# Patient Record
Sex: FEMALE | Race: WHITE | Employment: UNEMPLOYED | URBAN - NONMETROPOLITAN AREA
[De-identification: names, ages, dates, MRNs, and addresses within clinical notes are randomized per-mention and may not be internally consistent; named-entity substitution may affect disease eponyms.]

---

## 2019-10-30 ENCOUNTER — OFFICE VISIT (OUTPATIENT)
Dept: PRIMARY CARE CLINIC | Age: 64
End: 2019-10-30
Payer: COMMERCIAL

## 2019-10-30 VITALS
HEART RATE: 101 BPM | OXYGEN SATURATION: 97 % | HEIGHT: 64 IN | RESPIRATION RATE: 16 BRPM | DIASTOLIC BLOOD PRESSURE: 70 MMHG | BODY MASS INDEX: 28.34 KG/M2 | WEIGHT: 166 LBS | SYSTOLIC BLOOD PRESSURE: 128 MMHG

## 2019-10-30 DIAGNOSIS — Z12.39 ENCOUNTER FOR SCREENING FOR MALIGNANT NEOPLASM OF BREAST: ICD-10-CM

## 2019-10-30 DIAGNOSIS — R21 RASH: Primary | ICD-10-CM

## 2019-10-30 DIAGNOSIS — I10 ESSENTIAL HYPERTENSION: ICD-10-CM

## 2019-10-30 DIAGNOSIS — Z12.12 ENCOUNTER FOR SCREENING FOR MALIGNANT NEOPLASM OF RECTUM: ICD-10-CM

## 2019-10-30 PROBLEM — F32.A DEPRESSION: Status: ACTIVE | Noted: 2019-10-30

## 2019-10-30 PROBLEM — M79.7 FIBROMYALGIA: Status: ACTIVE | Noted: 2019-10-30

## 2019-10-30 PROCEDURE — 99214 OFFICE O/P EST MOD 30 MIN: CPT | Performed by: FAMILY MEDICINE

## 2019-10-30 PROCEDURE — G8419 CALC BMI OUT NRM PARAM NOF/U: HCPCS | Performed by: FAMILY MEDICINE

## 2019-10-30 PROCEDURE — G8427 DOCREV CUR MEDS BY ELIG CLIN: HCPCS | Performed by: FAMILY MEDICINE

## 2019-10-30 PROCEDURE — 3017F COLORECTAL CA SCREEN DOC REV: CPT | Performed by: FAMILY MEDICINE

## 2019-10-30 PROCEDURE — 96372 THER/PROPH/DIAG INJ SC/IM: CPT | Performed by: FAMILY MEDICINE

## 2019-10-30 PROCEDURE — 1036F TOBACCO NON-USER: CPT | Performed by: FAMILY MEDICINE

## 2019-10-30 PROCEDURE — G8484 FLU IMMUNIZE NO ADMIN: HCPCS | Performed by: FAMILY MEDICINE

## 2019-10-30 RX ORDER — TRIAMCINOLONE ACETONIDE 40 MG/ML
40 INJECTION, SUSPENSION INTRA-ARTICULAR; INTRAMUSCULAR ONCE
Status: COMPLETED | OUTPATIENT
Start: 2019-10-30 | End: 2019-10-30

## 2019-10-30 RX ORDER — OXCARBAZEPINE 600 MG/1
TABLET, FILM COATED ORAL
Refills: 1 | COMMUNITY
Start: 2019-08-15 | End: 2022-01-14 | Stop reason: SDUPTHER

## 2019-10-30 RX ORDER — AMITRIPTYLINE HYDROCHLORIDE 100 MG/1
TABLET, FILM COATED ORAL
Refills: 1 | COMMUNITY
Start: 2019-10-12 | End: 2022-01-14 | Stop reason: CLARIF

## 2019-10-30 RX ADMIN — TRIAMCINOLONE ACETONIDE 40 MG: 40 INJECTION, SUSPENSION INTRA-ARTICULAR; INTRAMUSCULAR at 13:33

## 2019-10-31 ENCOUNTER — HOSPITAL ENCOUNTER (OUTPATIENT)
Age: 64
Discharge: HOME | End: 2019-11-02
Payer: COMMERCIAL

## 2019-10-31 ENCOUNTER — TELEPHONE (OUTPATIENT)
Dept: PRIMARY CARE CLINIC | Age: 64
End: 2019-10-31

## 2019-10-31 DIAGNOSIS — I10 ESSENTIAL HYPERTENSION: ICD-10-CM

## 2019-10-31 LAB
ALBUMIN SERPL-MCNC: 4.5 G/DL (ref 3.5–5.2)
ALP BLD-CCNC: 121 U/L (ref 35–104)
ALT SERPL-CCNC: 12 U/L (ref 0–32)
ANION GAP SERPL CALCULATED.3IONS-SCNC: 12 MMOL/L (ref 7–16)
AST SERPL-CCNC: 16 U/L (ref 0–31)
BILIRUB SERPL-MCNC: 0.2 MG/DL (ref 0–1.2)
BUN BLDV-MCNC: 9 MG/DL (ref 8–23)
CALCIUM SERPL-MCNC: 9.4 MG/DL (ref 8.6–10.2)
CHLORIDE BLD-SCNC: 93 MMOL/L (ref 98–107)
CHOLESTEROL, TOTAL: 207 MG/DL (ref 0–199)
CO2: 24 MMOL/L (ref 22–29)
CREAT SERPL-MCNC: 0.6 MG/DL (ref 0.5–1)
GFR AFRICAN AMERICAN: >60
GFR NON-AFRICAN AMERICAN: >60 ML/MIN/1.73
GLUCOSE BLD-MCNC: 88 MG/DL (ref 74–99)
HDLC SERPL-MCNC: 60 MG/DL
LDL CHOLESTEROL CALCULATED: 132 MG/DL (ref 0–99)
POTASSIUM SERPL-SCNC: 4.4 MMOL/L (ref 3.5–5)
SODIUM BLD-SCNC: 129 MMOL/L (ref 132–146)
TOTAL PROTEIN: 7.8 G/DL (ref 6.4–8.3)
TRIGL SERPL-MCNC: 74 MG/DL (ref 0–149)
VLDLC SERPL CALC-MCNC: 15 MG/DL

## 2019-10-31 PROCEDURE — 80053 COMPREHEN METABOLIC PANEL: CPT

## 2019-10-31 PROCEDURE — 80061 LIPID PANEL: CPT

## 2019-10-31 PROCEDURE — 36415 COLL VENOUS BLD VENIPUNCTURE: CPT

## 2019-10-31 RX ORDER — TRIAMCINOLONE ACETONIDE 1 MG/G
CREAM TOPICAL
Qty: 1 TUBE | Refills: 2 | Status: SHIPPED
Start: 2019-10-31 | End: 2020-05-29

## 2019-11-05 DIAGNOSIS — Z12.12 ENCOUNTER FOR SCREENING FOR MALIGNANT NEOPLASM OF RECTUM: ICD-10-CM

## 2019-11-05 LAB
CONTROL: NORMAL
HEMOCCULT STL QL: NORMAL

## 2019-11-05 PROCEDURE — 82274 ASSAY TEST FOR BLOOD FECAL: CPT | Performed by: FAMILY MEDICINE

## 2020-02-19 ENCOUNTER — OFFICE VISIT (OUTPATIENT)
Dept: PRIMARY CARE CLINIC | Age: 65
End: 2020-02-19
Payer: COMMERCIAL

## 2020-02-19 VITALS
TEMPERATURE: 97.1 F | HEART RATE: 150 BPM | HEIGHT: 63 IN | RESPIRATION RATE: 18 BRPM | DIASTOLIC BLOOD PRESSURE: 82 MMHG | WEIGHT: 162 LBS | BODY MASS INDEX: 28.7 KG/M2 | SYSTOLIC BLOOD PRESSURE: 150 MMHG | OXYGEN SATURATION: 95 %

## 2020-02-19 PROBLEM — I10 HYPERTENSIVE DISORDER: Status: ACTIVE | Noted: 2020-02-19

## 2020-02-19 PROCEDURE — G8484 FLU IMMUNIZE NO ADMIN: HCPCS | Performed by: FAMILY MEDICINE

## 2020-02-19 PROCEDURE — G8427 DOCREV CUR MEDS BY ELIG CLIN: HCPCS | Performed by: FAMILY MEDICINE

## 2020-02-19 PROCEDURE — 3017F COLORECTAL CA SCREEN DOC REV: CPT | Performed by: FAMILY MEDICINE

## 2020-02-19 PROCEDURE — G8419 CALC BMI OUT NRM PARAM NOF/U: HCPCS | Performed by: FAMILY MEDICINE

## 2020-02-19 PROCEDURE — 1036F TOBACCO NON-USER: CPT | Performed by: FAMILY MEDICINE

## 2020-02-19 PROCEDURE — 99214 OFFICE O/P EST MOD 30 MIN: CPT | Performed by: FAMILY MEDICINE

## 2020-02-19 RX ORDER — KETOCONAZOLE 20 MG/G
CREAM TOPICAL
Qty: 30 G | Refills: 1 | Status: SHIPPED
Start: 2020-02-19 | End: 2021-01-14 | Stop reason: ALTCHOICE

## 2020-02-19 RX ORDER — BACLOFEN 10 MG/1
10 TABLET ORAL EVERY EVENING
COMMUNITY
Start: 2020-02-02 | End: 2022-06-24 | Stop reason: SDUPTHER
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
## 2020-04-14 ENCOUNTER — TELEPHONE (OUTPATIENT)
Dept: PRIMARY CARE CLINIC | Age: 65
End: 2020-04-14

## 2020-04-21 ENCOUNTER — TELEPHONE (OUTPATIENT)
Dept: PRIMARY CARE CLINIC | Age: 65
End: 2020-04-21

## 2020-05-29 ENCOUNTER — OFFICE VISIT (OUTPATIENT)
Dept: PRIMARY CARE CLINIC | Age: 65
End: 2020-05-29
Payer: COMMERCIAL

## 2020-05-29 VITALS
TEMPERATURE: 98 F | SYSTOLIC BLOOD PRESSURE: 136 MMHG | HEART RATE: 88 BPM | WEIGHT: 162 LBS | OXYGEN SATURATION: 97 % | RESPIRATION RATE: 16 BRPM | HEIGHT: 63 IN | BODY MASS INDEX: 28.7 KG/M2 | DIASTOLIC BLOOD PRESSURE: 80 MMHG

## 2020-05-29 PROCEDURE — 99214 OFFICE O/P EST MOD 30 MIN: CPT | Performed by: FAMILY MEDICINE

## 2020-05-29 PROCEDURE — G8419 CALC BMI OUT NRM PARAM NOF/U: HCPCS | Performed by: FAMILY MEDICINE

## 2020-05-29 PROCEDURE — 3017F COLORECTAL CA SCREEN DOC REV: CPT | Performed by: FAMILY MEDICINE

## 2020-05-29 PROCEDURE — 1036F TOBACCO NON-USER: CPT | Performed by: FAMILY MEDICINE

## 2020-05-29 PROCEDURE — G8427 DOCREV CUR MEDS BY ELIG CLIN: HCPCS | Performed by: FAMILY MEDICINE

## 2020-05-29 RX ORDER — TRIAMCINOLONE ACETONIDE 5 MG/G
CREAM TOPICAL
Qty: 15 G | Refills: 1 | Status: SHIPPED | OUTPATIENT
Start: 2020-05-29 | End: 2020-06-05

## 2020-05-29 RX ORDER — MECLIZINE HYDROCHLORIDE 25 MG/1
25 TABLET ORAL 3 TIMES DAILY PRN
Qty: 15 TABLET | Refills: 0 | Status: SHIPPED | OUTPATIENT
Start: 2020-05-29 | End: 2020-06-08

## 2020-05-29 NOTE — PROGRESS NOTES
2020     [facility]    : 1955 Sex: female   Age: 60 y.o. Chief Complaint   Patient presents with    Hypertension    Tinea       HPI: This 60y.o. -year-old female  presents today with a chief complaint of a rash. Current medication list reviewed. The patient is tolerating all medications well without adverse events or known side effects. The patient does understand the risk and benefits of the prescribed medications. The patient is not up-to-date on all age-appropriate wellness issues. Patient presents today with a chronic intermittent pruritic rash. The patient has an active lesion near the right wrist.  The patient also presents today with a complaint of vertigo. ROS:   Const: Denies changes in appetite, chills, fever, night sweats and weight loss. Eyes:  Denies discharge, a recent change in visual acuity, blurred vision and double vision. ENMT: Denies discharge of the ears, hearing loss, pain of the ears. Denies nasal or sinus symptoms other than stated above. Denies mouth or throat symptoms. CV:  Denies chest pain, dyspnea on exertion, orthopnea, palpitations and PND  Resp: Denies chest pain, cough, SOB and wheezing. GI: Denies abdominal pain, constipation, diarrhea, heartburn, indigestion, nausea and vomiting. : Denies dysuria, frequency, hematuria, nocturia and urgency. Musculo: Denies arthralgias and myalgia  Skin:  Denies lesions, pruritus and rash. Neuro: Denies dizziness, lightheadedness, numbness, tingling and weakness. Psych:  Denies anxiety and depression  Endocrine: Denies anxiety and depression. Hema/Lymph: Denies hematologic symptoms  Allergy/Immuno:  Denies allergic/immunologic symptoms. Pertinent positives reviewed and noted      Current Outpatient Medications:     meclizine (ANTIVERT) 25 MG tablet, Take 1 tablet by mouth 3 times daily as needed for Dizziness, Disp: 15 tablet, Rfl: 0    triamcinolone (ARISTOCORT) 0.5 % cream, Apply twice daily. , Disp:

## 2020-06-26 RX ORDER — MECLIZINE HYDROCHLORIDE 25 MG/1
25 TABLET ORAL 3 TIMES DAILY PRN
Qty: 90 TABLET | Refills: 1 | Status: SHIPPED
Start: 2020-06-26 | End: 2021-04-29 | Stop reason: SDUPTHER

## 2020-06-26 RX ORDER — MECLIZINE HYDROCHLORIDE 25 MG/1
25 TABLET ORAL 3 TIMES DAILY PRN
COMMUNITY
End: 2020-06-26 | Stop reason: SDUPTHER

## 2020-06-26 NOTE — TELEPHONE ENCOUNTER
Last Appointment:  5/29/2020  Future Appointments   Date Time Provider Layton King   9/3/2020 11:30 AM Pavan Burkett  [facility]      Patient needs pended med refilled.     Electronically signed by Radha Vargas LPN on 8/11/4305 at 7:68 AM

## 2020-11-03 PROBLEM — F32.A DEPRESSION: Status: RESOLVED | Noted: 2019-10-30 | Resolved: 2020-11-03

## 2021-01-14 ENCOUNTER — OFFICE VISIT (OUTPATIENT)
Dept: FAMILY MEDICINE CLINIC | Age: 66
End: 2021-01-14
Payer: COMMERCIAL

## 2021-01-14 VITALS
WEIGHT: 159 LBS | OXYGEN SATURATION: 97 % | SYSTOLIC BLOOD PRESSURE: 215 MMHG | BODY MASS INDEX: 28.17 KG/M2 | DIASTOLIC BLOOD PRESSURE: 110 MMHG | HEART RATE: 103 BPM | TEMPERATURE: 97.9 F

## 2021-01-14 DIAGNOSIS — L30.9 DERMATITIS: ICD-10-CM

## 2021-01-14 DIAGNOSIS — I16.0 HYPERTENSIVE URGENCY: Primary | ICD-10-CM

## 2021-01-14 PROCEDURE — G8484 FLU IMMUNIZE NO ADMIN: HCPCS | Performed by: PHYSICIAN ASSISTANT

## 2021-01-14 PROCEDURE — 99214 OFFICE O/P EST MOD 30 MIN: CPT | Performed by: PHYSICIAN ASSISTANT

## 2021-01-14 PROCEDURE — G8427 DOCREV CUR MEDS BY ELIG CLIN: HCPCS | Performed by: PHYSICIAN ASSISTANT

## 2021-01-14 PROCEDURE — 3017F COLORECTAL CA SCREEN DOC REV: CPT | Performed by: PHYSICIAN ASSISTANT

## 2021-01-14 PROCEDURE — 1036F TOBACCO NON-USER: CPT | Performed by: PHYSICIAN ASSISTANT

## 2021-01-14 PROCEDURE — G8419 CALC BMI OUT NRM PARAM NOF/U: HCPCS | Performed by: PHYSICIAN ASSISTANT

## 2021-01-14 PROCEDURE — 93000 ELECTROCARDIOGRAM COMPLETE: CPT | Performed by: PHYSICIAN ASSISTANT

## 2021-01-14 RX ORDER — TRIAMCINOLONE ACETONIDE 5 MG/G
CREAM TOPICAL 3 TIMES DAILY
COMMUNITY
End: 2021-04-29 | Stop reason: SDUPTHER

## 2021-01-14 NOTE — PROGRESS NOTES
present. Vitals reviewed per triage. Head/Face: Normocephalic, atraumatic. Facies is symmetric. Eyes: Pupils are equal, round, and reactive to light. No gross abnormalities of fundoscopic exam.  ENMT: Tympanic membranes are pearly gray with good light reflex bilaterally. Nares are patent. Buccal mucosa was moist.   Posterior pharynx shows no exudate, irritation or redness. Neck: Supple and symmetric. Palpation reveals no adenopathy. Trachea midline. Resp: Lungs are clear bilaterally. CV: Rhythm is regular. S1 is normal. S2 is normal. No murmurs rubs or clicks. Musculo: Patient moves extremities without pain or limitation. Muscle strength is strong and equal bilaterally. Pulses are equal bilaterally. No edema. Skin: Skin is warm and dry. Patient does have a blotchy erythematous macular rash noted to her anterior chest and neck area. No vesicles pustules or signs and symptoms of secondary infection  Neuro: Alert and oriented x3. Speech is articulate and fluent. No focal neurologic deficits. Psych: Patient's mood and affect are appropriate. I did repatient repeat the patient's blood pressure was 210/106. EKG shows a sinus tach of 109    no acute ischemia is noted. I do feel based on the patient's history and physical exam further evaluation is warranted of the patient's blood pressure. Therefore patient was sent to [facility] emergency department for further evaluation and treatment. Nakul Griffiths was seen today for rash. Diagnoses and all orders for this visit:    Hypertensive urgency  -     EKG 12 Lead    Dermatitis        Return for To ER for evaluation and management.     Seen By:    LORENZO Kan

## 2021-01-26 ENCOUNTER — OFFICE VISIT (OUTPATIENT)
Dept: PRIMARY CARE CLINIC | Age: 66
End: 2021-01-26
Payer: COMMERCIAL

## 2021-01-26 VITALS
RESPIRATION RATE: 16 BRPM | HEART RATE: 93 BPM | HEIGHT: 63 IN | WEIGHT: 160 LBS | SYSTOLIC BLOOD PRESSURE: 138 MMHG | BODY MASS INDEX: 28.35 KG/M2 | OXYGEN SATURATION: 97 % | DIASTOLIC BLOOD PRESSURE: 84 MMHG

## 2021-01-26 DIAGNOSIS — K21.9 GASTROESOPHAGEAL REFLUX DISEASE, UNSPECIFIED WHETHER ESOPHAGITIS PRESENT: ICD-10-CM

## 2021-01-26 DIAGNOSIS — I10 ESSENTIAL HYPERTENSION: Primary | ICD-10-CM

## 2021-01-26 DIAGNOSIS — R21 RASH: ICD-10-CM

## 2021-01-26 PROCEDURE — 3017F COLORECTAL CA SCREEN DOC REV: CPT | Performed by: FAMILY MEDICINE

## 2021-01-26 PROCEDURE — 99214 OFFICE O/P EST MOD 30 MIN: CPT | Performed by: FAMILY MEDICINE

## 2021-01-26 PROCEDURE — 1036F TOBACCO NON-USER: CPT | Performed by: FAMILY MEDICINE

## 2021-01-26 PROCEDURE — G8419 CALC BMI OUT NRM PARAM NOF/U: HCPCS | Performed by: FAMILY MEDICINE

## 2021-01-26 PROCEDURE — G8427 DOCREV CUR MEDS BY ELIG CLIN: HCPCS | Performed by: FAMILY MEDICINE

## 2021-01-26 PROCEDURE — G8484 FLU IMMUNIZE NO ADMIN: HCPCS | Performed by: FAMILY MEDICINE

## 2021-01-26 RX ORDER — OMEPRAZOLE 20 MG/1
20 CAPSULE, DELAYED RELEASE ORAL
Qty: 30 CAPSULE | Refills: 5 | Status: SHIPPED
Start: 2021-01-26 | End: 2021-04-29 | Stop reason: SDUPTHER

## 2021-01-26 RX ORDER — AMLODIPINE BESYLATE 5 MG/1
TABLET ORAL
COMMUNITY
Start: 2021-01-15 | End: 2021-02-18 | Stop reason: SDUPTHER

## 2021-01-26 SDOH — ECONOMIC STABILITY: FOOD INSECURITY: WITHIN THE PAST 12 MONTHS, THE FOOD YOU BOUGHT JUST DIDN'T LAST AND YOU DIDN'T HAVE MONEY TO GET MORE.: NEVER TRUE

## 2021-01-26 SDOH — ECONOMIC STABILITY: TRANSPORTATION INSECURITY
IN THE PAST 12 MONTHS, HAS THE LACK OF TRANSPORTATION KEPT YOU FROM MEDICAL APPOINTMENTS OR FROM GETTING MEDICATIONS?: YES

## 2021-01-26 SDOH — ECONOMIC STABILITY: TRANSPORTATION INSECURITY
IN THE PAST 12 MONTHS, HAS LACK OF TRANSPORTATION KEPT YOU FROM MEETINGS, WORK, OR FROM GETTING THINGS NEEDED FOR DAILY LIVING?: YES

## 2021-01-26 SDOH — ECONOMIC STABILITY: FOOD INSECURITY: WITHIN THE PAST 12 MONTHS, YOU WORRIED THAT YOUR FOOD WOULD RUN OUT BEFORE YOU GOT MONEY TO BUY MORE.: NEVER TRUE
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
## 2021-02-18 RX ORDER — AMLODIPINE BESYLATE 5 MG/1
TABLET ORAL
Qty: 30 TABLET | Refills: 3 | Status: SHIPPED
Start: 2021-02-18 | End: 2021-04-29 | Stop reason: SDUPTHER

## 2021-02-18 NOTE — TELEPHONE ENCOUNTER
Pt called stated she needed a refill on Norvasc. Stated that it was prescribed by Emergency room but seems to be working.

## 2021-04-29 ENCOUNTER — OFFICE VISIT (OUTPATIENT)
Dept: PRIMARY CARE CLINIC | Age: 66
End: 2021-04-29
Payer: COMMERCIAL

## 2021-04-29 VITALS
SYSTOLIC BLOOD PRESSURE: 132 MMHG | DIASTOLIC BLOOD PRESSURE: 80 MMHG | WEIGHT: 169 LBS | BODY MASS INDEX: 28.85 KG/M2 | HEART RATE: 85 BPM | OXYGEN SATURATION: 95 % | HEIGHT: 64 IN | RESPIRATION RATE: 16 BRPM

## 2021-04-29 DIAGNOSIS — R42 VERTIGO: ICD-10-CM

## 2021-04-29 DIAGNOSIS — R21 RASH: ICD-10-CM

## 2021-04-29 DIAGNOSIS — I10 ESSENTIAL HYPERTENSION: Primary | ICD-10-CM

## 2021-04-29 DIAGNOSIS — Z12.12 ENCOUNTER FOR SCREENING FOR MALIGNANT NEOPLASM OF RECTUM: ICD-10-CM

## 2021-04-29 DIAGNOSIS — K21.9 GASTROESOPHAGEAL REFLUX DISEASE, UNSPECIFIED WHETHER ESOPHAGITIS PRESENT: ICD-10-CM

## 2021-04-29 PROCEDURE — G8419 CALC BMI OUT NRM PARAM NOF/U: HCPCS | Performed by: FAMILY MEDICINE

## 2021-04-29 PROCEDURE — 1036F TOBACCO NON-USER: CPT | Performed by: FAMILY MEDICINE

## 2021-04-29 PROCEDURE — 3017F COLORECTAL CA SCREEN DOC REV: CPT | Performed by: FAMILY MEDICINE

## 2021-04-29 PROCEDURE — 99214 OFFICE O/P EST MOD 30 MIN: CPT | Performed by: FAMILY MEDICINE

## 2021-04-29 PROCEDURE — G8427 DOCREV CUR MEDS BY ELIG CLIN: HCPCS | Performed by: FAMILY MEDICINE

## 2021-04-29 RX ORDER — MECLIZINE HYDROCHLORIDE 25 MG/1
25 TABLET ORAL 3 TIMES DAILY PRN
Qty: 90 TABLET | Refills: 1 | Status: SHIPPED | OUTPATIENT
Start: 2021-04-29 | End: 2022-01-14 | Stop reason: ALTCHOICE

## 2021-04-29 RX ORDER — TRIAMCINOLONE ACETONIDE 5 MG/G
CREAM TOPICAL 3 TIMES DAILY
Qty: 1 TUBE | Refills: 3 | Status: SHIPPED | OUTPATIENT
Start: 2021-04-29 | End: 2022-01-14 | Stop reason: ALTCHOICE

## 2021-04-29 RX ORDER — OMEPRAZOLE 20 MG/1
20 CAPSULE, DELAYED RELEASE ORAL
Qty: 30 CAPSULE | Refills: 5 | Status: SHIPPED | OUTPATIENT
Start: 2021-04-29 | End: 2021-07-30 | Stop reason: SDUPTHER

## 2021-04-29 RX ORDER — AMLODIPINE BESYLATE 5 MG/1
TABLET ORAL
Qty: 30 TABLET | Refills: 3 | Status: SHIPPED | OUTPATIENT
Start: 2021-04-29 | End: 2022-01-14 | Stop reason: ALTCHOICE

## 2021-04-29 NOTE — PROGRESS NOTES
2021     Nakia Vargas    : 1955 Sex: female   Age: 63 y.o. Chief Complaint   Patient presents with    Hypertension    Other       HPI: This 63y.o. -year-old female  presents today for evaluation and management of her  chronic medical problems. Current medication list reviewed. The patient is tolerating all medications well without adverse events or known side effects. The patient does understand the risk and benefits of the prescribed medications. The patient is not up-to-date on all age-appropriate wellness issues. The patient presents today with an annular rough appearing rash of the thigh region. The patient has been using Kenalog 0.5% cream without resolution. ROS:   Const: Denies changes in appetite, chills, fever, night sweats and weight loss. Eyes:  Denies discharge, a recent change in visual acuity, blurred vision and double vision. ENMT: Denies discharge of the ears, hearing loss, pain of the ears. Denies nasal or sinus symptoms other than stated above. Denies mouth or throat symptoms. CV:  Denies chest pain, dyspnea on exertion, orthopnea, palpitations and PND  Resp: Denies chest pain, cough, SOB and wheezing. GI: Denies abdominal pain, constipation, diarrhea, heartburn, indigestion, nausea and vomiting. : Denies dysuria, frequency, hematuria, nocturia and urgency. Musculo: Positive for rash. Skin:  Denies lesions, pruritus and rash. Neuro: Denies dizziness, lightheadedness, numbness, tingling and weakness. Psych:  Denies anxiety and depression  Endocrine: Denies polyuria, polydipsia, polyphagia, weight gain, dry skin, constipation, fatigue, cold intolerance, heat intolerance or tremors. Hema/Lymph: Denies hematologic symptoms  Allergy/Immuno:  Denies allergic/immunologic symptoms.   Pertinent positives reviewed and noted      Current Outpatient Medications:     meclizine (ANTIVERT) 25 MG tablet, Take 1 tablet by mouth 3 times daily as needed for Dizziness, Disp: Intimate partner violence     Fear of current or ex partner: Not on file     Emotionally abused: Not on file     Physically abused: Not on file     Forced sexual activity: Not on file   Other Topics Concern    Not on file   Social History Narrative    Not on file     Past Surgical History:   Procedure Laterality Date    BRAIN SURGERY      decompression trigeminal nerve    CARPAL [facility]        History reviewed. No pertinent family history. Vitals:    04/29/21 1255   BP: 132/80   Pulse: 85   Resp: 16   SpO2: 95%   Weight: 169 lb (76.7 kg)   Height: 5' 4\" (1.626 m)        Exam: Const: Appears healthy and well developed. No signs of acute distress present. Eyes: PERRL  ENMT: Tympanic membranes are intact. Nasal mucosa intact without noted erythema Septum is in the midline. Posterior pharynx shows no exudate, irritation or redness. Neck:  Supple without adenopathy. Adequate range of motion   Resp: No rales, rhonchi, wheezes appreciated over the lungs bilaterally. CV: S1, S2 within normal limits. Regular rate and rhythm noted. Without murmur, gallop or rub. Extremities:  Pulses intact. Without noted edema. Abdomen: Positive bowel sounds. Palpation reveals softness, with no distension, organomegaly or tenderness. No abdominal masses palpable. Skin: Skin is warm and dry. An erythematous annular rash is noted on bilateral thighs. Musculo: Unchanged upon examination. Neuro: Alert and oriented X3. Cranial nerves grossly intact. Psych: Mood is normal.  Affect is normal.   Vital signs reviewed. Controlled Substances Monitoring:     No flowsheet data found. Plan Per Assessment:  Lamberto Paris was seen today for hypertension and other. Diagnoses and all orders for this visit:    Essential hypertension    Vertigo  -     meclizine (ANTIVERT) 25 MG tablet;  Take 1 tablet by mouth 3 times daily as needed for Dizziness    Gastroesophageal reflux disease, unspecified whether esophagitis present    Encounter for screening for malignant neoplasm of rectum  -     POCT Fecal Immunochemical Test (FIT); Future    Rash    Other orders  -     amLODIPine (NORVASC) 5 MG tablet; TAKE 1 TABLET BY MOUTH EVERY DAY  -     omeprazole (PRILOSEC) 20 MG delayed release capsule; Take 1 capsule by mouth every morning (before breakfast)  -     triamcinolone (ARISTOCORT) 0.5 % cream; Apply topically 3 times daily Apply topically 3 times daily. Return in about 2 weeks (around 5/13/2021) for Biopsy. Annie Mcarthur MD    Note was generated with the assistance of voice recognition software. Document was reviewed however may contain grammatical errors.

## 2021-05-13 ENCOUNTER — OFFICE VISIT (OUTPATIENT)
Dept: PRIMARY CARE CLINIC | Age: 66
End: 2021-05-13
Payer: COMMERCIAL

## 2021-05-13 VITALS
RESPIRATION RATE: 16 BRPM | WEIGHT: 169 LBS | SYSTOLIC BLOOD PRESSURE: 126 MMHG | DIASTOLIC BLOOD PRESSURE: 70 MMHG | BODY MASS INDEX: 28.85 KG/M2 | HEIGHT: 64 IN

## 2021-05-13 DIAGNOSIS — R21 RASH: Primary | ICD-10-CM

## 2021-05-13 PROCEDURE — 3017F COLORECTAL CA SCREEN DOC REV: CPT | Performed by: FAMILY MEDICINE

## 2021-05-13 PROCEDURE — G8427 DOCREV CUR MEDS BY ELIG CLIN: HCPCS | Performed by: FAMILY MEDICINE

## 2021-05-13 PROCEDURE — G8419 CALC BMI OUT NRM PARAM NOF/U: HCPCS | Performed by: FAMILY MEDICINE

## 2021-05-13 PROCEDURE — 1036F TOBACCO NON-USER: CPT | Performed by: FAMILY MEDICINE

## 2021-05-13 PROCEDURE — 99212 OFFICE O/P EST SF 10 MIN: CPT | Performed by: FAMILY MEDICINE

## 2021-05-13 PROCEDURE — 11104 PUNCH BX SKIN SINGLE LESION: CPT | Performed by: FAMILY MEDICINE

## 2021-05-13 NOTE — PROGRESS NOTES
2021     Melani Sanchez    : 1955 Sex: female   Age: 63 y.o. Chief Complaint   Patient presents with    Hypertension    Skin Problem       HPI: This 63y.o. -year-old female  presents today for evaluation and management of her  chronic medical problems. Presents today for a biopsy of a longstanding rash. Current medication list reviewed. The patient is tolerating all medications well without adverse events or known side effects. The patient does understand the risk and benefits of the prescribed medications. The patient is up-to-date on all age-appropriate wellness issues. ROS:   Const: Denies changes in appetite, chills, fever, night sweats and weight loss. Eyes:  Denies discharge, a recent change in visual acuity, blurred vision and double vision. ENMT: Denies discharge of the ears, hearing loss, pain of the ears. Denies nasal or sinus symptoms other than stated above. Denies mouth or throat symptoms. CV:  Denies chest pain, dyspnea on exertion, orthopnea, palpitations and PND  Resp: Denies chest pain, cough, SOB and wheezing. GI: Denies abdominal pain, constipation, diarrhea, heartburn, indigestion, nausea and vomiting. : Denies dysuria, frequency, hematuria, nocturia and urgency. Musculo: Denies arthralgias and myalgia  Skin: Positive for rash. Neuro: Denies dizziness, lightheadedness, numbness, tingling and weakness. Psych:  Denies anxiety and depression  Endocrine: Denies polyuria, polydipsia, polyphagia, weight gain, dry skin, constipation, fatigue, cold intolerance, heat intolerance or tremors. Hema/Lymph: Denies hematologic symptoms  Allergy/Immuno:  Denies allergic/immunologic symptoms.   Pertinent positives reviewed and noted      Current Outpatient Medications:     meclizine (ANTIVERT) 25 MG tablet, Take 1 tablet by mouth 3 times daily as needed for Dizziness, Disp: 90 tablet, Rfl: 1    amLODIPine (NORVASC) 5 MG tablet, TAKE 1 TABLET BY MOUTH EVERY DAY, Disp: 30 tablet, Rfl: 3    omeprazole (PRILOSEC) 20 MG delayed release capsule, Take 1 capsule by mouth every morning (before breakfast), Disp: 30 capsule, Rfl: 5    triamcinolone (ARISTOCORT) 0.5 % cream, Apply topically 3 times daily Apply topically 3 times daily. , Disp: 1 Tube, Rfl: 3    baclofen (LIORESAL) 10 MG tablet, TK 2 TS PO QPM, Disp: , Rfl:     amitriptyline (ELAVIL) 100 MG tablet, TK 1 T PO  QHS, Disp: , Rfl: 1    OXcarbazepine (TRILEPTAL) 600 MG tablet, TK 1 T PO TID, Disp: , Rfl: 1    gabapentin (NEURONTIN) 600 MG tablet, Take 600 mg by mouth 4 times daily. , Disp: , Rfl:     Allergies   Allergen Reactions    Tylenol [Acetaminophen] Nausea And Vomiting       Past Medical History:   Diagnosis Date    Acid reflux     Depression     Self abuse    Trigeminal neuralgia 2005    diagnosed     Social History     Socioeconomic History    Marital status:      Spouse name: Not on file    Number of children: 1    Years of education: 15    Highest education level: Not on file   Occupational History    Not on file   Social Needs    Financial resource strain: Not hard at all   [employer] insecurity     Worry: Never true     Inability: Never true   [employer] needs     Medical: Yes     Non-medical: Yes   Tobacco Use    Smoking status: Never Smoker    Smokeless tobacco: Never Used   Substance and Sexual Activity    Alcohol use: No    Drug use: No    Sexual activity: Not on file   Lifestyle    Physical activity     Days per week: Not on file     Minutes per session: Not on file    Stress: Not on file   Relationships    Social connections     Talks on phone: Not on file     Gets together: Not on file     Attends Buddhism service: Not on file     Active member of club or organization: Not on file     Attends meetings of clubs or organizations: Not on file     Relationship status: Not on file    Intimate partner violence     Fear of current or ex partner: Not on file     Emotionally abused: Not on file     Physically abused: Not on file     Forced sexual activity: Not on file   Other Topics Concern    Not on file   Social History Narrative    Not on file     Past Surgical History:   Procedure Laterality Date    BRAIN SURGERY      decompression trigeminal nerve    CARPAL [facility]        History reviewed. No pertinent family history. Vitals:    05/13/21 1228   BP: 126/70   Resp: 16   Weight: 169 lb (76.7 kg)   Height: 5' 4\" (1.626 m)        Exam: Const: Appears healthy and well developed. No signs of acute distress present. Eyes: PERRL  ENMT: Tympanic membranes are intact. Nasal mucosa intact without noted erythema Septum is in the midline. Posterior pharynx shows no exudate, irritation or redness. Neck:  Supple without adenopathy. Adequate range of motion   Resp: No rales, rhonchi, wheezes appreciated over the lungs bilaterally. CV: S1, S2 within normal limits. Regular rate and rhythm noted. Without murmur, gallop or rub. Extremities:  Pulses intact. Without noted edema. Abdomen: Positive bowel sounds. Palpation reveals softness, with no distension, organomegaly or tenderness. No abdominal masses palpable. Skin: Skin is warm and dry. Annular rash noted left thigh. Musculo: Unchanged upon examination. Neuro: Alert and oriented X3. Cranial nerves grossly intact. Psych: Mood is normal.  Affect is normal.   Vital signs reviewed. Controlled Substances Monitoring:     No flowsheet data found. Plan Per Assessment:  Ryan Caban was seen today for hypertension and skin problem. Diagnoses and all orders for this visit:    Rash      Skin prepped in sterile fashion. Area infiltrated with lidocaine. 3 mm punch biopsy used for tissue removal.  Patient tolerated procedure well. Await pathology report. Return in about 3 months (around 8/13/2021) for MEDICATION CHECK, FOLLOW UP [address] [facility], [place]    Note was generated with the assistance of voice recognition software. Document was reviewed however may contain grammatical errors.

## 2021-07-30 RX ORDER — OMEPRAZOLE 20 MG/1
20 CAPSULE, DELAYED RELEASE ORAL
Qty: 30 CAPSULE | Refills: 5 | Status: SHIPPED
Start: 2021-07-30 | End: 2022-01-14 | Stop reason: SDUPTHER

## 2021-07-30 NOTE — TELEPHONE ENCOUNTER
----- Message from [facility] sent at 7/30/2021  9:08 AM EDT -----  Subject: Refill Request    QUESTIONS  Name of Medication? omeprazole (PRILOSEC) 20 MG delayed release capsule  Patient-reported dosage and instructions? 20mg  How many days do you have left? 0  [id number]  Pharmacy phone number (if available)? [phone]  ---------------------------------------------------------------------------  --------------  CALL BACK INFO  What is the best way for the office to contact you? OK to leave message on   voicemail  Preferred Call Back Phone Number?  [phone]

## 2022-01-14 ENCOUNTER — OFFICE VISIT (OUTPATIENT)
Dept: PRIMARY CARE CLINIC | Age: 67
End: 2022-01-14
Payer: COMMERCIAL

## 2022-01-14 VITALS
HEART RATE: 102 BPM | DIASTOLIC BLOOD PRESSURE: 74 MMHG | RESPIRATION RATE: 16 BRPM | OXYGEN SATURATION: 99 % | SYSTOLIC BLOOD PRESSURE: 136 MMHG | BODY MASS INDEX: 25.61 KG/M2 | WEIGHT: 150 LBS | HEIGHT: 64 IN

## 2022-01-14 DIAGNOSIS — Z23 NEED FOR IMMUNIZATION AGAINST INFLUENZA: ICD-10-CM

## 2022-01-14 DIAGNOSIS — F31.9 BIPOLAR 1 DISORDER (HCC): ICD-10-CM

## 2022-01-14 DIAGNOSIS — G50.0 ANESTHESIA DOLOROSA IN DISTRIBUTION OF TRIGEMINAL NERVE: ICD-10-CM

## 2022-01-14 DIAGNOSIS — R56.9 SEIZURE (HCC): ICD-10-CM

## 2022-01-14 DIAGNOSIS — Z51.81 MEDICATION MONITORING ENCOUNTER: ICD-10-CM

## 2022-01-14 DIAGNOSIS — K21.9 GASTROESOPHAGEAL REFLUX DISEASE, UNSPECIFIED WHETHER ESOPHAGITIS PRESENT: ICD-10-CM

## 2022-01-14 DIAGNOSIS — G50.0 TRIGEMINAL NEURALGIA: Primary | ICD-10-CM

## 2022-01-14 DIAGNOSIS — Z91.51 HISTORY OF SUICIDE ATTEMPT: ICD-10-CM

## 2022-01-14 DIAGNOSIS — R13.10 DYSPHAGIA, UNSPECIFIED TYPE: ICD-10-CM

## 2022-01-14 PROBLEM — T65.91XA INGESTION OF SUBSTANCE: Status: ACTIVE | Noted: 2022-01-14

## 2022-01-14 PROBLEM — T65.91XA INGESTION OF SUBSTANCE: Status: RESOLVED | Noted: 2022-01-14 | Resolved: 2022-01-14

## 2022-01-14 PROCEDURE — G0008 ADMIN INFLUENZA VIRUS VAC: HCPCS | Performed by: STUDENT IN AN ORGANIZED HEALTH CARE EDUCATION/TRAINING PROGRAM

## 2022-01-14 PROCEDURE — G8482 FLU IMMUNIZE ORDER/ADMIN: HCPCS | Performed by: STUDENT IN AN ORGANIZED HEALTH CARE EDUCATION/TRAINING PROGRAM

## 2022-01-14 PROCEDURE — 1036F TOBACCO NON-USER: CPT | Performed by: STUDENT IN AN ORGANIZED HEALTH CARE EDUCATION/TRAINING PROGRAM

## 2022-01-14 PROCEDURE — 90674 CCIIV4 VAC NO PRSV 0.5 ML IM: CPT | Performed by: STUDENT IN AN ORGANIZED HEALTH CARE EDUCATION/TRAINING PROGRAM

## 2022-01-14 PROCEDURE — G8419 CALC BMI OUT NRM PARAM NOF/U: HCPCS | Performed by: STUDENT IN AN ORGANIZED HEALTH CARE EDUCATION/TRAINING PROGRAM

## 2022-01-14 PROCEDURE — 3017F COLORECTAL CA SCREEN DOC REV: CPT | Performed by: STUDENT IN AN ORGANIZED HEALTH CARE EDUCATION/TRAINING PROGRAM

## 2022-01-14 PROCEDURE — 99214 OFFICE O/P EST MOD 30 MIN: CPT | Performed by: STUDENT IN AN ORGANIZED HEALTH CARE EDUCATION/TRAINING PROGRAM

## 2022-01-14 PROCEDURE — G8427 DOCREV CUR MEDS BY ELIG CLIN: HCPCS | Performed by: STUDENT IN AN ORGANIZED HEALTH CARE EDUCATION/TRAINING PROGRAM

## 2022-01-14 RX ORDER — OXCARBAZEPINE 600 MG/1
600 TABLET, FILM COATED ORAL 3 TIMES DAILY
Qty: 90 TABLET | Refills: 2 | Status: SHIPPED
Start: 2022-01-14 | End: 2022-06-24 | Stop reason: SDUPTHER

## 2022-01-14 RX ORDER — GABAPENTIN 600 MG/1
600 TABLET ORAL 4 TIMES DAILY
Qty: 90 TABLET | Refills: 3 | Status: CANCELLED | OUTPATIENT
Start: 2022-01-14 | End: 2022-02-14

## 2022-01-14 RX ORDER — AMITRIPTYLINE HYDROCHLORIDE 25 MG/1
25 TABLET, FILM COATED ORAL NIGHTLY
COMMUNITY
Start: 2022-01-03 | End: 2022-06-24 | Stop reason: SINTOL

## 2022-01-14 RX ORDER — OMEPRAZOLE 20 MG/1
20 CAPSULE, DELAYED RELEASE ORAL
Qty: 30 CAPSULE | Refills: 5 | Status: SHIPPED
Start: 2022-01-14 | End: 2022-04-29 | Stop reason: SDUPTHER

## 2022-01-14 RX ORDER — GABAPENTIN 800 MG/1
800 TABLET ORAL 3 TIMES DAILY
Qty: 90 TABLET | Refills: 2 | Status: SHIPPED
Start: 2022-01-14 | End: 2022-08-01 | Stop reason: SDUPTHER

## 2022-01-14 RX ORDER — OXCARBAZEPINE 600 MG/1
TABLET, FILM COATED ORAL
Qty: 60 TABLET | Refills: 3 | Status: CANCELLED | OUTPATIENT
Start: 2022-01-14

## 2022-01-14 RX ORDER — GABAPENTIN 800 MG/1
800 TABLET ORAL 3 TIMES DAILY
COMMUNITY
Start: 2021-12-20 | End: 2022-01-14 | Stop reason: SDUPTHER

## 2022-01-14 ASSESSMENT — ENCOUNTER SYMPTOMS
VOMITING: 0
NAUSEA: 1
TROUBLE SWALLOWING: 1
SHORTNESS OF BREATH: 0

## 2022-01-14 NOTE — LETTER
CONTROLLED SUBSTANCE MEDICATION AGREEMENT     Patient Name: Hao Shanks  Patient YOB: 1955   I understand, that controlled substance medications may be used to help better manage my symptoms and to improve my ability to function at home, work and in social settings. However, I also understand that these medications do have risks, which have been discussed with me, including possible development of physical or psychological dependence. I understand that successful treatment requires mutual trust and honesty between me and my provider. I understand and agree that following this Medication Agreement is necessary in continuing my provider-patient relationship and the success of my treatment plan. Explanation from my Provider: Benefits and Goals of Controlled Substance Medications: There are two potential goals for your treatment: (1) decreased pain and suffering (2) improved daily life functions. There are many possible treatments for your chronic condition(s). Alternatives such as physical therapy, yoga, massage, home daily exercise, meditation, relaxation techniques, injections, chiropractic manipulations, surgery, cognitive therapy, hypnosis and many medications that are not habit-forming may be used. Use of controlled substance medications may be helpful, but they are unlikely to resolve all symptoms or restore all function. Explanation from my Provider: Risks of Controlled Substance Medications:  Opioid pain medications: These medications can lead to problems such as addiction/dependence, sedation, lightheadedness/dizziness, memory issues, falls, constipation, nausea, or vomiting. They may also impair the ability to drive or operate machinery. Additionally, these medications may lower testosterone levels, leading to loss of bone strength, stamina and sex drive.   They may cause problems with breathing, sleep apnea and reduced coughing, which is especially dangerous for patients with lung disease. Overdose or dangerous interactions with alcohol and other medications may occur, leading to death. Hyperalgesia may develop, which means patients receiving opioids for the treatment of pain may become more sensitive to certain painful stimuli, and in some cases, experience pain from ordinarily non-painful stimuli. Women between the ages of 14-53 who could become pregnant should carefully weigh the risks and benefits of opioids with their physicians, as these medications increase the risk of pregnancy complications, including miscarriage,  delivery and stillbirth. It is also possible for babies to be born addicted to opioids. Opioid dependence withdrawal symptoms may include; feelings of uneasiness, increased pain, irritability, belly pain, diarrhea, sweats and goose-flesh. Benzodiazepines and non-benzodiazepine sleep medications: These medications can lead to problems such as addiction/dependence, sedation, fatigue, lightheadedness, dizziness, incoordination, falls, depression, hallucinations, and impaired judgment, memory and concentration. The ability to drive and operate machinery may also be affected. Abnormal sleep-related behaviors have been reported, including sleepwalking, driving, making telephone calls, eating, or having sex while not fully awake. These medications can suppress breathing and worsen sleep apnea, particularly when combined with alcohol or other sedating medications, potentially leading to death. Dependence withdrawal symptoms may include tremors, anxiety, hallucinations and seizures. Stimulants:  Common adverse effects include addiction/dependence, increased blood  pressure and heart rate, decreased appetite, nausea, involuntary weight loss, insomnia,                                                                                                                     Initials:_______   irritability, and headaches.   These risks may increase when these medications are combined with other stimulants, such as caffeine pills or energy drinks, certain weight loss supplements and oral decongestants. Dependence withdrawal symptoms may include depressed mood, loss of interest, suicidal thoughts, anxiety, fatigue, appetite changes and agitation. Testosterone replacement therapy:  Potential side effects include increased risk of stroke and heart attack, blood clots, increased blood pressure, increased cholesterol, enlarged prostate, sleep apnea, irritability/aggression and other mood disorders, and decreased fertility. I agree and understand that I and my prescriber have the following rights and responsibilities regarding my treatment plan:     1. MY RIGHTS:  To be informed of my treatment and medication plan. To be an active participant in my health and wellbeing. 2. MY RESPONSIBILITY AND UNDERSTANDING FOR USE OF MEDICATIONS   I will take medications at the dose and frequency as directed. For my safety, I will not increase or change how I take my medications without the recommendation of my healthcare provider.  I will actively participate in any program recommended by my provider which may improve function, including social, physical, psychological programs.  I will not take my medications with alcohol or other drugs not prescribed to me. I understand that drinking alcohol with my medications increases the chances of side effects, including reduced breathing rate and could lead to personal injury when operating machinery.  I understand that if I have a history of substance use disorders, including alcohol or other illicit drugs, that I may be at increased risk of addiction to my medications.  I agree to notify my provider immediately if I should become pregnant so that my treatment plan can be adjusted.    I agree and understand that I shall only receive controlled substance medications from the prescriber that signed this agreement unless there is written agreement among other prescribers of controlled substances outlining the responsibility of the medications being prescribed.  I understand that the if the controlled medication is not helping to achieve goals, the dosage may be tapered and no longer prescribed. 3. MY RESPONSIBILITY FOR COMMUNICATION / PRESCRIPTION RENEWALS   I agree that all controlled substance medications that I take will be prescribed only by my provider. If another healthcare provider prescribes me medication in an emergency, I will notify my provider within seventy-two (72) hours.  I will arrange for refills at the prescribed interval ONLY during regular office hours. I will not ask for refills earlier than agreed, after-hours, on holidays or weekends. Refills may take up to 72 hours for processing and prescriptions to reach the pharmacy.  I will inform my other health care providers that I am taking these medications and of the existence of this [id number]. In the event of an emergency, I will provide the same information to the emergency department prescribers.  I will keep my provider updated on the pharmacy I am using for controlled medication prescription filling. Initials:_______  4. MY RESPONSIBILITY FOR PROTECTING MEDICATIONS   I will protect my prescriptions and medications. I understand that lost or misplaced prescriptions will not be replaced.  I will keep medications only for my own use and will not share them with others. I will keep all medications away from children.  I agree that if my medications are adjusted or discontinued, I will properly dispose of any remaining medications. I understand that I will be required to dispose of any remaining controlled medications as, directed by my prescriber, prior to being provided with any prescriptions for other controlled medications.   Medication drop box locations can be found at: [email or web address]    5. MY RESPONSIBILITY WITH ILLEGAL DRUGS    I will not use illegal or street drugs or another person's prescription medications not prescribed to me.  If there are identified addiction type symptoms, then referral to a program may be provided by my provider and I agree to follow through with this recommendation. 6. MY RESPONSIBILITY FOR COOPERATION WITH INVESTIGATIONS   I understand that my provider will comply with any applicable law and may discuss my use and/or possible misuse/abuse of controlled substances and alcohol, as appropriate, with any health care provider involved in my care, pharmacist, or legal authority.  I authorize my provider and pharmacy to cooperate fully with law enforcement agencies (as permitted by law) in the investigation of any possible misuse, sale, or other diversion of my controlled substances.  I agree to waive any applicable privilege or right of privacy or confidentiality with respect to these authorizations. 7. PROVIDERS RIGHT TO MONITOR FOR SAFETY: PRESCRIPTION MONITORING / DRUG TESTING   I consent to drug/toxicology screening and will submit to a drug screen upon my providers request to assure I am only taking the prescribed drugs for my safety monitoring. I understand that a drug screen is a laboratory test in which a sample of my urine, blood or saliva is checked to see what drugs I have been taking. This may entail an observed urine specimen, which means that a nurse or other health care provider may watch me provide urine, and I will cooperate if I am asked to provide an observed specimen.  I understand that my provider will check a copy of my State Prescription Monitoring Program () Report in order to safely prescribe medications.  Pill Counts: I consent to pill counts when requested.   I may be asked to bring all my prescribed controlled substance medications, in their original bottles, to all of my scheduled appointments. In addition, my provider may ask me to come to the practice at any time for a random pill count. 8. TERMINATION OF THIS AGREEMENT  For my safety, my prescriber has the right to stop prescribing controlled substance medications and may end this agreement. Initials:_______   Conditions that may result in termination of this agreement:  a. I do not show any improvement in pain, or my activity has not improved. b. I develop rapid tolerance or loss of improvement, as described in my treatment plan.  c. I develop significant side effects from the medication. d. My behavior is not consistent with the responsibilities outlined above, thereby causing safety concerns to continue prescribing controlled substance medications. e. I fail to follow the terms of this agreement. f. Other:____________________________       UNDERSTANDING THIS MEDICATION AGREEMENT:    I have read the above and have had all my questions answered. For chronic disease management, I know that my symptoms can be managed with many types of treatments. A chronic medication trial may be part of my treatment, but I must be an active participant in my care. Medication therapy is only one part of my symptom management plan. In some cases, there may be limited scientific evidence to support the chronic use of certain medications to improve symptoms and daily function. Furthermore, in certain circumstances, there may be scientific information that suggests that the use of chronic controlled substances may worsen my symptoms and increase my risk of unintentional death directly related to this medication therapy. I know that if my provider feels my risk from controlled medications is greater than my benefit, I will have my controlled substance medication(s) compassionately lowered or removed altogether.      I further agree to allow this office to contact my HIPAA contact if there are concerns about my safety and use of the controlled medications. I have agreed to use the prescribed controlled substance medications to me as instructed by my provider and as stated in this Medication Agreement. My initial on each page and my signature below shows that I have read each page and I have had the opportunity to ask questions with answers provided by my provider.     Patient Name (Printed): _____________________________________  Patient Signature:  ______________________   Date: _____________    Prescriber Name (Printed): ___________________________________  Prescriber Signature: _____________________  Date: _____________

## 2022-01-14 NOTE — PROGRESS NOTES
NEW PRIMARY CARE VISIT    24  Name: Von Oneil   : 1955   Age: 61 y.o.   Sex: female        Assessment & Plan:     Problem List Items Addressed This Visit        Digestive    GERD (gastroesophageal reflux disease)     Associated with dysphagia  Continue PPI  Referral to GI         Relevant Medications    Alum & Mag Hydroxide-Simeth (MYLANTA PO)    omeprazole (PRILOSEC) 20 MG delayed release capsule    Other Relevant Orders    External Referral To Gastroenterology       Nervous and Auditory    Trigeminal neuralgia - Primary     Continue oxcarbazepine, gabapentin  OARRS reviewed, CSA signed, UDS ordered  Referral to neuro         Relevant Medications    amitriptyline (ELAVIL) 25 MG tablet    OXcarbazepine (TRILEPTAL) 600 MG tablet    gabapentin (NEURONTIN) 800 MG tablet    Other Relevant Orders    [address] [facility]    COMPREHENSIVE METABOLIC PANEL    CBC WITH AUTO DIFFERENTIAL    TSH    OXCARBAZEPINE LEVEL    PAIN MANAGEMENT PROFILE 1 W/ CONFIRMATION, URINE       Other    Bipolar 1 disorder ([facility] [address]     Follows with psych  Appears to be treated for unipolar depression  Requested records  See safety plan below         Relevant Orders    TSH    Seizure ([facility] [address]     Associated with trigeminal neuralgia per patient  Continue oxcarbazepine, gabapentin  Referral to neuro         Relevant Medications    OXcarbazepine (TRILEPTAL) 600 MG tablet    gabapentin (NEURONTIN) 800 MG tablet    Other Relevant Orders    [address] [facility]    PAIN MANAGEMENT PROFILE 1 W/ CONFIRMATION, URINE    History of suicide attempt     No current suicidal ideations  Took overdose of her medications, wanted to sleep and not wake up  Developed safety plan, advised to have  lock up medications, not just hide them from her         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    Relevant Medications    OXcarbazepine (TRILEPTAL) 600 MG tablet    gabapentin (NEURONTIN) 800 MG tablet    Other Relevant Orders [facility]    PAIN MANAGEMENT PROFILE 1 W/ CONFIRMATION, URINE      Other Visit Diagnoses     Dysphagia, unspecified type        Relevant Orders    External Referral To Gastroenterology    Medication monitoring encounter        Labs ordered for oxcarbazepine    Relevant Orders    COMPREHENSIVE METABOLIC PANEL    CBC WITH AUTO DIFFERENTIAL    TSH    OXCARBAZEPINE LEVEL    PAIN MANAGEMENT PROFILE 1 W/ CONFIRMATION, URINE    Need for immunization against influenza        Relevant Orders    INFLUENZA, MDCK QUADV, 2 YRS AND OLDER, IM, PF, PREFILL SYR OR SDV, 0.5ML (FLUCELVAX QUADV, PF) (Completed)          PDMP Monitoring:  Last PDMP Andrae as Reviewed [facility]):  Review User Review Instant Review Result   Tamara Catarino 1/14/2022  4:43 PM Reviewed PDMP [1]     Last Controlled Substance Monitoring Documentation      Office Visit from 1/14/2022 in [facility]   Periodic Controlled Substance Monitoring Possible medication side effects, risk of tolerance/dependence & alternative treatments discussed., No signs of potential drug abuse or diversion identified. , Assessed functional status. , Random urine drug screen sent today., Obtaining appropriate analgesic effect of treatment. filed at 01/14/2022 1643          Counseled patient regarding above diagnosis, including possible risks and complications, especially if left uncontrolled. Counseled patient as appropriate and relevant regarding any possible side effects, risks, and alternatives to treatment; the patient verbalizes understanding, and is in agreement with the plan as detailed above. All educational materials and instructions were discussed and included on the After Visit Summary. All questions answered to the patient's satisfaction. The patient was advised to call for any concerns prior to next appointment. Return in about 4 weeks (around 2/11/2022) for labs.     31 minutes was spent precharting, face-to-face with patient, and documenting on day of encounter. This provider and patient were wearing surgical masks and practiced social distancing when appropriate during visit due to COVID-19 pandemic. Subjective:     Chief Complaint   Patient presents with    Establish Care    Hypertension    Other     was dismissed by Dr. Kelechi Alcantara       Patient needs new PCP. Last PCP left practice. Follows with Ali Sender in [place] for bipolar disorder. Prescribed amitriptyline. Tried to ingest a handful or baclofen to hurt herself due to  cheating. Has history of seizures from trigeminal neuralgia and surgeries per patient. Treated with oxcarbazepine, gabapentin. Does not fully help her symptoms. Still has constant left sided facial pain with frequent exacerbations. Review of Systems   HENT: Positive for trouble swallowing. Positive for left facial pain. Respiratory: Negative for shortness of breath. Cardiovascular: Negative for chest pain and palpitations. Gastrointestinal: Positive for nausea. Negative for vomiting. Positive for reflux. Musculoskeletal: Positive for neck pain (left). Neurological: Positive for numbness (left face). Negative for seizures and weakness. Medical History:   History reviewed and updated as needed.     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        Past Medical History:   Diagnosis Date    Acid reflux     Depression     Self abuse    History of suicide attempt 1/14/2022    Ingestion of substance 1/14/2022    Trigeminal neuralgia 2005    diagnosed       Past Surgical History:   Procedure Laterality Date    BRAIN SURGERY      decompression trigeminal nerve    CARPAL TUNNEL RELEASE Bilateral     FACIAL NERVE DECOMPRESSION      HYSTERECTOMY         Family History   Problem Relation Age of Onset    Heart Disease Mother     Heart Disease Father     No Known Problems Sister     No Known Problems Brother     Blindness Son     Premature Birth Son     No Known Problems Sister     No Known Problems Sister     No Known Problems Brother        Medications:     Current Outpatient Medications:     Alum & Mag Hydroxide-Simeth (MYLANTA PO), Take by mouth, Disp: , Rfl:     baclofen (LIORESAL) 10 MG tablet, TK 2 TS PO QPM, Disp: , Rfl:     OXcarbazepine (TRILEPTAL) 600 MG tablet, TK 1 T PO TID, Disp: , Rfl: 1    gabapentin (NEURONTIN) 800 MG tablet, Take 800 mg by mouth 3 times daily. , Disp: , Rfl:     amitriptyline (ELAVIL) 25 MG tablet, Take 25 mg by mouth nightly, Disp: , Rfl:     omeprazole (PRILOSEC) 20 MG delayed release capsule, Take 1 capsule by mouth every morning (before breakfast) (Patient not taking: Reported on 1/14/2022), Disp: 30 capsule, Rfl: 5    Allergies:      Allergies   Allergen Reactions    Tylenol [Acetaminophen] Nausea And Vomiting       Social History:     Social History     Socioeconomic History    Marital status:      Spouse name: Not on file    Number of children: 1    Years of education: 12    Highest education level: Not on file   Occupational History    Not on file   Tobacco Use    Smoking status: Never Smoker    Smokeless tobacco: Never Used   Vaping Use    Vaping Use: Never used   Substance and Sexual Activity    Alcohol use: No    Drug use: No    Sexual activity: Not on file   Other Topics Concern    Not on file   Social History Narrative    Not on file     Social Determinants of Health     Financial Resource Strain: Low Risk     Difficulty of Paying Living Expenses: Not hard at all   Food Insecurity: No Food Insecurity    Worried About [address] [employer] in the Last Year: Never true    Ran Out of Food in the Last Year: Never true   Transportation Needs: Unmet Transportation Needs    Lack of Transportation (Medical): Yes    Lack of Transportation (Non-Medical): Yes   Physical Activity:     Days of Exercise per Week: Not on file    Minutes of Exercise per Session: Not on file   Stress:     Feeling of Stress : Not on file   Social Connections:     Frequency of Communication with Friends and Family: Not on file    Frequency of Social Gatherings with Friends and Family: Not on file    Attends Anabaptism Services: Not on file    Active Member of Clubs or Organizations: Not on file    Attends Club or Organization Meetings: Not on file    Marital Status: Not on file   Intimate Partner Violence:     Fear of Current or Ex-Partner: Not on file    Emotionally Abused: Not on file    Physically Abused: Not on file    Sexually Abused: Not on file   Housing Stability:     Unable to Pay for Housing in the Last Year: Not on file    Number of [place] in the Last Year: Not on file    Unstable Housing in the Last Year: Not on file       Physical Exam:     Vitals:    01/14/22 1553   BP: 136/74   Pulse: 102   Resp: 16   SpO2: 99%   Weight: 150 lb (68 kg)   Height: 5' 4\" (1.626 m)       BP Readings from Last 3 Encounters:   01/14/22 136/74   05/13/21 126/70   04/29/21 132/80       Wt Readings from Last 3 Encounters:   01/14/22 150 lb (68 kg)   05/13/21 169 lb (76.7 kg)   04/29/21 169 lb (76.7 kg)        Physical Exam  Vitals and nursing note reviewed. Constitutional:       General: She is not in acute distress. Appearance: Normal appearance. She is not ill-appearing or diaphoretic. HENT:      Head:      Comments: Severe pain with light touch of left face. Cardiovascular:      Rate and Rhythm: Normal rate and regular rhythm. Pulses: Normal pulses. Heart sounds: Normal heart sounds. Pulmonary:      Effort: Pulmonary effort is normal. No respiratory distress.       Breath sounds: Normal

## 2022-01-14 NOTE — Clinical Note
[address]  Phone: [phone]  Fax: [phone]    Ofelia Salvador MD        January 14, 2022     Patient: Hao Shanks   YOB: 1955   Date of Visit: 1/14/2022       To Whom It May Concern: It is my medical opinion that Barrera Caldwell {Work release (duty restriction):64377}. If you have any questions or concerns, please don't hesitate to call.     Sincerely,        Ofelia Salvador MD

## 2022-01-18 NOTE — ASSESSMENT & PLAN NOTE
No current suicidal ideations  Took overdose of her medications, wanted to sleep and not wake up  Developed safety plan, advised to have  lock up medications, not just hide them from her

## 2022-01-31 NOTE — ASSESSMENT & PLAN NOTE
Associated with trigeminal neuralgia per patient  Continue oxcarbazepine, gabapentin  Referral to neuro

## 2022-01-31 NOTE — ASSESSMENT & PLAN NOTE
Follows with psych  Appears to be treated for unipolar depression  Requested records  See safety plan below

## 2022-04-26 DIAGNOSIS — K21.9 GASTROESOPHAGEAL REFLUX DISEASE, UNSPECIFIED WHETHER ESOPHAGITIS PRESENT: ICD-10-CM

## 2022-04-29 RX ORDER — OMEPRAZOLE 20 MG/1
20 CAPSULE, DELAYED RELEASE ORAL
Qty: 30 CAPSULE | Refills: 1 | Status: SHIPPED
Start: 2022-04-29 | End: 2022-06-24 | Stop reason: ALTCHOICE

## 2022-04-29 NOTE — TELEPHONE ENCOUNTER
Refills sent. However patient was supposed to return 4 weeks after last visit. Needs to schedule visit and get labs a couple days before.

## 2022-05-04 NOTE — TELEPHONE ENCOUNTER
Spoke with pts son he is going to check on his mother and see if he can convince her to go to the er.

## 2022-05-04 NOTE — TELEPHONE ENCOUNTER
Pt states she has been sick for past 3 days and has not been able to keep anything down. She states she cant walk or open her eyes. She states she has to use wall to walk. I explained to her she should go to emergency room and she stated that  told her they cant afford it,  I tried to tell her that her health is important and she needs to go and they have different programs to help patients to pay for their treatment.

## 2022-05-05 ENCOUNTER — TELEPHONE (OUTPATIENT)
Dept: PRIMARY CARE CLINIC | Age: 67
End: 2022-05-05

## 2022-05-05 NOTE — TELEPHONE ENCOUNTER
Late entry. Called patient immediately after Bobby Bal. Confirmed symptoms patient was experiencing. Has been unable to keep any fluids down. Encouraged patient to go to the ER. Discussed patient's concerns regarding her  and ER cost.  Patient denies feeling unsafe at home. She is aware of assistance programs at hospital.  She declines to go to the ER tonight despite risks discussed of severe illness or disability and death. Patient reports she will continue to try to take fluids and will go to the ER tomorrow if she does not improve.

## 2022-05-05 NOTE — TELEPHONE ENCOUNTER
Pt called in stated she is not any better. Still cant keep anything down. Needs a prescription for a walker.  Please send to [address].  She states if she is not any better by tonight she is going to go to Hospital.

## 2022-05-05 NOTE — TELEPHONE ENCOUNTER
Called patient after clinic. Patient did not answer and was unable to leave voicemail as box was full. Attempted to call son emergency contact, also did not answer. Left voicemail to let them know I was checking in on [place] and if there were any concerns tonight to return call and it would put them through to the emergency after-hours line.

## 2022-05-06 ENCOUNTER — TELEPHONE (OUTPATIENT)
Dept: PRIMARY CARE CLINIC | Age: 67
End: 2022-05-06

## 2022-05-06 NOTE — TELEPHONE ENCOUNTER
Patient is calling to say that she is worse now than when she went to the hospital. Having a head ache, ears popping, so much pain. She states that she is in so much pain and her head is pounding and so bad, she is unable to see. She states that [facility] did nothing for her.  She is crying so hard she is hard to understand

## 2022-05-06 NOTE — TELEPHONE ENCOUNTER
Patient states that she has already called the neurologist, patient states the only thing that she told her to do was to pray. She states that she was told there was nothing else that could be done for her.

## 2022-05-06 NOTE — TELEPHONE ENCOUNTER
Patient was recently seen by neurology 4/27. Has she tried to call her neurologist to discuss the symptoms given [facility] thought her symptoms were due to her trigeminal neuralgia? If not, I would recommend doing so ASAP before they closed for the weekend. If they do not answer, I would recommend she leaves message for on-call doctor.

## 2022-05-06 NOTE — TELEPHONE ENCOUNTER
----- Message from Omari Ortegaaubrey sent at 5/6/2022  4:22 PM EDT -----  Subject: Message to Provider    QUESTIONS  Information for Provider? Patient is calling with Urgent symptoms. BP per   the patient was 194/94 . Patient states that she was in ED for vomiting   and High BP last night and sent home. Patient refused A/H on call to Dr. Jerri Blum. Patient hung up on ECC. Please call the patient  ---------------------------------------------------------------------------  --------------  CALL BACK INFO  What is the best way for the office to contact you? OK to leave message on   voicemail  Preferred Call Back Phone Number? [phone]  ---------------------------------------------------------------------------  --------------  SCRIPT ANSWERS  Relationship to Patient?  Self

## 2022-05-06 NOTE — TELEPHONE ENCOUNTER
Carlos Sterling called and said that she did go to the emergency room yesterday, like you suggested, and she got IV for 5 hours and they did all kinds of tests and ended up telling her that her problem is her trigeminal neuralgia and there is nothing they can do for her. She said her stress is causing it to be worse. Still can't hold food down. She said she drank her Ensure this morning and could not keep it down. Still can't open her eyes and she is crawling around bc she is dizzy. She doesn't know what to do now.

## 2022-05-06 NOTE — TELEPHONE ENCOUNTER
Called patient back. Asked her when she last spoke with neurologist, patient reports she called last week. Patient has not called with new symptoms. When I advised her she should do so, patient declines and states \"they tell me there is nothing else they can do for me\". She also states [facility] was not able to do anything for her and told her she only needs treatment of her trigeminal neuralgia. When discussed with patient recommendation to see a different neurologist or pain management, patient states \"I am not sure how I would get there\" and does not seem open to trying to find specialist to help us manage her trigeminal neuralgia pain. Patient also reports her blood pressure is significantly elevated 180s over 110s. When I recommended that she go back to the ED given her symptoms and elevated blood pressures, patient declines to go back to [facility]. Discussed options including other hospitals, patient states she would like to go to [place] but does not have any way of getting there and will not call an ambulance. She states \"I am going to walk there\". Advised patient that I did not recommend she walk anywhere in her condition. Patient then states \"I cannot walk, I can barely crawl\". She does report that she was previously at [facility] and would like to go there but cannot tell me any more information about it. Patient is tearful, stating she is \"hiding in the bathroom\" because her  yells at her. When I ask patient if she is safe, she states she does not have any concerns about her safety. States she hides from him because he yells and she does not want that to make her blood pressure go up. Discussed with patient again ED recommendations, patient again declines despite discussion of risks including morbidity and mortality. Advised patient I would look into the [facility] care unit and call her back early next week.   Advised her to call on-call/after-hours line over the weekend if she has an emergency. Patient states \"I am not going to do that, you have done enough. You do not need to call me back. I will just find another doctor. I have bothered you too much. \"  Advised her that I am her primary care physician and responsible for making sure that she gets the best care. Provided emotional support and let her know that I will work with her to help her improve her pain.

## 2022-05-10 ENCOUNTER — TELEPHONE (OUTPATIENT)
Dept: PRIMARY CARE CLINIC | Age: 67
End: 2022-05-10

## 2022-05-10 DIAGNOSIS — G50.0 TRIGEMINAL NEURALGIA: Primary | ICD-10-CM

## 2022-05-10 DIAGNOSIS — G50.0 ANESTHESIA DOLOROSA IN DISTRIBUTION OF TRIGEMINAL NERVE: ICD-10-CM

## 2022-05-10 NOTE — TELEPHONE ENCOUNTER
Pt calling would like to know if she could get a rx called in for her dizziness and would also like a rx for a walker with a seat sent into [facility], if possible.

## 2022-05-11 RX ORDER — MECLIZINE HCL 12.5 MG/1
12.5 TABLET ORAL 3 TIMES DAILY PRN
Qty: 15 TABLET | Refills: 0 | Status: SHIPPED | OUTPATIENT
Start: 2022-05-11 | End: 2022-05-21

## 2022-05-11 NOTE — TELEPHONE ENCOUNTER
Provided order for rollator walker. Patient has appointment on 5/25. Will send meclizine as needed for dizziness for patient to use in addition to other trigeminal neuralgia medications. Was seen in ED for acute dizziness and discharged home after unremarkable workup. Patient has refused referral for second opinion on treatment of condition at this time.

## 2022-05-16 DIAGNOSIS — R11.0 NAUSEA: Primary | ICD-10-CM

## 2022-05-16 RX ORDER — ONDANSETRON 4 MG/1
4 TABLET, ORALLY DISINTEGRATING ORAL DAILY
COMMUNITY
Start: 2022-05-10 | End: 2022-05-16 | Stop reason: SDUPTHER

## 2022-05-16 RX ORDER — ONDANSETRON 4 MG/1
4 TABLET, ORALLY DISINTEGRATING ORAL EVERY 8 HOURS PRN
Qty: 10 TABLET | Refills: 0 | Status: SHIPPED
Start: 2022-05-16 | End: 2022-06-24 | Stop reason: ALTCHOICE

## 2022-05-16 NOTE — TELEPHONE ENCOUNTER
Last Appointment:  1/14/2022  Future Appointments   Date Time Provider Layton King   5/25/2022  2:00 PM Silas Hale MD [facility]      Patient is requesting a refill for ondansetron 4 mg tab PO daily the ER prescribed for her do to nausea. Pended for your signature 4 mg tab PO 1 tab daily for 10 days.     Electronically signed by Cassidy Johnson LPN on 7/45/7115 at 6:12 PM

## 2022-05-17 DIAGNOSIS — F31.9 BIPOLAR 1 DISORDER (HCC): ICD-10-CM

## 2022-05-17 DIAGNOSIS — Z51.81 MEDICATION MONITORING ENCOUNTER: ICD-10-CM

## 2022-05-17 DIAGNOSIS — R56.9 SEIZURE (HCC): ICD-10-CM

## 2022-05-17 DIAGNOSIS — G50.0 ANESTHESIA DOLOROSA IN DISTRIBUTION OF TRIGEMINAL NERVE: ICD-10-CM

## 2022-05-17 DIAGNOSIS — G50.0 TRIGEMINAL NEURALGIA: ICD-10-CM

## 2022-05-17 LAB
ALBUMIN SERPL-MCNC: 4.9 G/DL (ref 3.5–5.2)
ALP BLD-CCNC: 109 U/L (ref 35–104)
ALT SERPL-CCNC: 11 U/L (ref 0–32)
ANION GAP SERPL CALCULATED.3IONS-SCNC: 12 MMOL/L (ref 7–16)
AST SERPL-CCNC: 18 U/L (ref 0–31)
BASOPHILS ABSOLUTE: 0.04 E9/L (ref 0–0.2)
BASOPHILS RELATIVE PERCENT: 0.6 % (ref 0–2)
BILIRUB SERPL-MCNC: <0.2 MG/DL (ref 0–1.2)
BUN BLDV-MCNC: 25 MG/DL (ref 6–23)
CALCIUM SERPL-MCNC: 9.2 MG/DL (ref 8.6–10.2)
CHLORIDE BLD-SCNC: 96 MMOL/L (ref 98–107)
CO2: 25 MMOL/L (ref 22–29)
CREAT SERPL-MCNC: 0.5 MG/DL (ref 0.5–1)
EOSINOPHILS ABSOLUTE: 0.04 E9/L (ref 0.05–0.5)
EOSINOPHILS RELATIVE PERCENT: 0.6 % (ref 0–6)
GFR AFRICAN AMERICAN: >60
GFR NON-AFRICAN AMERICAN: >60 ML/MIN/1.73
GLUCOSE BLD-MCNC: 104 MG/DL (ref 74–99)
HCT VFR BLD CALC: 38.1 % (ref 34–48)
HEMOGLOBIN: 12.5 G/DL (ref 11.5–15.5)
IMMATURE GRANULOCYTES #: 0.01 E9/L
IMMATURE GRANULOCYTES %: 0.1 % (ref 0–5)
LYMPHOCYTES ABSOLUTE: 2.47 E9/L (ref 1.5–4)
LYMPHOCYTES RELATIVE PERCENT: 36.1 % (ref 20–42)
MCH RBC QN AUTO: 29 PG (ref 26–35)
MCHC RBC AUTO-ENTMCNC: 32.8 % (ref 32–34.5)
MCV RBC AUTO: 88.4 FL (ref 80–99.9)
MONOCYTES ABSOLUTE: 0.49 E9/L (ref 0.1–0.95)
MONOCYTES RELATIVE PERCENT: 7.2 % (ref 2–12)
NEUTROPHILS ABSOLUTE: 3.79 E9/L (ref 1.8–7.3)
NEUTROPHILS RELATIVE PERCENT: 55.4 % (ref 43–80)
PDW BLD-RTO: 14 FL (ref 11.5–15)
PLATELET # BLD: 300 E9/L (ref 130–450)
PMV BLD AUTO: 10.4 FL (ref 7–12)
POTASSIUM SERPL-SCNC: 4.5 MMOL/L (ref 3.5–5)
RBC # BLD: 4.31 E12/L (ref 3.5–5.5)
SODIUM BLD-SCNC: 133 MMOL/L (ref 132–146)
TOTAL PROTEIN: 7.9 G/DL (ref 6.4–8.3)
TSH SERPL DL<=0.05 MIU/L-ACNC: 3.45 UIU/ML (ref 0.27–4.2)
WBC # BLD: 6.8 E9/L (ref 4.5–11.5)

## 2022-05-18 LAB
6-MONOACETYLMORPHINE, URINE: NOT DETECTED
ALCOHOL URINE: NOT DETECTED
AMPHETAMINE SCREEN, URINE: NOT DETECTED
BARBITURATE SCREEN URINE: NOT DETECTED
BENZODIAZEPINE SCREEN, URINE: NOT DETECTED
BUPRENORPHINE URINE: NOT DETECTED
CANNABINOID SCREEN URINE: POSITIVE
COCAINE METABOLITE SCREEN URINE: NOT DETECTED
COMMENT: NORMAL
FENTANYL SCREEN, URINE: NOT DETECTED
INTEGRITY CHECK, CREATININE, URINE: 98
INTEGRITY CHECK, OXIDANT, URINE: <40
INTEGRITY CHECK, PH, URINE: 5.8 (ref 4.5–9)
INTEGRITY CHECK, SPECIFIC GRAVITY, URINE: 1.02 (ref 1–1.03)
INTEGRITY CHECK, SPECIMEN INTEGRITY, URINE: ABNORMAL
Lab: ABNORMAL
METHADONE SCREEN, URINE: NOT DETECTED
OPIATE SCREEN URINE: NOT DETECTED
OXYCODONE URINE: NOT DETECTED
PHENCYCLIDINE SCREEN URINE: NOT DETECTED
THC NORMALIZED, QUANTITIATIVE, URINE: 207.1
THC-COOH, QUANTITATIVE, URINE: 203
TRAMADOL SCREEN URINE: NOT DETECTED

## 2022-05-21 LAB — OXCARBAZEPINE: 39 UG/ML (ref 3–35)

## 2022-05-25 ENCOUNTER — OFFICE VISIT (OUTPATIENT)
Dept: PRIMARY CARE CLINIC | Age: 67
End: 2022-05-25
Payer: COMMERCIAL

## 2022-05-25 VITALS
SYSTOLIC BLOOD PRESSURE: 160 MMHG | HEIGHT: 64 IN | WEIGHT: 149 LBS | BODY MASS INDEX: 25.44 KG/M2 | TEMPERATURE: 97.8 F | OXYGEN SATURATION: 95 % | HEART RATE: 102 BPM | DIASTOLIC BLOOD PRESSURE: 100 MMHG | RESPIRATION RATE: 18 BRPM

## 2022-05-25 DIAGNOSIS — K92.1 HEMATOCHEZIA: ICD-10-CM

## 2022-05-25 DIAGNOSIS — G50.0 TRIGEMINAL NEURALGIA: Primary | ICD-10-CM

## 2022-05-25 DIAGNOSIS — G50.0 ANESTHESIA DOLOROSA IN DISTRIBUTION OF TRIGEMINAL NERVE: ICD-10-CM

## 2022-05-25 DIAGNOSIS — K21.9 GASTROESOPHAGEAL REFLUX DISEASE, UNSPECIFIED WHETHER ESOPHAGITIS PRESENT: ICD-10-CM

## 2022-05-25 DIAGNOSIS — R10.84 GENERALIZED ABDOMINAL PAIN: ICD-10-CM

## 2022-05-25 DIAGNOSIS — I10 PRIMARY HYPERTENSION: ICD-10-CM

## 2022-05-25 PROCEDURE — 3017F COLORECTAL CA SCREEN DOC REV: CPT | Performed by: STUDENT IN AN ORGANIZED HEALTH CARE EDUCATION/TRAINING PROGRAM

## 2022-05-25 PROCEDURE — 1036F TOBACCO NON-USER: CPT | Performed by: STUDENT IN AN ORGANIZED HEALTH CARE EDUCATION/TRAINING PROGRAM

## 2022-05-25 PROCEDURE — G8419 CALC BMI OUT NRM PARAM NOF/U: HCPCS | Performed by: STUDENT IN AN ORGANIZED HEALTH CARE EDUCATION/TRAINING PROGRAM

## 2022-05-25 PROCEDURE — G8427 DOCREV CUR MEDS BY ELIG CLIN: HCPCS | Performed by: STUDENT IN AN ORGANIZED HEALTH CARE EDUCATION/TRAINING PROGRAM

## 2022-05-25 PROCEDURE — 99214 OFFICE O/P EST MOD 30 MIN: CPT | Performed by: STUDENT IN AN ORGANIZED HEALTH CARE EDUCATION/TRAINING PROGRAM

## 2022-05-25 RX ORDER — MECLIZINE HCL 12.5 MG/1
12.5 TABLET ORAL 3 TIMES DAILY PRN
COMMUNITY
End: 2022-06-10 | Stop reason: SDUPTHER

## 2022-05-25 RX ORDER — COVID-19 ANTIGEN TEST
KIT MISCELLANEOUS
COMMUNITY

## 2022-05-25 RX ORDER — HYDROCODONE BITARTRATE AND ACETAMINOPHEN 5; 325 MG/1; MG/1
TABLET ORAL
COMMUNITY
Start: 2022-05-05 | End: 2022-08-01

## 2022-05-25 RX ORDER — TRAZODONE HYDROCHLORIDE 50 MG/1
TABLET ORAL
COMMUNITY
Start: 2022-04-27 | End: 2022-06-24 | Stop reason: SDUPTHER

## 2022-05-25 ASSESSMENT — PATIENT HEALTH QUESTIONNAIRE - PHQ9
SUM OF ALL RESPONSES TO PHQ9 QUESTIONS 1 & 2: 6
SUM OF ALL RESPONSES TO PHQ QUESTIONS 1-9: 15
SUM OF ALL RESPONSES TO PHQ QUESTIONS 1-9: 15
7. TROUBLE CONCENTRATING ON THINGS, SUCH AS READING THE NEWSPAPER OR WATCHING TELEVISION: 0
SUM OF ALL RESPONSES TO PHQ QUESTIONS 1-9: 15
9. THOUGHTS THAT YOU WOULD BE BETTER OFF DEAD, OR OF HURTING YOURSELF: 0
4. FEELING TIRED OR HAVING LITTLE ENERGY: 3
1. LITTLE INTEREST OR PLEASURE IN DOING THINGS: 3
SUM OF ALL RESPONSES TO PHQ QUESTIONS 1-9: 15
8. MOVING OR SPEAKING SO SLOWLY THAT OTHER PEOPLE COULD HAVE NOTICED. OR THE OPPOSITE, BEING SO FIGETY OR RESTLESS THAT YOU HAVE BEEN MOVING AROUND A LOT MORE THAN USUAL: 0
6. FEELING BAD ABOUT YOURSELF - OR THAT YOU ARE A FAILURE OR HAVE LET YOURSELF OR YOUR FAMILY DOWN: 0
10. IF YOU CHECKED OFF ANY PROBLEMS, HOW DIFFICULT HAVE THESE PROBLEMS MADE IT FOR YOU TO DO YOUR WORK, TAKE CARE OF THINGS AT HOME, OR GET ALONG WITH OTHER PEOPLE: 0
5. POOR APPETITE OR OVEREATING: 3
2. FEELING DOWN, DEPRESSED OR HOPELESS: 3
3. TROUBLE FALLING OR STAYING ASLEEP: 3

## 2022-05-25 ASSESSMENT — ENCOUNTER SYMPTOMS
BLOOD IN STOOL: 1
DIARRHEA: 1
CONSTIPATION: 1

## 2022-05-25 NOTE — PROGRESS NOTES
ESTABLISHED PRIMARY CARE VISIT    22  Name: Iram Peña   : 1955   Age: 61 y.o. Sex: female        Assessment & Plan:     Problem List Items Addressed This Visit        Circulatory    Hypertension     Previously controlled without medication  Now elevated today and at home  Possibly secondary to acute pain vs worsening hypertension  Continue to monitor blood pressure at home  Return for close follow-up            Digestive    GERD (gastroesophageal reflux disease)     Intermittently associated with dysphagia  Previously referred to GI but did not schedule  New referral to GI  Continue PPI         Relevant Medications    meclizine (ANTIVERT) 12.5 MG tablet       Nervous and Auditory    Trigeminal neuralgia - Primary     Uncontrolled  Continue oxcarbazepine, gabapentin  OARRS reviewed, CSA signed, UDS ordered  Consider neuro second opinion, patient declines today  Has follow-up with neuro coming up         Relevant Medications    traZODone (DESYREL) 50 MG tablet    Handicap Placard MISC    Other Relevant Orders    Cane adjustable narrow base quad       Other    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    Relevant Medications    Handicap Placard MISC    Other Relevant Orders    Cane adjustable narrow base quad      Other Visit Diagnoses     Hematochezia        Previously referred to GI for GERD and dysphagia but did not schedule  New referral to GI  Refused abdominal CT due to severe claustrophobia  Hgb stable    Relevant Orders    Amb External Referral To Gastroenterology    Generalized abdominal pain        Previously referred to GI for GERD and dysphagia but did not schedule  New referral to GI  Continue PPI  Refused abdominal CT due to severe claustrophobia    Relevant Orders    Amb External Referral To Gastroenterology          Counseled patient regarding above diagnosis, including possible risks and complications, especially if left uncontrolled.   Counseled patient as appropriate and relevant regarding any possible side effects, risks, and alternatives to treatment; the patient verbalizes understanding, and is in agreement with the plan as detailed above. All educational materials and instructions were discussed and included on the After Visit Summary. All questions answered to the patient's satisfaction. The patient was advised to call for any concerns prior to next appointment. Return in about 4 weeks (around 6/22/2022) for follow-up. This provider and patient were wearing surgical masks and practiced social distancing when appropriate during visit due to COVID-19 pandemic. Subjective:     Chief Complaint   Patient presents with    Follow-up       Reports abdominal pain and green mucus discharge from rectum for a couple weeks  Reports discharge will come out whenever she stands up or turns over  Sometimes green discharge has blood mixed in  [place] to ED but reports this was not worked up  Only worked up her headache and dizziness    Home blood pressures 160/90      Review of Systems   Constitutional: Positive for activity change and appetite change. HENT: Positive for trouble swallowing. Positive for left facial pain. Respiratory: Negative for shortness of breath. Cardiovascular: Negative for chest pain and palpitations. Gastrointestinal: Positive for blood in stool, constipation, diarrhea and nausea. Negative for vomiting. Positive for reflux. Musculoskeletal: Positive for neck pain (left). Neurological: Positive for numbness (left face). Negative for seizures and weakness.        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        Past Medical History:   Diagnosis Date    Acid reflux     Depression     Self abuse    History of suicide attempt 1/14/2022    Ingestion of substance 1/14/2022    Trigeminal neuralgia 2005    diagnosed       Past Surgical History:   Procedure Laterality Date    BRAIN SURGERY      decompression trigeminal nerve    CARPAL TUNNEL RELEASE Bilateral     FACIAL NERVE DECOMPRESSION      HYSTERECTOMY         Family History   Problem Relation Age of Onset    Heart Disease Mother     Heart Disease Father     No Known Problems Sister     No Known Problems Brother     Blindness Son     Premature Birth Son     No Known Problems Sister     No Known Problems Sister     No Known Problems Brother        Medications:     Current Outpatient Medications:     meclizine (ANTIVERT) 12.5 MG tablet, Take 12.5 mg by mouth 3 times daily as needed, Disp: , Rfl:     Naproxen Sodium (ALEVE) 220 MG CAPS, Take by mouth, Disp: , Rfl:     omeprazole (PRILOSEC) 20 MG delayed release capsule, Take 1 capsule by mouth every morning (before breakfast), Disp: 30 capsule, Rfl: 1    OXcarbazepine (TRILEPTAL) 600 MG tablet, Take 1 tablet by mouth 3 times daily, Disp: 90 tablet, Rfl: 2    gabapentin (NEURONTIN) 800 MG tablet, Take 1 tablet by mouth 3 times daily for 90 days. , Disp: 90 tablet, Rfl: 2    baclofen (LIORESAL) 10 MG tablet, TK 2 TS PO QPM, Disp: , Rfl:     traZODone (DESYREL) 50 MG tablet, TAKE 1 TABLET BY MOUTH EVERY NIGHT AT BEDTIME, Disp: , Rfl:     HYDROcodone-acetaminophen (NORCO) 5-325 MG per tablet, take 1 tablet by mouth every 6 hours AS NEEDED FOR PAIN, Disp: , Rfl:     ondansetron (ZOFRAN-ODT) 4 MG disintegrating tablet, Take 1 tablet by mouth every 8 hours as needed for Nausea or Vomiting (Patient not taking: Reported on 5/25/2022), Disp: 10 tablet, Rfl: 0    Misc.  Devices (ROLLER WALKER) MISC, Rollator walker with seat (Patient not taking: Reported on 5/25/2022), Disp: 1 each, Rfl: 0    Alum & Mag Hydroxide-Simeth (MYLANTA PO), Take by mouth (Patient not taking: Reported on 5/25/2022), Disp: , Rfl:     amitriptyline (ELAVIL) 25 MG tablet, Take 25 mg by mouth nightly (Patient not taking: Reported on 5/25/2022), Disp: , Rfl:     Allergies: Allergies   Allergen Reactions    Tylenol [Acetaminophen] Nausea And Vomiting       Social History:     Social History     Socioeconomic History    Marital status:      Spouse name: Not on file    Number of children: 1    Years of education: 15    Highest education level: Not on file   Occupational History    Not on file   Tobacco Use    Smoking status: Never Smoker    Smokeless tobacco: Never Used   Vaping Use    Vaping Use: Never used   Substance and Sexual Activity    Alcohol use: No    Drug use: No    Sexual activity: Not on file   Other Topics Concern    Not on file   Social History Narrative    Not on file     Social Determinants of Health     Financial Resource Strain:     Difficulty of Paying Living Expenses: Not on file   Food Insecurity:     Worried About Running Out of Food in the Last Year: Not on file    Arndi of Food in the Last Year: Not on file   Transportation Needs:     Lack of Transportation (Medical): Not on file    Lack of Transportation (Non-Medical):  Not on file   Physical Activity:     Days of Exercise per Week: Not on file    Minutes of Exercise per Session: Not on file   Stress:     Feeling of Stress : Not on file   Social Connections:     Frequency of Communication with Friends and Family: Not on file    Frequency of Social Gatherings with Friends and Family: Not on file    Attends Taoism Services: Not on file    Active Member of Clubs or Organizations: Not on file    Attends Club or Organization Meetings: Not on file    Marital Status: Not on file   Intimate Partner Violence:     Fear of Current or Ex-Partner: Not on file   Brii Gunn Emotionally Abused: Not on file    Physically Abused: Not on file    Sexually Abused: Not on file   Housing Stability:     Unable to Pay for Housing in the Last Year: Not on file    Number of Places Lived in the Last Year: Not on file    Unstable Housing in the Last Year: Not on file       Physical Exam:     Vitals:    05/25/22 1352 05/25/22 1442   BP: (!) 144/96 (!) 160/100   Pulse: (!) 102    Resp: 18    Temp: 97.8 °F (36.6 °C)    TempSrc: Temporal    SpO2: 95%    Weight: 149 lb (67.6 kg)    Height: 5' 4\" (1.626 m)        BP Readings from Last 3 Encounters:   05/25/22 (!) 160/100   01/14/22 136/74   05/13/21 126/70       Wt Readings from Last 3 Encounters:   05/25/22 149 lb (67.6 kg)   01/14/22 150 lb (68 kg)   05/13/21 169 lb (76.7 kg)       Physical Exam  Vitals and nursing note reviewed. Constitutional:       General: She is not in acute distress. Appearance: Normal appearance. She is overweight. She is not ill-appearing or diaphoretic. Cardiovascular:      Rate and Rhythm: Normal rate and regular rhythm. Heart sounds: Normal heart sounds. Pulmonary:      Effort: Pulmonary effort is normal. No respiratory distress. Breath sounds: Normal breath sounds. Abdominal:      General: Bowel sounds are normal. There is no distension. Palpations: There is no mass. Tenderness: There is generalized abdominal tenderness. There is no guarding or rebound. Hernia: No hernia is present. Musculoskeletal:      Right lower leg: No edema. Left lower leg: No edema. Skin:     General: Skin is warm and dry. Neurological:      Mental Status: She is alert and oriented to person, place, and time. Psychiatric:         Attention and Perception: Attention and perception normal.         Mood and Affect: Mood is anxious. Affect is tearful. Speech: Speech normal.         Behavior: Behavior normal. Behavior is cooperative. Thought Content:  Thought content normal. Testing:     Orders Placed This Encounter   Procedures    Amb External Referral To Gastroenterology     Referral Priority:   Routine     Referral Type:   Consult for Advice and Opinion     Referral Reason:   Specialty Services Required     Referred to Provider:   Aline Rosales MD     Requested Specialty:   Gastroenterology     Number of Visits Requested:   1    Cane adjustable narrow base quad        No results found for this or any previous visit (from the past 24 hour(s)).

## 2022-05-25 NOTE — PATIENT INSTRUCTIONS
[facility]  Address: [address], [address] [place] [address] Drive  Phone: [phone]      [facility]  Address: [id number] [facility], [address] [place] [address] Drive  Phone: [phone]

## 2022-06-07 ENCOUNTER — TELEPHONE (OUTPATIENT)
Dept: PRIMARY CARE CLINIC | Age: 67
End: 2022-06-07

## 2022-06-07 NOTE — TELEPHONE ENCOUNTER
Pt called in states she made an apt with neurologists but she canceled the apt today, due to loose stool pt states its a jelly texture and very black. This has been going on for 5 days, pt states it started out having bloody strings in it but that went away she is very nauseated and getting dizzy spells the cramping is unbearable . Also pt states Loose Gianfranco when walking. After carfully reviewing you schedule and seeing no apt available I advised the pt to ED, or to go to the [facility] walk in clinic. Pt confirmed she would go to emergency department.

## 2022-06-08 ENCOUNTER — TELEPHONE (OUTPATIENT)
Dept: PRIMARY CARE CLINIC | Age: 67
End: 2022-06-08

## 2022-06-08 NOTE — TELEPHONE ENCOUNTER
----- Message from Charlyriangie Cathy sent at 6/8/2022  9:22 AM EDT -----  Subject: Appointment Request    Reason for Call: Routine ED Follow Up Visit    QUESTIONS  Type of Appointment? Established Patient  Reason for appointment request? Available appointments did not meet   patient need  Additional Information for Provider? Patient is needing to get in for an   ED follow up with in the next 3 to 5 days. She did not have a ride to take   the appointment for today. She was in the ER 6/7 for Colitis and Blood   pressure.   ---------------------------------------------------------------------------  --------------  CALL BACK INFO  What is the best way for the office to contact you? OK to leave message on   voicemail  Preferred Call Back Phone Number? [phone]  ---------------------------------------------------------------------------  --------------  SCRIPT ANSWERS  Relationship to Patient? Self  (Patient requests to see provider urgently. )? No  Do you have any questions for your primary care provider that need to be   answered prior to your appointment? No  Have you been diagnosed with, awaiting test results for, or told that you   are suspected of having COVID-19 (Coronavirus)? (If patient has tested   negative or was tested as a requirement for work, school, or travel and   not based on symptoms, answer no)? No  Within the past 10 days have you developed any of the following symptoms   (answer no if symptoms have been present longer than 10 days or began   more than 10 days ago)? Fever or Chills, Cough, Shortness of breath or   difficulty breathing, Loss of taste or smell, Sore throat, Nasal   congestion, Sneezing or runny nose, Fatigue or generalized body aches   (answer no if pain is specific to a body part e.g. back pain), Diarrhea,   Headache? No  Have you had close contact with someone with COVID-19 in the last 7 days?    No  (Service Expert  click yes below to proceed with [employer] As Usual Scheduling)?  Yes

## 2022-06-09 ENCOUNTER — TELEPHONE (OUTPATIENT)
Dept: PRIMARY CARE CLINIC | Age: 67
End: 2022-06-09

## 2022-06-09 NOTE — TELEPHONE ENCOUNTER
Last Appointment:  5/25/2022  Future Appointments   Date Time Provider Layton King   6/24/2022  2:00 PM Lindy Garcia MD [facility]      Patient called she was to hospital about her high blood pressure. They told her to make a follow up appointment with her PCP because they could not prescribe BP meds for her. Patient reports this mornings BP was 209/189. She is scheduled for 06/24/2022 and there are no openings on your schedule any sooner. Patient sounded very anxious.       Electronically signed by Stefan Lamar LPN on 4/4/5255 at 1:77 AM

## 2022-06-10 ENCOUNTER — OFFICE VISIT (OUTPATIENT)
Dept: PRIMARY CARE CLINIC | Age: 67
End: 2022-06-10
Payer: COMMERCIAL

## 2022-06-10 VITALS
RESPIRATION RATE: 20 BRPM | TEMPERATURE: 98.9 F | OXYGEN SATURATION: 96 % | HEIGHT: 64 IN | SYSTOLIC BLOOD PRESSURE: 190 MMHG | DIASTOLIC BLOOD PRESSURE: 98 MMHG | BODY MASS INDEX: 24.92 KG/M2 | WEIGHT: 146 LBS | HEART RATE: 103 BPM

## 2022-06-10 DIAGNOSIS — I10 PRIMARY HYPERTENSION: Primary | ICD-10-CM

## 2022-06-10 DIAGNOSIS — K58.0 IRRITABLE BOWEL SYNDROME WITH DIARRHEA: ICD-10-CM

## 2022-06-10 DIAGNOSIS — F41.9 ANXIETY: ICD-10-CM

## 2022-06-10 DIAGNOSIS — R10.84 GENERALIZED ABDOMINAL PAIN: ICD-10-CM

## 2022-06-10 DIAGNOSIS — R42 VERTIGO: ICD-10-CM

## 2022-06-10 PROBLEM — E21.3 HYPERPARATHYROIDISM (HCC): Status: ACTIVE | Noted: 2022-06-10

## 2022-06-10 PROCEDURE — 93000 ELECTROCARDIOGRAM COMPLETE: CPT | Performed by: STUDENT IN AN ORGANIZED HEALTH CARE EDUCATION/TRAINING PROGRAM

## 2022-06-10 PROCEDURE — 99214 OFFICE O/P EST MOD 30 MIN: CPT | Performed by: STUDENT IN AN ORGANIZED HEALTH CARE EDUCATION/TRAINING PROGRAM

## 2022-06-10 PROCEDURE — 3017F COLORECTAL CA SCREEN DOC REV: CPT | Performed by: STUDENT IN AN ORGANIZED HEALTH CARE EDUCATION/TRAINING PROGRAM

## 2022-06-10 PROCEDURE — G8427 DOCREV CUR MEDS BY ELIG CLIN: HCPCS | Performed by: STUDENT IN AN ORGANIZED HEALTH CARE EDUCATION/TRAINING PROGRAM

## 2022-06-10 PROCEDURE — G8419 CALC BMI OUT NRM PARAM NOF/U: HCPCS | Performed by: STUDENT IN AN ORGANIZED HEALTH CARE EDUCATION/TRAINING PROGRAM

## 2022-06-10 PROCEDURE — 1036F TOBACCO NON-USER: CPT | Performed by: STUDENT IN AN ORGANIZED HEALTH CARE EDUCATION/TRAINING PROGRAM

## 2022-06-10 RX ORDER — METOPROLOL SUCCINATE 25 MG/1
25 TABLET, EXTENDED RELEASE ORAL DAILY
Qty: 30 TABLET | Refills: 2 | Status: SHIPPED
Start: 2022-06-10 | End: 2022-08-01 | Stop reason: SDUPTHER

## 2022-06-10 RX ORDER — CAPSAICIN 0.025 %
CREAM (GRAM) TOPICAL
Qty: 120 G | Refills: 0 | Status: SHIPPED | OUTPATIENT
Start: 2022-06-10 | End: 2022-07-10

## 2022-06-10 RX ORDER — DICYCLOMINE HCL 20 MG
TABLET ORAL
COMMUNITY
Start: 2022-06-08 | End: 2022-06-24 | Stop reason: SDUPTHER

## 2022-06-10 RX ORDER — MECLIZINE HCL 12.5 MG/1
12.5 TABLET ORAL 3 TIMES DAILY PRN
Qty: 30 TABLET | Refills: 0 | Status: SHIPPED
Start: 2022-06-10 | End: 2022-08-01 | Stop reason: SDUPTHER

## 2022-06-10 ASSESSMENT — ENCOUNTER SYMPTOMS
CHEST TIGHTNESS: 1
SHORTNESS OF BREATH: 1
TROUBLE SWALLOWING: 1
NAUSEA: 1
VOMITING: 0
SHORTNESS OF BREATH: 0

## 2022-06-10 NOTE — PROGRESS NOTES
SAME DAY PRIMARY CARE VISIT    6/10/22  Name: Nicola Hernandez   : 1955   Age: 61 y.o. Sex: female        Assessment & Plan:     Problem List Items Addressed This Visit        Circulatory    Hypertension - Primary     Recent ED labs reviewed and overall unremarkable  Majority of her neurologic symptoms are chronic and unchanged compared to when she has been normotensive and as of the date of her last CT head 22  Do not suspect hypertensive emergency or need for emergent imaging  However patient was advised to return to ED if she has new symptoms or worsening of her chronic neurologic symptoms  Trial metoprolol XL 25mg given her concurrent mild intermittent tachycardia and anxiety  Continue home BP checks, advised to call if not improving  Return in 1 week for BP check in office         Relevant Medications    metoprolol succinate (TOPROL XL) 25 MG extended release tablet    Other Relevant Orders    EKG 12 lead (Completed)       Digestive    IBS (irritable bowel syndrome)    Relevant Medications    dicyclomine (BENTYL) 20 MG tablet    capsaicin (ZOSTRIX) 0.025 % cream    meclizine (ANTIVERT) 12.5 MG tablet       Other    Anxiety     Treating hypertension with beta blocker for additional potential anti-anxiety benefit  Will hold mood/neurogenic medication changes until further evaluation by neuro           Other Visit Diagnoses     Generalized abdominal pain        Further workup pending with GI   Provided topical capsaicin in addition to Bentyl and PPI    Relevant Medications    capsaicin (ZOSTRIX) 0.025 % cream    Vertigo        Relevant Medications    meclizine (ANTIVERT) 12.5 MG tablet          Counseled patient regarding above diagnosis, including possible risks and complications, especially if left uncontrolled.   Counseled patient as appropriate and relevant regarding any possible side effects, risks, and alternatives to treatment; the patient verbalizes understanding, and is in agreement with the plan as detailed above. All educational materials and instructions were discussed and included on the After Visit Summary. All questions answered to the patient's satisfaction. The patient was advised to call for any concerns prior to next appointment. Return in about 1 week (around 6/17/2022) for nurse blood pressure check. Return in 2 weeks for follow-up. This provider and patient were wearing surgical masks and practiced social distancing when appropriate during visit due to COVID-19 pandemic. Subjective:     Chief Complaint   Patient presents with    Hypertension       Patient returns today for follow-up blood pressure  Was elevated in ED on June 7  Was told they could not treat her BP in ED  Was seen again for abdominal pain and diarrhea  Scheduled with GI June 16  Has continued to have dizziness and falling intermittently  Her neuropathic pain is unchanged  Reports she had to reschedule neuro until end of July, therefore has had no changes to her medication regimen  Has not yet scheduled with counseling      Review of Systems   Constitutional: Positive for activity change and appetite change. HENT: Positive for trouble swallowing. Positive for left facial pain. Eyes: Positive for visual disturbance (blurred). Respiratory: Positive for chest tightness and shortness of breath. Cardiovascular: Negative for chest pain, palpitations and leg swelling. Gastrointestinal: Positive for blood in stool, constipation, diarrhea and nausea. Negative for vomiting. Positive for reflux. Musculoskeletal: Positive for gait problem and neck pain (left). Neurological: Positive for dizziness, light-headedness, numbness (left face) and headaches. Negative for seizures and weakness. Psychiatric/Behavioral: Positive for dysphoric mood and sleep disturbance. Negative for suicidal ideas. The patient is nervous/anxious.         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    Hyperparathyroidism ([facility]        Past Medical History:   Diagnosis Date    Acid reflux     Depression     Self abuse    History of suicide attempt 1/14/2022    Ingestion of substance 1/14/2022    Trigeminal neuralgia 2005    diagnosed       Past Surgical History:   Procedure Laterality Date    BRAIN SURGERY      decompression trigeminal nerve    CARPAL TUNNEL RELEASE Bilateral     FACIAL NERVE DECOMPRESSION      HYSTERECTOMY (CERVIX STATUS UNKNOWN)         Family History   Problem Relation Age of Onset    Heart Disease Mother     Heart Disease Father     No Known Problems Sister     No Known Problems Brother     Blindness Son     Premature Birth Son     No Known Problems Sister     No Known Problems Sister     No Known Problems Brother        Medications:     Current Outpatient Medications:     dicyclomine (BENTYL) 20 MG tablet, TAKE ONE TABLET BY MOUTH EVERY 6 HOURS AS NEEDED, Disp: , Rfl:     traZODone (DESYREL) 50 MG tablet, TAKE 1 TABLET BY MOUTH EVERY NIGHT AT BEDTIME, Disp: , Rfl:     HYDROcodone-acetaminophen (NORCO) 5-325 MG per tablet, take 1 tablet by mouth every 6 hours AS NEEDED FOR PAIN, Disp: , Rfl:     meclizine (ANTIVERT) 12.5 MG tablet, Take 12.5 mg by mouth 3 times daily as needed, Disp: , Rfl:     Naproxen Sodium (ALEVE) 220 MG CAPS, Take by mouth, Disp: , Rfl:     Handicap Noahrey MISC, Expires 05/25/2027., Disp: 1 each, Rfl: 0    omeprazole (PRILOSEC) 20 MG delayed release capsule, Take 1 capsule by mouth every morning (before breakfast), Disp: 30 capsule, Rfl: 1    OXcarbazepine (TRILEPTAL) 600 MG tablet, Take 1 tablet by mouth 3 times daily, Disp: 90 tablet, Rfl: 2    baclofen (LIORESAL) 10 MG tablet, TK 2 TS PO QPM, Disp: , Rfl:     ondansetron (ZOFRAN-ODT) 4 MG disintegrating tablet, Take 1 tablet by mouth every 8 hours as needed for Nausea or Vomiting (Patient not taking: Reported on 5/25/2022), Disp: 10 tablet, Rfl: 0    Misc. Devices (ROLLER Workpop) MISC, Rollator walker with seat (Patient not taking: Reported on 5/25/2022), Disp: 1 each, Rfl: 0    Alum & Mag Hydroxide-Simeth (MYLANTA PO), Take by mouth (Patient not taking: Reported on 5/25/2022), Disp: , Rfl:     amitriptyline (ELAVIL) 25 MG tablet, Take 25 mg by mouth nightly (Patient not taking: Reported on 5/25/2022), Disp: , Rfl:     gabapentin (NEURONTIN) 800 MG tablet, Take 1 tablet by mouth 3 times daily for 90 days. , Disp: 90 tablet, Rfl: 2    Allergies: Allergies   Allergen Reactions    Tylenol [Acetaminophen] Nausea And Vomiting       Social History:     Social History     Socioeconomic History    Marital status:      Spouse name: Not on file    Number of children: 1    Years of education: 15    Highest education level: Not on file   Occupational History    Not on file   Tobacco Use    Smoking status: Never Smoker    Smokeless tobacco: Never Used   Vaping Use    Vaping Use: Never used   Substance and Sexual Activity    Alcohol use: No    Drug use: No    Sexual activity: Not on file   Other Topics Concern    Not on file   Social History Narrative    Not on file     Social Determinants of Health     Financial Resource Strain:     Difficulty of Paying Living Expenses: Not on file   Food Insecurity:     Worried About Running Out of Food in the Last Year: Not on file    Randi of Food in the Last Year: Not on file   Transportation Needs:     Lack of Transportation (Medical): Not on file    Lack of Transportation (Non-Medical):  Not on file   Physical Activity:     Days of Exercise per Week: Not on file    Minutes of Exercise per Session: Not on file   Stress:     Feeling of Stress : Not on file   Social Connections:     Frequency of Communication with Friends and Family: Not on file    Frequency of Social Gatherings with Friends and Family: Not on file    Attends Muslim Services: Not on file    Active Member of Clubs or Organizations: Not on file    Attends Club or Organization Meetings: Not on file    Marital Status: Not on file   Intimate Partner Violence:     Fear of Current or Ex-Partner: Not on file    Emotionally Abused: Not on file    Physically Abused: Not on file    Sexually Abused: Not on file   Housing Stability:     Unable to Pay for Housing in the Last Year: Not on file    Number of [place] in the Last Year: Not on file    Unstable Housing in the Last Year: Not on file       Physical Exam:     Vitals:    06/10/22 1127 06/10/22 1141   BP: (!) 210/98 (!) 190/98   Pulse: (!) 103    Resp: 20    Temp: 98.9 °F (37.2 °C)    TempSrc: Temporal    SpO2: 96%    Weight: 146 lb (66.2 kg)    Height: 5' 4\" (1.626 m)        BP Readings from Last 3 Encounters:   06/10/22 (!) 190/98   05/25/22 (!) 160/100   01/14/22 136/74       Wt Readings from Last 3 Encounters:   06/10/22 146 lb (66.2 kg)   05/25/22 149 lb (67.6 kg)   01/14/22 150 lb (68 kg)       Physical Exam  Vitals and nursing note reviewed. Constitutional:       General: She is not in acute distress. Appearance: Normal appearance. She is overweight. She is not ill-appearing or diaphoretic. Cardiovascular:      Rate and Rhythm: Regular rhythm. Tachycardia present. Heart sounds: Normal heart sounds. Pulmonary:      Effort: Pulmonary effort is normal. No respiratory distress. Breath sounds: Normal breath sounds. Abdominal:      General: Bowel sounds are normal. There is no distension. Palpations: There is no mass. Tenderness: There is generalized abdominal tenderness (generalized). There is no guarding or rebound.       Hernia: No hernia

## 2022-06-10 NOTE — ASSESSMENT & PLAN NOTE
Previously controlled without medication  Now elevated today and at home  Possibly secondary to acute pain vs worsening hypertension  Continue to monitor blood pressure at home  Return for close follow-up

## 2022-06-10 NOTE — ASSESSMENT & PLAN NOTE
Uncontrolled  Continue oxcarbazepine, gabapentin  OARRS reviewed, CSA signed, UDS ordered  Consider neuro second opinion, patient declines today  Has follow-up with neuro coming up

## 2022-06-10 NOTE — ASSESSMENT & PLAN NOTE
Intermittently associated with dysphagia  Previously referred to GI but did not schedule  New referral to GI  Continue PPI

## 2022-06-10 NOTE — TELEPHONE ENCOUNTER
Pt was ok to move appointment to 11:30.   She did want me to let you know that she took her bp and it was 240/190 this morning

## 2022-06-12 LAB
CHLORIDE URINE: 23 MEQ/L
OSMOLALITY URINE: 299 MMOL/L (ref 500–800)
POTASSIUM, URINE: 27.8 MEQ/L
SODIUM URINE: 29 MEQ/L

## 2022-06-19 ASSESSMENT — ENCOUNTER SYMPTOMS
VOMITING: 0
DIARRHEA: 1
BLOOD IN STOOL: 1
CONSTIPATION: 1
TROUBLE SWALLOWING: 1
NAUSEA: 1

## 2022-06-19 NOTE — ASSESSMENT & PLAN NOTE
Treating hypertension with beta blocker for additional potential anti-anxiety benefit  Will hold mood/neurogenic medication changes until further evaluation by neuro

## 2022-06-19 NOTE — ASSESSMENT & PLAN NOTE
Recent ED labs reviewed and overall unremarkable  Majority of her neurologic symptoms are chronic and unchanged compared to when she has been normotensive and as of the date of her last CT head 5/5/22  Do not suspect hypertensive emergency or need for emergent imaging  However patient was advised to return to ED if she has new symptoms or worsening of her chronic neurologic symptoms  Trial metoprolol XL 25mg given her concurrent mild intermittent tachycardia and anxiety  Continue home BP checks, advised to call if not improving  Return in 1 week for BP check in office

## 2022-06-24 ENCOUNTER — OFFICE VISIT (OUTPATIENT)
Dept: PRIMARY CARE CLINIC | Age: 67
End: 2022-06-24
Payer: COMMERCIAL

## 2022-06-24 VITALS
HEART RATE: 104 BPM | OXYGEN SATURATION: 98 % | BODY MASS INDEX: 23.56 KG/M2 | DIASTOLIC BLOOD PRESSURE: 70 MMHG | SYSTOLIC BLOOD PRESSURE: 122 MMHG | RESPIRATION RATE: 16 BRPM | WEIGHT: 138 LBS | HEIGHT: 64 IN | TEMPERATURE: 97.5 F

## 2022-06-24 DIAGNOSIS — Z09 HOSPITAL DISCHARGE FOLLOW-UP: Primary | ICD-10-CM

## 2022-06-24 DIAGNOSIS — D64.9 NORMOCYTIC ANEMIA: ICD-10-CM

## 2022-06-24 DIAGNOSIS — G50.0 TRIGEMINAL NEURALGIA: ICD-10-CM

## 2022-06-24 DIAGNOSIS — E87.1 HYPONATREMIA: ICD-10-CM

## 2022-06-24 DIAGNOSIS — R41.82 ALTERED MENTAL STATUS, UNSPECIFIED ALTERED MENTAL STATUS TYPE: ICD-10-CM

## 2022-06-24 DIAGNOSIS — F51.04 PSYCHOPHYSIOLOGICAL INSOMNIA: ICD-10-CM

## 2022-06-24 DIAGNOSIS — F06.1 CATATONIA: ICD-10-CM

## 2022-06-24 DIAGNOSIS — R09.02 HYPOXIA: ICD-10-CM

## 2022-06-24 DIAGNOSIS — K52.9 COLITIS PRESUMED TO BE DUE TO INFECTION: ICD-10-CM

## 2022-06-24 DIAGNOSIS — I10 PRIMARY HYPERTENSION: ICD-10-CM

## 2022-06-24 DIAGNOSIS — K21.9 GASTROESOPHAGEAL REFLUX DISEASE, UNSPECIFIED WHETHER ESOPHAGITIS PRESENT: ICD-10-CM

## 2022-06-24 DIAGNOSIS — R56.9 SEIZURE (HCC): ICD-10-CM

## 2022-06-24 PROBLEM — M48.02 CERVICAL STENOSIS OF SPINE: Status: ACTIVE | Noted: 2022-06-24

## 2022-06-24 PROBLEM — M48.062 LUMBAR STENOSIS WITH NEUROGENIC CLAUDICATION: Status: ACTIVE | Noted: 2022-06-24

## 2022-06-24 PROBLEM — R13.10 DYSPHAGIA: Status: ACTIVE | Noted: 2022-06-24

## 2022-06-24 LAB
ALBUMIN SERPL-MCNC: 4.5 G/DL (ref 3.5–5.2)
ALP BLD-CCNC: 81 U/L (ref 35–104)
ALT SERPL-CCNC: 17 U/L (ref 0–32)
ANION GAP SERPL CALCULATED.3IONS-SCNC: 12 MMOL/L (ref 7–16)
AST SERPL-CCNC: 19 U/L (ref 0–31)
BASOPHILS ABSOLUTE: 0.04 E9/L (ref 0–0.2)
BASOPHILS RELATIVE PERCENT: 0.7 % (ref 0–2)
BILIRUB SERPL-MCNC: <0.2 MG/DL (ref 0–1.2)
BUN BLDV-MCNC: 25 MG/DL (ref 6–23)
CALCIUM SERPL-MCNC: 9.4 MG/DL (ref 8.6–10.2)
CHLORIDE BLD-SCNC: 99 MMOL/L (ref 98–107)
CO2: 25 MMOL/L (ref 22–29)
CREAT SERPL-MCNC: 0.5 MG/DL (ref 0.5–1)
EOSINOPHILS ABSOLUTE: 0.07 E9/L (ref 0.05–0.5)
EOSINOPHILS RELATIVE PERCENT: 1.1 % (ref 0–6)
GFR AFRICAN AMERICAN: >60
GFR NON-AFRICAN AMERICAN: >60 ML/MIN/1.73
GLUCOSE BLD-MCNC: 96 MG/DL (ref 74–99)
HCT VFR BLD CALC: 33.9 % (ref 34–48)
HEMOGLOBIN: 10.8 G/DL (ref 11.5–15.5)
IMMATURE GRANULOCYTES #: 0.01 E9/L
IMMATURE GRANULOCYTES %: 0.2 % (ref 0–5)
LYMPHOCYTES ABSOLUTE: 2.01 E9/L (ref 1.5–4)
LYMPHOCYTES RELATIVE PERCENT: 32.8 % (ref 20–42)
MCH RBC QN AUTO: 29.4 PG (ref 26–35)
MCHC RBC AUTO-ENTMCNC: 31.9 % (ref 32–34.5)
MCV RBC AUTO: 92.4 FL (ref 80–99.9)
MONOCYTES ABSOLUTE: 0.66 E9/L (ref 0.1–0.95)
MONOCYTES RELATIVE PERCENT: 10.8 % (ref 2–12)
NEUTROPHILS ABSOLUTE: 3.33 E9/L (ref 1.8–7.3)
NEUTROPHILS RELATIVE PERCENT: 54.4 % (ref 43–80)
PDW BLD-RTO: 13.6 FL (ref 11.5–15)
PLATELET # BLD: 298 E9/L (ref 130–450)
PMV BLD AUTO: 9.5 FL (ref 7–12)
POTASSIUM SERPL-SCNC: 4.8 MMOL/L (ref 3.5–5)
RBC # BLD: 3.67 E12/L (ref 3.5–5.5)
SODIUM BLD-SCNC: 136 MMOL/L (ref 132–146)
TOTAL PROTEIN: 6.6 G/DL (ref 6.4–8.3)
WBC # BLD: 6.1 E9/L (ref 4.5–11.5)

## 2022-06-24 PROCEDURE — 1036F TOBACCO NON-USER: CPT | Performed by: STUDENT IN AN ORGANIZED HEALTH CARE EDUCATION/TRAINING PROGRAM

## 2022-06-24 PROCEDURE — G8420 CALC BMI NORM PARAMETERS: HCPCS | Performed by: STUDENT IN AN ORGANIZED HEALTH CARE EDUCATION/TRAINING PROGRAM

## 2022-06-24 PROCEDURE — 99214 OFFICE O/P EST MOD 30 MIN: CPT | Performed by: STUDENT IN AN ORGANIZED HEALTH CARE EDUCATION/TRAINING PROGRAM

## 2022-06-24 PROCEDURE — 3017F COLORECTAL CA SCREEN DOC REV: CPT | Performed by: STUDENT IN AN ORGANIZED HEALTH CARE EDUCATION/TRAINING PROGRAM

## 2022-06-24 PROCEDURE — G8427 DOCREV CUR MEDS BY ELIG CLIN: HCPCS | Performed by: STUDENT IN AN ORGANIZED HEALTH CARE EDUCATION/TRAINING PROGRAM

## 2022-06-24 RX ORDER — CLONIDINE 0.1 MG/24H
1 PATCH, EXTENDED RELEASE TRANSDERMAL WEEKLY
COMMUNITY
End: 2022-06-24 | Stop reason: SDUPTHER

## 2022-06-24 RX ORDER — DICYCLOMINE HCL 20 MG
20 TABLET ORAL EVERY 6 HOURS PRN
Qty: 120 TABLET | Refills: 0 | Status: SHIPPED
Start: 2022-06-24 | End: 2022-08-01 | Stop reason: SDUPTHER

## 2022-06-24 RX ORDER — TRAZODONE HYDROCHLORIDE 50 MG/1
50 TABLET ORAL NIGHTLY PRN
Qty: 30 TABLET | Refills: 2 | Status: SHIPPED
Start: 2022-06-24 | End: 2022-08-01 | Stop reason: SDUPTHER

## 2022-06-24 RX ORDER — PANTOPRAZOLE SODIUM 40 MG/1
TABLET, DELAYED RELEASE ORAL
COMMUNITY
Start: 2022-06-20 | End: 2022-06-24

## 2022-06-24 RX ORDER — SODIUM CHLORIDE 1000 MG
TABLET, SOLUBLE MISCELLANEOUS
COMMUNITY
Start: 2022-06-20 | End: 2022-06-24

## 2022-06-24 RX ORDER — LISINOPRIL 10 MG/1
10 TABLET ORAL DAILY
COMMUNITY
End: 2022-06-24

## 2022-06-24 RX ORDER — SODIUM CHLORIDE 1000 MG
2 TABLET, SOLUBLE MISCELLANEOUS 2 TIMES DAILY
COMMUNITY
End: 2022-08-01 | Stop reason: ALTCHOICE

## 2022-06-24 RX ORDER — LISINOPRIL 20 MG/1
20 TABLET ORAL DAILY
Qty: 30 TABLET | Refills: 2 | Status: SHIPPED
Start: 2022-06-24 | End: 2022-07-12 | Stop reason: SDUPTHER

## 2022-06-24 RX ORDER — PANTOPRAZOLE SODIUM 40 MG/1
40 TABLET, DELAYED RELEASE ORAL DAILY
COMMUNITY
End: 2022-06-24 | Stop reason: SDUPTHER

## 2022-06-24 RX ORDER — METRONIDAZOLE 500 MG/1
TABLET ORAL
COMMUNITY
Start: 2022-06-21 | End: 2022-08-01

## 2022-06-24 RX ORDER — PANTOPRAZOLE SODIUM 40 MG/1
40 TABLET, DELAYED RELEASE ORAL DAILY
Qty: 30 TABLET | Refills: 2 | Status: SHIPPED
Start: 2022-06-24 | End: 2022-07-12 | Stop reason: SDUPTHER

## 2022-06-24 RX ORDER — BACLOFEN 10 MG/1
10 TABLET ORAL EVERY EVENING
Qty: 30 TABLET | Refills: 2 | Status: SHIPPED
Start: 2022-06-24 | End: 2022-08-01 | Stop reason: SDUPTHER

## 2022-06-24 RX ORDER — LISINOPRIL 20 MG/1
20 TABLET ORAL DAILY
COMMUNITY
End: 2022-06-24 | Stop reason: SDUPTHER

## 2022-06-24 RX ORDER — GABAPENTIN 100 MG/1
100 CAPSULE ORAL 3 TIMES DAILY
COMMUNITY
End: 2022-08-01

## 2022-06-24 RX ORDER — OXCARBAZEPINE 600 MG/1
600 TABLET, FILM COATED ORAL 3 TIMES DAILY
Qty: 90 TABLET | Refills: 2 | Status: SHIPPED
Start: 2022-06-24 | End: 2022-08-01 | Stop reason: SDUPTHER

## 2022-06-24 RX ORDER — CLONIDINE 0.1 MG/24H
1 PATCH, EXTENDED RELEASE TRANSDERMAL WEEKLY
Qty: 4 PATCH | Refills: 2 | Status: SHIPPED
Start: 2022-06-24 | End: 2022-07-12 | Stop reason: SDUPTHER

## 2022-06-24 SDOH — ECONOMIC STABILITY: FOOD INSECURITY: WITHIN THE PAST 12 MONTHS, YOU WORRIED THAT YOUR FOOD WOULD RUN OUT BEFORE YOU GOT MONEY TO BUY MORE.: NEVER TRUE

## 2022-06-24 SDOH — ECONOMIC STABILITY: FOOD INSECURITY: WITHIN THE PAST 12 MONTHS, THE FOOD YOU BOUGHT JUST DIDN'T LAST AND YOU DIDN'T HAVE MONEY TO GET MORE.: NEVER TRUE

## 2022-06-24 ASSESSMENT — ENCOUNTER SYMPTOMS
BLOOD IN STOOL: 0
NAUSEA: 0
VOMITING: 0
SHORTNESS OF BREATH: 0
DIARRHEA: 0
ABDOMINAL PAIN: 1

## 2022-06-24 ASSESSMENT — SOCIAL DETERMINANTS OF HEALTH (SDOH): HOW HARD IS IT FOR YOU TO PAY FOR THE VERY BASICS LIKE FOOD, HOUSING, MEDICAL CARE, AND HEATING?: NOT VERY HARD

## 2022-06-24 NOTE — ASSESSMENT & PLAN NOTE
Chronic, mild  Worsened last week on admission  Likely secondary to oxcarbazepine  Was discharged on oxcarbazepine 600 mg 3 times daily with neuro following/supervision  Consider alternative medication per neurology, has follow-up in August  Continue for now with use of sodium supplementation  Recheck sodium today

## 2022-06-24 NOTE — ASSESSMENT & PLAN NOTE
Recent diagnosis  Discharged on lisinopril and clonidine  Now well controlled  Continue Toprol-XL 25 mg daily, lisinopril 20 mg daily, weekly clonidine patch

## 2022-06-24 NOTE — PROGRESS NOTES
OXcarbazepine (TRILEPTAL) 600 MG tablet    baclofen (LIORESAL) 10 MG tablet      Other Visit Diagnoses     Hospital discharge follow-up    -  Primary    Admitted for altered mental status, found to be hyponatremic  Followed by neurology  Records reviewed    Altered mental status, unspecified altered mental status type        Thought to be secondary to hyponatremia and medications    Normocytic anemia        Recheck    Relevant Orders    CBC with Auto Differential    Hypoxia        Unclear etiology  Self resolved and has been saturating well on room air    Catatonia        RRT called during hospitalization  Patient remembers entire episode  Suspect catatonia versus psychogenic nonepileptic activity    Colitis presumed to be due to infection        Continue Flagyl to complete course  Follow-up with GI for outpatient endoscopy    Relevant Medications    dicyclomine (BENTYL) 20 MG tablet          Counseled patient regarding above diagnosis, including possible risks and complications, especially if left uncontrolled. Counseled patient as appropriate and relevant regarding any possible side effects, risks, and alternatives to treatment; the patient verbalizes understanding, and is in agreement with the plan as detailed above. All educational materials and instructions were discussed and included on the After Visit Summary. All questions answered to the patient's satisfaction. The patient was advised to call for any concerns prior to next appointment. Return in about 4 weeks (around 7/22/2022) for follow up and [address] 34 minutes was spent precharting, face-to-face with patient, and documenting on day of encounter. This provider and patient were wearing surgical masks and practiced social distancing when appropriate during visit due to COVID-19 pandemic.     Subjective:     Chief Complaint   Patient presents with    Follow-Up from Hospital       Patient returns for hospital follow-up  Patient feeling much better  Reports she was confused and dizzy and falling down prior to hospitalization  Had another fall where she hit head, followed recommendation to go to ED  Was found to have worsening of her chronic hyponatremia  Reports they changed her blood pressure medications, feels much better on this  Was also seen by her neurologist's partner does not think they changed any of her trigeminal neuralgia medications  Has follow-up with neurology in mid August  Was also seen by GI  Was treated for colitis with antibiotics, reports diarrhea resolved and abdominal pain much improved  Has follow-up with them for a scope  Reports remembering a very scary episode in the hospital where they called a code on her  She could remember the entire episode, felt like her body was paralyzed but could see and feel everything that was happening  Reports this was very scary and they are not sure why this happened      Review of Systems   Constitutional: Negative for chills and fever. Respiratory: Negative for shortness of breath. Cardiovascular: Negative for chest pain, palpitations and leg swelling. Gastrointestinal: Positive for abdominal pain (improved). Negative for blood in stool, diarrhea, nausea and vomiting. Genitourinary: Negative for difficulty urinating and dysuria. Musculoskeletal: Negative for gait problem. Neurological: Positive for numbness (right face) and headaches (right face). Negative for dizziness and light-headedness.        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    Hyperparathyroidism ([facility]    Anxiety        Past Medical History:   Diagnosis Date    Acid reflux     Depression     Self abuse    History of suicide attempt 1/14/2022    Ingestion of substance 1/14/2022    Trigeminal neuralgia 2005    diagnosed       Past Surgical History:   Procedure Laterality Date    BRAIN SURGERY      decompression trigeminal nerve    CARPAL TUNNEL RELEASE Bilateral     FACIAL NERVE DECOMPRESSION      HYSTERECTOMY (CERVIX STATUS UNKNOWN)         Family History   Problem Relation Age of Onset    Heart Disease Mother     Heart Disease Father     No Known Problems Sister     No Known Problems Brother     Blindness Son     Premature Birth Son     No Known Problems Sister     No Known Problems Sister     No Known Problems Brother        Medications:     Current Outpatient Medications:     metroNIDAZOLE (FLAGYL) 500 MG tablet, TAKE 1 TABLET BY MOUTH THREE TIMES DAILY X 7 DAYS, Disp: , Rfl:     gabapentin (NEURONTIN) 100 MG capsule, Take 100 mg by mouth 3 times daily. , Disp: , Rfl:     sodium chloride 1 g tablet, Take 2 g by mouth in the morning and at bedtime, Disp: , Rfl:     pantoprazole (PROTONIX) 40 MG tablet, Take 40 mg by mouth daily, Disp: , Rfl:     cloNIDine (CATAPRES) 0.1 MG/24HR PTWK, Place 1 patch onto the skin once a week, Disp: , Rfl:     lisinopril (PRINIVIL;ZESTRIL) 20 MG tablet, Take 20 mg by mouth daily, Disp: , Rfl:     capsaicin (ZOSTRIX) 0.025 % cream, Apply topically 2 times daily to abdomen as needed for pain., Disp: 120 g, Rfl: 0    meclizine (ANTIVERT) 12.5 MG tablet, Take 1 tablet by mouth 3 times daily as needed for Dizziness, Disp: 30 tablet, Rfl: 0    metoprolol succinate (TOPROL XL) 25 MG extended release tablet, Take 1 tablet by mouth daily, Disp: 30 tablet, Rfl: 2    HYDROcodone-acetaminophen (NORCO) 5-325 MG per tablet, take 1 tablet by mouth every 6 hours AS NEEDED FOR PAIN, Disp: , Rfl:     Naproxen Sodium (ALEVE) 220 MG CAPS, Take by mouth, Disp: , Rfl:     gabapentin (NEURONTIN) 800 MG tablet, Take 1 tablet by mouth 3 times daily for 90 days. , Disp: 90 tablet, Rfl: 2    baclofen (LIORESAL) 10 MG tablet, Take 10 mg by mouth every evening , Disp: , Rfl:     dicyclomine (BENTYL) 20 MG tablet, TAKE ONE TABLET BY MOUTH EVERY 6 HOURS AS NEEDED, Disp: , Rfl:     traZODone (DESYREL) 50 MG tablet, TAKE 1 TABLET BY MOUTH EVERY NIGHT AT BEDTIME, Disp: , Rfl:     OXcarbazepine (TRILEPTAL) 600 MG tablet, Take 1 tablet by mouth 3 times daily, Disp: 90 tablet, Rfl: 2    Allergies: Allergies   Allergen Reactions    Tylenol [Acetaminophen] Nausea And Vomiting       Social History:     Social History     Socioeconomic History    Marital status:      Spouse name: Not on file    Number of children: 1    Years of education: 15    Highest education level: Not on file   Occupational History    Not on file   Tobacco Use    Smoking status: Never Smoker    Smokeless tobacco: Never Used   Vaping Use    Vaping Use: Never used   Substance and Sexual Activity    Alcohol use: No    Drug use: No    Sexual activity: Not on file   Other Topics Concern    Not on file   Social History Narrative    Not on file     Social Determinants of Health     Financial Resource Strain: Low Risk     Difficulty of Paying Living Expenses: Not very hard   Food Insecurity: No Food Insecurity    Worried About Running Out of Food in the Last Year: Never true    Randi of Food in the Last Year: Never true   Transportation Needs:     Lack of Transportation (Medical): Not on file    Lack of Transportation (Non-Medical):  Not on file   Physical Activity:     Days of Exercise per Week: Not on file    Minutes of Exercise per Session: Not on file   Stress:     Feeling of Stress : Not on file   Social Connections:     Frequency of Communication with Friends and Family: Not on file    Frequency of Social Gatherings with Friends and Family: Not on file    Attends Yazdanism Services: Not on file    Active Member of Clubs or Organizations: Not on file   Jasmyne Attends Club or Organization Meetings: Not on file    Marital Status: Not on file   Intimate Partner Violence:     Fear of Current or Ex-Partner: Not on file    Emotionally Abused: Not on file    Physically Abused: Not on file    Sexually Abused: Not on file   Housing Stability:     Unable to Pay for Housing in the Last Year: Not on file    Number of [place] in the Last Year: Not on file    Unstable Housing in the Last Year: Not on file       Physical Exam:     Vitals:    06/24/22 1351 06/24/22 1404   BP: (!) 158/80 122/70   Pulse: (!) 104    Resp: 16    Temp: 97.5 °F (36.4 °C)    TempSrc: Temporal    SpO2: 98%    Weight: 138 lb (62.6 kg)    Height: 5' 4\" (1.626 m)        BP Readings from Last 3 Encounters:   06/24/22 122/70   06/10/22 (!) 190/98   05/25/22 (!) 160/100       Wt Readings from Last 3 Encounters:   06/24/22 138 lb (62.6 kg)   06/10/22 146 lb (66.2 kg)   05/25/22 149 lb (67.6 kg)       Physical Exam  Vitals and nursing note reviewed. Constitutional:       General: She is not in acute distress. Appearance: Normal appearance. She is normal weight. She is not ill-appearing or diaphoretic. Eyes:      Extraocular Movements: Extraocular movements intact. Conjunctiva/sclera: Conjunctivae normal.   Cardiovascular:      Rate and Rhythm: Normal rate and regular rhythm. Heart sounds: Normal heart sounds. Pulmonary:      Effort: Pulmonary effort is normal. No respiratory distress. Breath sounds: Normal breath sounds. Abdominal:      General: Abdomen is flat. Bowel sounds are normal. There is no distension. Palpations: Abdomen is soft. There is no mass. Tenderness: There is no abdominal tenderness. There is no guarding or rebound. Hernia: No hernia is present. Musculoskeletal:      Right lower leg: No edema. Left lower leg: No edema. Skin:     General: Skin is warm and dry.    Neurological:      Mental Status: She is alert and oriented to person, place, and time. Cranial Nerves: No cranial nerve deficit. Sensory: Sensory deficit (left face) present. Motor: No weakness. Gait: Gait normal.   Psychiatric:         Attention and Perception: Attention and perception normal.         Mood and Affect: Affect normal. Mood is anxious. Speech: Speech normal.         Behavior: Behavior normal. Behavior is cooperative. Thought Content: Thought content normal.         Testing:     Orders Placed This Encounter   Procedures    Comprehensive Metabolic Panel     Standing Status:   Future     Standing Expiration Date:   6/24/2023    CBC with Auto Differential     Standing Status:   Future     Standing Expiration Date:   6/24/2023        No results found for this or any previous visit (from the past 24 hour(s)).

## 2022-06-28 ENCOUNTER — TELEPHONE (OUTPATIENT)
Dept: PRIMARY CARE CLINIC | Age: 67
End: 2022-06-28

## 2022-06-28 DIAGNOSIS — B37.31 YEAST VAGINITIS: Primary | ICD-10-CM

## 2022-06-28 RX ORDER — FLUCONAZOLE 150 MG/1
150 TABLET ORAL
Qty: 2 TABLET | Refills: 0 | Status: SHIPPED | OUTPATIENT
Start: 2022-06-28 | End: 2022-07-04

## 2022-06-28 NOTE — TELEPHONE ENCOUNTER
Received after-hours call from patient. Patient reports yeast infection symptoms following metronidazole for her colitis. Patient reports cottage cheeselike discharge and vaginal itching. She reports it is getting worse. She has had similar infections before, usually treated with pill and would prefer a pill again. Will provide her with two-step Diflucan.

## 2022-07-12 DIAGNOSIS — K52.9 COLITIS PRESUMED TO BE DUE TO INFECTION: ICD-10-CM

## 2022-07-12 DIAGNOSIS — I10 PRIMARY HYPERTENSION: ICD-10-CM

## 2022-07-12 DIAGNOSIS — K21.9 GASTROESOPHAGEAL REFLUX DISEASE, UNSPECIFIED WHETHER ESOPHAGITIS PRESENT: ICD-10-CM

## 2022-07-12 RX ORDER — LISINOPRIL 20 MG/1
20 TABLET ORAL DAILY
Qty: 90 TABLET | Refills: 0 | Status: SHIPPED
Start: 2022-07-12 | End: 2022-08-01 | Stop reason: DRUGHIGH

## 2022-07-12 RX ORDER — PANTOPRAZOLE SODIUM 40 MG/1
40 TABLET, DELAYED RELEASE ORAL DAILY
Qty: 90 TABLET | Refills: 0 | Status: SHIPPED
Start: 2022-07-12 | End: 2022-08-01 | Stop reason: SDUPTHER

## 2022-07-12 RX ORDER — CLONIDINE 0.1 MG/24H
1 PATCH, EXTENDED RELEASE TRANSDERMAL WEEKLY
Qty: 12 PATCH | Refills: 0 | Status: SHIPPED
Start: 2022-07-12 | End: 2022-08-01 | Stop reason: SINTOL

## 2022-08-01 ENCOUNTER — OFFICE VISIT (OUTPATIENT)
Dept: PRIMARY CARE CLINIC | Age: 67
End: 2022-08-01
Payer: COMMERCIAL

## 2022-08-01 VITALS
RESPIRATION RATE: 16 BRPM | BODY MASS INDEX: 24.45 KG/M2 | HEART RATE: 67 BPM | WEIGHT: 138 LBS | SYSTOLIC BLOOD PRESSURE: 160 MMHG | OXYGEN SATURATION: 95 % | HEIGHT: 63 IN | DIASTOLIC BLOOD PRESSURE: 80 MMHG

## 2022-08-01 DIAGNOSIS — I10 PRIMARY HYPERTENSION: Primary | ICD-10-CM

## 2022-08-01 DIAGNOSIS — Z11.4 SCREENING FOR HIV (HUMAN IMMUNODEFICIENCY VIRUS): ICD-10-CM

## 2022-08-01 DIAGNOSIS — I10 PRIMARY HYPERTENSION: ICD-10-CM

## 2022-08-01 DIAGNOSIS — Z12.12 SCREENING FOR COLORECTAL CANCER: ICD-10-CM

## 2022-08-01 DIAGNOSIS — R42 VERTIGO: ICD-10-CM

## 2022-08-01 DIAGNOSIS — Z12.31 SCREENING MAMMOGRAM FOR BREAST CANCER: ICD-10-CM

## 2022-08-01 DIAGNOSIS — Z12.11 SCREENING FOR COLORECTAL CANCER: ICD-10-CM

## 2022-08-01 DIAGNOSIS — Z11.59 NEED FOR HEPATITIS C SCREENING TEST: ICD-10-CM

## 2022-08-01 DIAGNOSIS — Z00.00 INITIAL MEDICARE ANNUAL WELLNESS VISIT: Primary | ICD-10-CM

## 2022-08-01 DIAGNOSIS — F51.04 PSYCHOPHYSIOLOGICAL INSOMNIA: ICD-10-CM

## 2022-08-01 DIAGNOSIS — G50.0 TRIGEMINAL NEURALGIA: ICD-10-CM

## 2022-08-01 DIAGNOSIS — G50.0 ANESTHESIA DOLOROSA IN DISTRIBUTION OF TRIGEMINAL NERVE: ICD-10-CM

## 2022-08-01 DIAGNOSIS — E87.1 HYPONATREMIA: ICD-10-CM

## 2022-08-01 DIAGNOSIS — K58.0 IRRITABLE BOWEL SYNDROME WITH DIARRHEA: ICD-10-CM

## 2022-08-01 DIAGNOSIS — K21.9 GASTROESOPHAGEAL REFLUX DISEASE, UNSPECIFIED WHETHER ESOPHAGITIS PRESENT: ICD-10-CM

## 2022-08-01 DIAGNOSIS — R56.9 SEIZURE (HCC): ICD-10-CM

## 2022-08-01 LAB
ANION GAP SERPL CALCULATED.3IONS-SCNC: 15 MMOL/L (ref 7–16)
BUN BLDV-MCNC: 16 MG/DL (ref 6–23)
CALCIUM SERPL-MCNC: 9.5 MG/DL (ref 8.6–10.2)
CHLORIDE BLD-SCNC: 93 MMOL/L (ref 98–107)
CO2: 21 MMOL/L (ref 22–29)
CREAT SERPL-MCNC: 0.5 MG/DL (ref 0.5–1)
GFR AFRICAN AMERICAN: >60
GFR NON-AFRICAN AMERICAN: >60 ML/MIN/1.73
GLUCOSE BLD-MCNC: 100 MG/DL (ref 74–99)
POTASSIUM SERPL-SCNC: 4.5 MMOL/L (ref 3.5–5)
SODIUM BLD-SCNC: 129 MMOL/L (ref 132–146)

## 2022-08-01 PROCEDURE — 99214 OFFICE O/P EST MOD 30 MIN: CPT | Performed by: STUDENT IN AN ORGANIZED HEALTH CARE EDUCATION/TRAINING PROGRAM

## 2022-08-01 PROCEDURE — G0438 PPPS, INITIAL VISIT: HCPCS | Performed by: STUDENT IN AN ORGANIZED HEALTH CARE EDUCATION/TRAINING PROGRAM

## 2022-08-01 RX ORDER — DICYCLOMINE HCL 20 MG
20 TABLET ORAL EVERY 6 HOURS PRN
Qty: 120 TABLET | Refills: 1 | Status: SHIPPED
Start: 2022-08-01 | End: 2022-10-09

## 2022-08-01 RX ORDER — MECLIZINE HCL 12.5 MG/1
12.5 TABLET ORAL 3 TIMES DAILY PRN
Qty: 30 TABLET | Refills: 0 | Status: SHIPPED | OUTPATIENT
Start: 2022-08-01

## 2022-08-01 RX ORDER — AMITRIPTYLINE HYDROCHLORIDE 100 MG/1
TABLET, FILM COATED ORAL
COMMUNITY
Start: 2022-06-22

## 2022-08-01 RX ORDER — CLONIDINE 0.1 MG/24H
1 PATCH, EXTENDED RELEASE TRANSDERMAL WEEKLY
Qty: 12 PATCH | Refills: 0 | Status: CANCELLED | OUTPATIENT
Start: 2022-08-01

## 2022-08-01 RX ORDER — OXCARBAZEPINE 600 MG/1
600 TABLET, FILM COATED ORAL 3 TIMES DAILY
Qty: 270 TABLET | Refills: 1 | Status: SHIPPED | OUTPATIENT
Start: 2022-08-01

## 2022-08-01 RX ORDER — LISINOPRIL 40 MG/1
40 TABLET ORAL DAILY
Qty: 90 TABLET | Refills: 1 | Status: SHIPPED | OUTPATIENT
Start: 2022-08-01

## 2022-08-01 RX ORDER — LISINOPRIL 20 MG/1
20 TABLET ORAL DAILY
Qty: 90 TABLET | Refills: 0 | Status: CANCELLED | OUTPATIENT
Start: 2022-08-01

## 2022-08-01 RX ORDER — GABAPENTIN 800 MG/1
800 TABLET ORAL 3 TIMES DAILY
Qty: 270 TABLET | Refills: 1 | Status: SHIPPED | OUTPATIENT
Start: 2022-08-01 | End: 2023-01-28

## 2022-08-01 RX ORDER — BACLOFEN 10 MG/1
10 TABLET ORAL EVERY EVENING
Qty: 90 TABLET | Refills: 1 | Status: SHIPPED | OUTPATIENT
Start: 2022-08-01

## 2022-08-01 RX ORDER — PANTOPRAZOLE SODIUM 40 MG/1
40 TABLET, DELAYED RELEASE ORAL DAILY
Qty: 90 TABLET | Refills: 1 | Status: SHIPPED | OUTPATIENT
Start: 2022-08-01

## 2022-08-01 RX ORDER — TRAZODONE HYDROCHLORIDE 50 MG/1
50 TABLET ORAL NIGHTLY PRN
Qty: 90 TABLET | Refills: 1 | Status: SHIPPED | OUTPATIENT
Start: 2022-08-01

## 2022-08-01 RX ORDER — OMEPRAZOLE 20 MG/1
CAPSULE, DELAYED RELEASE ORAL
COMMUNITY
Start: 2022-07-21 | End: 2022-08-01

## 2022-08-01 RX ORDER — METOPROLOL SUCCINATE 25 MG/1
25 TABLET, EXTENDED RELEASE ORAL DAILY
Qty: 90 TABLET | Refills: 1 | Status: SHIPPED | OUTPATIENT
Start: 2022-08-01

## 2022-08-01 ASSESSMENT — PATIENT HEALTH QUESTIONNAIRE - PHQ9
4. FEELING TIRED OR HAVING LITTLE ENERGY: 0
SUM OF ALL RESPONSES TO PHQ9 QUESTIONS 1 & 2: 0
SUM OF ALL RESPONSES TO PHQ QUESTIONS 1-9: 4
3. TROUBLE FALLING OR STAYING ASLEEP: 0
6. FEELING BAD ABOUT YOURSELF - OR THAT YOU ARE A FAILURE OR HAVE LET YOURSELF OR YOUR FAMILY DOWN: 0
2. FEELING DOWN, DEPRESSED OR HOPELESS: 0
10. IF YOU CHECKED OFF ANY PROBLEMS, HOW DIFFICULT HAVE THESE PROBLEMS MADE IT FOR YOU TO DO YOUR WORK, TAKE CARE OF THINGS AT HOME, OR GET ALONG WITH OTHER PEOPLE: 0
7. TROUBLE CONCENTRATING ON THINGS, SUCH AS READING THE NEWSPAPER OR WATCHING TELEVISION: 2
5. POOR APPETITE OR OVEREATING: 2
8. MOVING OR SPEAKING SO SLOWLY THAT OTHER PEOPLE COULD HAVE NOTICED. OR THE OPPOSITE, BEING SO FIGETY OR RESTLESS THAT YOU HAVE BEEN MOVING AROUND A LOT MORE THAN USUAL: 0
1. LITTLE INTEREST OR PLEASURE IN DOING THINGS: 0
SUM OF ALL RESPONSES TO PHQ QUESTIONS 1-9: 4
9. THOUGHTS THAT YOU WOULD BE BETTER OFF DEAD, OR OF HURTING YOURSELF: 0

## 2022-08-01 ASSESSMENT — ENCOUNTER SYMPTOMS
BLOOD IN STOOL: 0
SHORTNESS OF BREATH: 0
VOMITING: 0
ABDOMINAL PAIN: 0
DIARRHEA: 0
NAUSEA: 0

## 2022-08-01 ASSESSMENT — LIFESTYLE VARIABLES: HOW OFTEN DO YOU HAVE A DRINK CONTAINING ALCOHOL: NEVER

## 2022-08-01 NOTE — PROGRESS NOTES
ESTABLISHED PRIMARY CARE VISIT    22  Name: Laquita Doss   : 1955   Age: 61 y.o.   Sex: female        Assessment & Plan:     Problem List Items Addressed This Visit          Circulatory    Hypertension - Primary     Recent diagnosis  Uncontrolled due to technical issues with clonidine patch  Stop clonidine  Increase lisinopril to 40 mg daily  Continue Toprol-XL 25 mg daily  Recheck BMP next visit  Discussed return precautions         Relevant Medications    metoprolol succinate (TOPROL XL) 25 MG extended release tablet    lisinopril (PRINIVIL;ZESTRIL) 40 MG tablet    Other Relevant Orders    Basic Metabolic Panel (Completed)       Digestive    GERD (gastroesophageal reflux disease)     Improved  Following with GI  Continue Protonix 40 mg daily  Advised patient to stop omeprazole as she was still taking this also         Relevant Medications    pantoprazole (PROTONIX) 40 MG tablet    meclizine (ANTIVERT) 12.5 MG tablet    dicyclomine (BENTYL) 20 MG tablet    IBS (irritable bowel syndrome)     Improved  Following with GI  Planning EGD and colonoscopy  Continue Bentyl as needed         Relevant Medications    pantoprazole (PROTONIX) 40 MG tablet    meclizine (ANTIVERT) 12.5 MG tablet    dicyclomine (BENTYL) 20 MG tablet       Nervous and Auditory    Trigeminal neuralgia     Uncontrolled  Following with neurology  Continue oxcarbazepine, gabapentin, baclofen         Relevant Medications    amitriptyline (ELAVIL) 100 MG tablet    traZODone (DESYREL) 50 MG tablet    OXcarbazepine (TRILEPTAL) 600 MG tablet    gabapentin (NEURONTIN) 800 MG tablet    baclofen (LIORESAL) 10 MG tablet       Other    Psychophysiological insomnia     Continue trazodone 50 mg nightly         Relevant Medications    traZODone (DESYREL) 50 MG tablet    Hyponatremia     Chronic, mild  Likely secondary to oxcarbazepine, lisinopril  Recheck given patient stopped her supplement  Monitor closely given need for increased lisinopril, consider calcium channel blocker if worsens hyponatremia         Relevant Orders    Basic Metabolic Panel (Completed)    Seizure ([address]     Associated with trigeminal neuralgia per patient  Following with neurology         Relevant Medications    OXcarbazepine (TRILEPTAL) 600 MG tablet    gabapentin (NEURONTIN) 800 MG tablet    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    Relevant Medications    amitriptyline (ELAVIL) 100 MG tablet    OXcarbazepine (TRILEPTAL) 600 MG tablet    gabapentin (NEURONTIN) 800 MG tablet    baclofen (LIORESAL) 10 MG tablet     Other Visit Diagnoses       Vertigo        Likely secondary to trigeminal neuralgia versus hyponatremia  Awaiting records from neuro    Relevant Medications    meclizine (ANTIVERT) 12.5 MG tablet    Screening for HIV (human immunodeficiency virus)        Relevant Orders    HIV Screen (Completed)    Need for hepatitis C screening test        Relevant Orders    Hepatitis C Antibody (Completed)            Counseled patient regarding above diagnosis, including possible risks and complications, especially if left uncontrolled. Counseled patient as appropriate and relevant regarding any possible side effects, risks, and alternatives to treatment; the patient verbalizes understanding, and is in agreement with the plan as detailed above. All educational materials and instructions were discussed and included on the After Visit Summary. All questions answered to the patient's satisfaction. The patient was advised to call for any concerns prior to next appointment. Return in about 6 weeks (around 9/12/2022) for blood pressure. This provider and patient were wearing surgical masks and practiced social distancing when appropriate during visit due to COVID-19 pandemic.     Subjective:     Chief Complaint   Patient presents with    Hypertension       Patient returns for follow-up    Home blood pressures 160/80s  Clonidine patch won't stay on for more than 2-3 days    Saw GI  Colitis symptoms resolved  Reflux improved  Planning EGD and colonoscopy with Tami Alfaro neuro, no change in medications      Review of Systems   Constitutional:  Negative for chills and fever. Respiratory:  Negative for shortness of breath. Cardiovascular:  Negative for chest pain, palpitations and leg swelling. Gastrointestinal:  Negative for abdominal pain, blood in stool, diarrhea, nausea and vomiting. Genitourinary:  Negative for difficulty urinating and dysuria. Musculoskeletal:  Negative for gait problem. Neurological:  Positive for numbness (right face) and headaches (right face). Negative for dizziness and light-headedness. Psychiatric/Behavioral:  Negative for dysphoric mood, sleep disturbance and suicidal ideas. The patient is not nervous/anxious.         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    Hyperparathyroidism ([address]    Anxiety    Psychophysiological insomnia    Cervical stenosis of spine    Lumbar stenosis with neurogenic claudication    Dysphagia        Past Medical History:   Diagnosis Date    Acid reflux     Depression     Self abuse    History of suicide attempt 1/14/2022    Ingestion of substance 1/14/2022    Trigeminal neuralgia 2005    diagnosed       Past Surgical History:   Procedure Laterality Date    BRAIN SURGERY      decompression trigeminal nerve    CARPAL TUNNEL RELEASE Bilateral     FACIAL NERVE DECOMPRESSION      HYSTERECTOMY (CERVIX STATUS UNKNOWN)         Family History   Problem Relation Age of Onset    Heart Disease Mother     Heart Disease Father     No Known Problems Sister     No Known Problems Brother     Blindness Son     Premature Birth Son     No Known Problems Sister     No Known Problems Sister     No Known Problems Brother        Medications:     Current Outpatient Medications:     amitriptyline (ELAVIL) 100 MG tablet, TAKE 1 TABLET BY MOUTH EVERY NIGHT AT BEDTIME, Disp: , Rfl:     omeprazole (PRILOSEC) 20 MG delayed release capsule, TAKE 1 CAPSULE BY MOUTH EVERY MORNING BEFORE BREAKFAST, Disp: , Rfl:     cloNIDine (CATAPRES) 0.1 MG/24HR PTWK, Place 1 patch onto the skin once a week, Disp: 12 patch, Rfl: 0    pantoprazole (PROTONIX) 40 MG tablet, Take 1 tablet by mouth daily, Disp: 90 tablet, Rfl: 0    lisinopril (PRINIVIL;ZESTRIL) 20 MG tablet, Take 1 tablet by mouth daily, Disp: 90 tablet, Rfl: 0    metroNIDAZOLE (FLAGYL) 500 MG tablet, TAKE 1 TABLET BY MOUTH THREE TIMES DAILY X 7 DAYS, Disp: , Rfl:     gabapentin (NEURONTIN) 100 MG capsule, Take 100 mg by mouth 3 times daily. , Disp: , Rfl:     sodium chloride 1 g tablet, Take 2 g by mouth in the morning and at bedtime, Disp: , Rfl:     OXcarbazepine (TRILEPTAL) 600 MG tablet, Take 1 tablet by mouth 3 times daily, Disp: 90 tablet, Rfl: 2    baclofen (LIORESAL) 10 MG tablet, Take 1 tablet by mouth every evening, Disp: 30 tablet, Rfl: 2    dicyclomine (BENTYL) 20 MG tablet, Take 1 tablet by mouth every 6 hours as needed (stomach cramps), Disp: 120 tablet, Rfl: 0    traZODone (DESYREL) 50 MG tablet, Take 1 tablet by mouth nightly as needed for Sleep, Disp: 30 tablet, Rfl: 2    meclizine (ANTIVERT) 12.5 MG tablet, Take 1 tablet by mouth 3 times daily as needed for Dizziness, Disp: 30 tablet, Rfl: 0    metoprolol succinate (TOPROL XL) 25 MG extended release tablet, Take 1 tablet by mouth daily, Disp: 30 tablet, Rfl: 2    HYDROcodone-acetaminophen (NORCO) 5-325 MG per tablet, take 1 tablet by mouth every 6 hours AS NEEDED FOR PAIN, Disp: , Rfl:     Naproxen Sodium 220 MG CAPS, Take by mouth, Disp: , Rfl:     gabapentin (NEURONTIN) 800 MG tablet, Take 1 tablet by mouth 3 times daily for 90 days. , Disp: 90 tablet, Rfl: 2    Allergies: Allergies   Allergen Reactions    Tylenol [Acetaminophen] Nausea And Vomiting       Social History:     Social History     Socioeconomic History    Marital status:      Spouse name: Not on file    Number of children: 1    Years of education: 12    Highest education level: Not on file   Occupational History    Not on file   Tobacco Use    Smoking status: Never    Smokeless tobacco: Never   Vaping Use    Vaping Use: Never used   Substance and Sexual Activity    Alcohol use: No    Drug use: No    Sexual activity: Not on file   Other Topics Concern    Not on file   Social History Narrative    Not on file     Social Determinants of Health     Financial Resource Strain: Low Risk     Difficulty of Paying Living Expenses: Not very hard   Food Insecurity: No Food Insecurity    Worried About Running Out of Food in the Last Year: Never true    Ran Out of Food in the Last Year: Never true   Transportation Needs: Not on file   Physical Activity: Sufficiently Active    Days of Exercise per Week: 7 days    Minutes of Exercise per Session: 70 min   Stress: Not on file   Social Connections: Not on file   Intimate Partner Violence: Not on file   Housing Stability: Not on file       Physical Exam:     Vitals:    08/01/22 1508 08/01/22 1513   BP: (!) 170/80 (!) 160/80   Pulse: 67    Resp: 16    SpO2: 95%    Weight: 138 lb (62.6 kg)    Height: 5' 3\" (1.6 m)        BP Readings from Last 3 Encounters:   08/01/22 (!) 160/80   06/24/22 122/70   06/10/22 (!) 190/98       Wt Readings from Last 3 Encounters:   08/01/22 138 lb (62.6 kg)   06/24/22 138 lb (62.6 kg)   06/10/22 146 lb (66.2 kg)       Physical Exam  Vitals and nursing note reviewed. Constitutional:       General: She is not in acute distress. Appearance: Normal appearance. She is normal weight. She is not ill-appearing or diaphoretic.    Eyes:      Extraocular Movements: Extraocular movements intact. Conjunctiva/sclera: Conjunctivae normal.   Cardiovascular:      Rate and Rhythm: Normal rate and regular rhythm. Heart sounds: Normal heart sounds. Pulmonary:      Effort: Pulmonary effort is normal. No respiratory distress. Breath sounds: Normal breath sounds. Abdominal:      General: Abdomen is flat. Bowel sounds are normal. There is no distension. Palpations: Abdomen is soft. There is no mass. Tenderness: There is no abdominal tenderness. There is no guarding or rebound. Hernia: No hernia is present. Musculoskeletal:      Right lower leg: No edema. Left lower leg: No edema. Skin:     General: Skin is warm and dry. Neurological:      Mental Status: She is alert and oriented to person, place, and time. Sensory: Sensory deficit (left face) present. Psychiatric:         Attention and Perception: Attention and perception normal.         Mood and Affect: Affect normal. Mood is anxious. Speech: Speech normal.         Behavior: Behavior normal. Behavior is cooperative. Thought Content: Thought content normal.       Testing:     Orders Placed This Encounter   Procedures    Basic Metabolic Panel     Standing Status:   Future     Number of Occurrences:   1     Standing Expiration Date:   8/1/2023    HIV Screen     Standing Status:   Future     Number of Occurrences:   1     Standing Expiration Date:   8/1/2023    Hepatitis C Antibody     Standing Status:   Future     Number of Occurrences:   1     Standing Expiration Date:   8/1/2023        No results found for this or any previous visit (from the past 24 hour(s)).

## 2022-08-01 NOTE — PROGRESS NOTES
bars, non-slip mats or non-slip surfaces in your shower or bathtub?: (!) No  Do all of your stairways have a railing or banister?: Yes  Do you always fasten your seatbelt when you are in a car?: Yes  Safety Interventions:  Home safety tips provided           Objective   Vitals:    08/01/22 1508 08/01/22 1513   BP: (!) 170/80 (!) 160/80   Pulse: 67    Resp: 16    SpO2: 95%    Weight: 138 lb (62.6 kg)    Height: 5' 3\" (1.6 m)       Body mass index is 24.45 kg/m². Allergies   Allergen Reactions    Tylenol [Acetaminophen] Nausea And Vomiting     Prior to Visit Medications    Medication Sig Taking? Authorizing Provider   amitriptyline (ELAVIL) 100 MG tablet TAKE 1 TABLET BY MOUTH EVERY NIGHT AT BEDTIME  Historical Provider, MD   traZODone (DESYREL) 50 MG tablet Take 1 tablet by mouth nightly as needed for Sleep  Erlinda Najera MD   pantoprazole (PROTONIX) 40 MG tablet Take 1 tablet by mouth in the morning. Erlinda Najera MD   OXcarbazepine (TRILEPTAL) 600 MG tablet Take 1 tablet by mouth in the morning and 1 tablet at noon and 1 tablet before bedtime. Erlinda Najera MD   metoprolol succinate (TOPROL XL) 25 MG extended release tablet Take 1 tablet by mouth in the morning. Erlinda Najera MD   meclizine (ANTIVERT) 12.5 MG tablet Take 1 tablet by mouth 3 times daily as needed for Dizziness  Erlinda Najera MD   gabapentin (NEURONTIN) 800 MG tablet Take 1 tablet by mouth in the morning and 1 tablet at noon and 1 tablet before bedtime. Do all this for 180 days. Erlinda Najera MD   dicyclomine (BENTYL) 20 MG tablet Take 1 tablet by mouth every 6 hours as needed (stomach cramps)  Erlinda Najera MD   baclofen (LIORESAL) 10 MG tablet Take 1 tablet by mouth every evening  Erlinda Najera MD   lisinopril (PRINIVIL;ZESTRIL) 40 MG tablet Take 1 tablet by mouth in the morning.   Erlinda Najera MD   Naproxen Sodium 220 MG CAPS Take by mouth  Historical Provider, MD Webb (Including outside providers/suppliers regularly involved in providing care):   Patient Care Team:  Jocelyne Wellington MD as PCP - General (Family Medicine)  Jocelyne Wellington MD as PCP - [address] [place] [address] Dr Blood Provider     Reviewed and updated this visit:  Tobacco  Allergies  Meds  Problems  Med Hx  Surg Hx  Soc Hx  Fam Hx

## 2022-08-01 NOTE — PATIENT INSTRUCTIONS
Personalized Preventive Plan for Harry Arguello - 8/1/2022  Medicare offers a range of preventive health benefits. Some of the tests and screenings are paid in full while other may be subject to a deductible, co-insurance, and/or copay. Some of these benefits include a comprehensive review of your medical history including lifestyle, illnesses that may run in your family, and various assessments and screenings as appropriate. After reviewing your medical record and screening and assessments performed today your provider may have ordered immunizations, labs, imaging, and/or referrals for you. A list of these orders (if applicable) as well as your Preventive Care list are included within your After Visit Summary for your review. Other Preventive Recommendations:    A preventive eye exam performed by an eye specialist is recommended every 1-2 years to screen for glaucoma; cataracts, macular degeneration, and other eye disorders. A preventive dental visit is recommended every 6 months. Try to get at least 150 minutes of exercise per week or 10,000 steps per day on a pedometer . Order or download the FREE \"Exercise & Physical Activity: Your Everyday Guide\" from [employer]. Call 9-843.959.4740 or search [employer] online. You need 7013-6462 mg of calcium and 3731-1074 IU of vitamin D per day. It is possible to meet your calcium requirement with diet alone, but a vitamin D supplement is usually necessary to meet this goal.  When exposed to the sun, use a sunscreen that protects against both UVA and UVB radiation with an SPF of 30 or greater. Reapply every 2 to 3 hours or after sweating, drying off with a towel, or swimming. Always wear a seat belt when traveling in a car. Always wear a helmet when riding a bicycle or motorcycle.

## 2022-08-02 LAB
HEPATITIS C ANTIBODY INTERPRETATION: NORMAL
HIV-1 AND HIV-2 ANTIBODIES: NORMAL

## 2022-08-10 VITALS
DIASTOLIC BLOOD PRESSURE: 80 MMHG | HEIGHT: 63 IN | OXYGEN SATURATION: 95 % | SYSTOLIC BLOOD PRESSURE: 160 MMHG | BODY MASS INDEX: 24.45 KG/M2 | HEART RATE: 67 BPM | RESPIRATION RATE: 16 BRPM | WEIGHT: 138 LBS

## 2022-08-10 DIAGNOSIS — E87.1 HYPONATREMIA: Primary | ICD-10-CM

## 2022-08-10 RX ORDER — SODIUM CHLORIDE 1000 MG
2 TABLET, SOLUBLE MISCELLANEOUS 2 TIMES DAILY
Qty: 360 TABLET | Refills: 1 | Status: SHIPPED | OUTPATIENT
Start: 2022-08-10

## 2022-08-11 NOTE — ASSESSMENT & PLAN NOTE
Improved  Following with GI  Continue Protonix 40 mg daily  Advised patient to stop omeprazole as she was still taking this also

## 2022-08-11 NOTE — ASSESSMENT & PLAN NOTE
Chronic, mild  Likely secondary to oxcarbazepine, lisinopril  Recheck given patient stopped her supplement  Monitor closely given need for increased lisinopril, consider calcium channel blocker if worsens hyponatremia

## 2022-09-10 DIAGNOSIS — K21.9 GASTROESOPHAGEAL REFLUX DISEASE, UNSPECIFIED WHETHER ESOPHAGITIS PRESENT: ICD-10-CM

## 2022-09-12 NOTE — TELEPHONE ENCOUNTER
Last Appointment:  8/1/2022  Future Appointments   Date Time Provider [place]ton King   9/16/2022  2:30 PM Lauro Kawasaki, MD [facility]   8/14/2023  3:30 PM Lauro Kawasaki, MD [address] [place] [address]

## 2022-09-15 RX ORDER — OMEPRAZOLE 20 MG/1
CAPSULE, DELAYED RELEASE ORAL
Qty: 30 CAPSULE | Refills: 2 | OUTPATIENT
Start: 2022-09-15

## 2022-09-15 RX ORDER — OMEPRAZOLE 20 MG/1
CAPSULE, DELAYED RELEASE ORAL
Qty: 30 CAPSULE | Refills: 1 | OUTPATIENT
Start: 2022-09-15

## 2022-09-15 RX ORDER — OMEPRAZOLE 20 MG/1
CAPSULE, DELAYED RELEASE ORAL
COMMUNITY
Start: 2022-08-20 | End: 2022-09-15

## 2022-09-19 RX ORDER — OMEPRAZOLE 20 MG/1
CAPSULE, DELAYED RELEASE ORAL
Qty: 30 CAPSULE | Refills: 5 | OUTPATIENT
Start: 2022-09-19

## 2022-10-03 DIAGNOSIS — K21.9 GASTROESOPHAGEAL REFLUX DISEASE, UNSPECIFIED WHETHER ESOPHAGITIS PRESENT: ICD-10-CM

## 2022-10-09 RX ORDER — DICYCLOMINE HCL 20 MG
TABLET ORAL
Qty: 360 TABLET | Refills: 1 | Status: SHIPPED | OUTPATIENT
Start: 2022-10-09

## 2022-11-04 DIAGNOSIS — I10 PRIMARY HYPERTENSION: ICD-10-CM

## 2022-11-04 NOTE — TELEPHONE ENCOUNTER
Patient already has a refill on this medication. She also needs to be rescheduled for her follow-up of blood pressure. Please call patient.

## 2022-12-01 NOTE — TELEPHONE ENCOUNTER
Called patient.   Received a recording she was unable to answer and leave a message and not able to leave a msg on her voicemail

## 2022-12-07 NOTE — TELEPHONE ENCOUNTER
Called patient was unable to leave msg for voicemail was full.   Sent letter stating we are trying to get ahold of her

## 2022-12-20 RX ORDER — LISINOPRIL 40 MG/1
40 TABLET ORAL DAILY
Qty: 90 TABLET | Refills: 1 | OUTPATIENT
Start: 2022-12-20

## 2023-01-09 ENCOUNTER — COMMUNITY OUTREACH (OUTPATIENT)
Dept: PRIMARY CARE CLINIC | Age: 68
End: 2023-01-09

## 2023-01-26 NOTE — ASSESSMENT & PLAN NOTE
Continue trazodone 50 mg nightly Ruby called and wanted to know if Haylee needed an apt along with the recheck on the potassium?

## 2023-02-11 LAB
ALBUMIN SERPL-MCNC: NORMAL G/DL
ALP BLD-CCNC: NORMAL U/L
ALT SERPL-CCNC: NORMAL U/L
ANION GAP SERPL CALCULATED.3IONS-SCNC: NORMAL MMOL/L
AST SERPL-CCNC: NORMAL U/L
BASOPHILS ABSOLUTE: NORMAL
BASOPHILS RELATIVE PERCENT: NORMAL
BILIRUB SERPL-MCNC: NORMAL MG/DL
BILIRUBIN, URINE: NORMAL
BLOOD, URINE: NORMAL
BUN BLDV-MCNC: NORMAL MG/DL
CALCIUM SERPL-MCNC: NORMAL MG/DL
CHLORIDE BLD-SCNC: NORMAL MMOL/L
CLARITY: NORMAL
CO2: NORMAL
COLOR: NORMAL
CREAT SERPL-MCNC: NORMAL MG/DL
EGFR: NORMAL
EOSINOPHILS ABSOLUTE: NORMAL
EOSINOPHILS RELATIVE PERCENT: NORMAL
GLUCOSE BLD-MCNC: NORMAL MG/DL
GLUCOSE URINE: NORMAL
HCT VFR BLD CALC: NORMAL %
HEMOGLOBIN: NORMAL
KETONES, URINE: NORMAL
LEUKOCYTE ESTERASE, URINE: NORMAL
LYMPHOCYTES ABSOLUTE: NORMAL
LYMPHOCYTES RELATIVE PERCENT: NORMAL
MCH RBC QN AUTO: NORMAL PG
MCHC RBC AUTO-ENTMCNC: NORMAL G/DL
MCV RBC AUTO: NORMAL FL
MONOCYTES ABSOLUTE: NORMAL
MONOCYTES RELATIVE PERCENT: NORMAL
NEUTROPHILS ABSOLUTE: NORMAL
NEUTROPHILS RELATIVE PERCENT: NORMAL
NITRITE, URINE: NORMAL
PDW BLD-RTO: NORMAL %
PH UA: NORMAL
PLATELET # BLD: NORMAL 10*3/UL
PMV BLD AUTO: NORMAL FL
POTASSIUM SERPL-SCNC: NORMAL MMOL/L
PROTEIN UA: NORMAL
RBC # BLD: NORMAL 10*6/UL
SODIUM BLD-SCNC: NORMAL MMOL/L
SPECIFIC GRAVITY, URINE: NORMAL
TOTAL PROTEIN: NORMAL
UROBILINOGEN, URINE: NORMAL
WBC # BLD: NORMAL 10*3/UL

## 2023-02-12 ENCOUNTER — HOSPITAL ENCOUNTER (EMERGENCY)
Age: 68
Discharge: HOME | End: 2023-02-12
Attending: EMERGENCY MEDICINE
Payer: COMMERCIAL

## 2023-02-12 ENCOUNTER — APPOINTMENT (OUTPATIENT)
Dept: GENERAL RADIOLOGY | Age: 68
End: 2023-02-12
Payer: COMMERCIAL

## 2023-02-12 ENCOUNTER — APPOINTMENT (OUTPATIENT)
Dept: CT IMAGING | Age: 68
End: 2023-02-12
Payer: COMMERCIAL

## 2023-02-12 VITALS
BODY MASS INDEX: 23.92 KG/M2 | SYSTOLIC BLOOD PRESSURE: 174 MMHG | DIASTOLIC BLOOD PRESSURE: 99 MMHG | OXYGEN SATURATION: 98 % | RESPIRATION RATE: 14 BRPM | HEIGHT: 63 IN | TEMPERATURE: 98.3 F | WEIGHT: 135 LBS | HEART RATE: 80 BPM

## 2023-02-12 DIAGNOSIS — K59.00 CONSTIPATION, UNSPECIFIED CONSTIPATION TYPE: Primary | ICD-10-CM

## 2023-02-12 LAB
ALBUMIN SERPL-MCNC: 4.9 G/DL (ref 3.5–5.2)
ALP BLD-CCNC: 131 U/L (ref 35–104)
ALT SERPL-CCNC: 15 U/L (ref 0–32)
ANION GAP SERPL CALCULATED.3IONS-SCNC: 15 MMOL/L (ref 7–16)
AST SERPL-CCNC: 28 U/L (ref 0–31)
BACTERIA: NORMAL /HPF
BASOPHILS ABSOLUTE: 0.03 E9/L (ref 0–0.2)
BASOPHILS RELATIVE PERCENT: 0.3 % (ref 0–2)
BILIRUB SERPL-MCNC: 0.3 MG/DL (ref 0–1.2)
BILIRUBIN URINE: NEGATIVE
BLOOD, URINE: ABNORMAL
BUN BLDV-MCNC: 8 MG/DL (ref 6–23)
CALCIUM SERPL-MCNC: 10.3 MG/DL (ref 8.6–10.2)
CHLORIDE BLD-SCNC: 95 MMOL/L (ref 98–107)
CLARITY: CLEAR
CO2: 23 MMOL/L (ref 22–29)
COLOR: YELLOW
CREAT SERPL-MCNC: 0.4 MG/DL (ref 0.5–1)
EKG ATRIAL RATE: 88 BPM
EKG P AXIS: 51 DEGREES
EKG P-R INTERVAL: 138 MS
EKG Q-T INTERVAL: 370 MS
EKG QRS DURATION: 88 MS
EKG QTC CALCULATION (BAZETT): 447 MS
EKG R AXIS: 33 DEGREES
EKG T AXIS: 64 DEGREES
EKG VENTRICULAR RATE: 88 BPM
EOSINOPHILS ABSOLUTE: 0.01 E9/L (ref 0.05–0.5)
EOSINOPHILS RELATIVE PERCENT: 0.1 % (ref 0–6)
GFR SERPL CREATININE-BSD FRML MDRD: >60 ML/MIN/1.73
GLUCOSE BLD-MCNC: 111 MG/DL (ref 74–99)
GLUCOSE URINE: NEGATIVE MG/DL
HCT VFR BLD CALC: 44.4 % (ref 34–48)
HEMOGLOBIN: 14.7 G/DL (ref 11.5–15.5)
IMMATURE GRANULOCYTES #: 0.02 E9/L
IMMATURE GRANULOCYTES %: 0.2 % (ref 0–5)
KETONES, URINE: ABNORMAL MG/DL
LACTIC ACID: 1.5 MMOL/L (ref 0.5–2.2)
LEUKOCYTE ESTERASE, URINE: NEGATIVE
LIPASE: 44 U/L (ref 13–60)
LYMPHOCYTES ABSOLUTE: 1.8 E9/L (ref 1.5–4)
LYMPHOCYTES RELATIVE PERCENT: 20.8 % (ref 20–42)
MCH RBC QN AUTO: 30.8 PG (ref 26–35)
MCHC RBC AUTO-ENTMCNC: 33.1 % (ref 32–34.5)
MCV RBC AUTO: 93.1 FL (ref 80–99.9)
MONOCYTES ABSOLUTE: 0.53 E9/L (ref 0.1–0.95)
MONOCYTES RELATIVE PERCENT: 6.1 % (ref 2–12)
NEUTROPHILS ABSOLUTE: 6.26 E9/L (ref 1.8–7.3)
NEUTROPHILS RELATIVE PERCENT: 72.5 % (ref 43–80)
NITRITE, URINE: NEGATIVE
PDW BLD-RTO: 12.3 FL (ref 11.5–15)
PH UA: 7 (ref 5–9)
PLATELET # BLD: 302 E9/L (ref 130–450)
PMV BLD AUTO: 9 FL (ref 7–12)
POTASSIUM SERPL-SCNC: 4.1 MMOL/L (ref 3.5–5)
PROTEIN UA: NEGATIVE MG/DL
RBC # BLD: 4.77 E12/L (ref 3.5–5.5)
RBC UA: NORMAL /HPF (ref 0–2)
SODIUM BLD-SCNC: 133 MMOL/L (ref 132–146)
SPECIFIC GRAVITY UA: 1.01 (ref 1–1.03)
TOTAL PROTEIN: 8.6 G/DL (ref 6.4–8.3)
TROPONIN, HIGH SENSITIVITY: 7 NG/L (ref 0–9)
UROBILINOGEN, URINE: 0.2 E.U./DL
WBC # BLD: 8.7 E9/L (ref 4.5–11.5)
WBC UA: NORMAL /HPF (ref 0–5)

## 2023-02-12 PROCEDURE — 93005 ELECTROCARDIOGRAM TRACING: CPT

## 2023-02-12 PROCEDURE — 2580000003 HC RX 258

## 2023-02-12 PROCEDURE — 6360000002 HC RX W HCPCS

## 2023-02-12 PROCEDURE — 71045 X-RAY EXAM CHEST 1 VIEW: CPT

## 2023-02-12 PROCEDURE — 74177 CT ABD & PELVIS W/CONTRAST: CPT

## 2023-02-12 PROCEDURE — C9113 INJ PANTOPRAZOLE SODIUM, VIA: HCPCS

## 2023-02-12 PROCEDURE — A4216 STERILE WATER/SALINE, 10 ML: HCPCS

## 2023-02-12 PROCEDURE — 6360000004 HC RX CONTRAST MEDICATION: Performed by: RADIOLOGY

## 2023-02-12 PROCEDURE — 85025 COMPLETE CBC W/AUTO DIFF WBC: CPT

## 2023-02-12 PROCEDURE — 81001 URINALYSIS AUTO W/SCOPE: CPT

## 2023-02-12 PROCEDURE — 80053 COMPREHEN METABOLIC PANEL: CPT

## 2023-02-12 PROCEDURE — 99285 EMERGENCY DEPT VISIT HI MDM: CPT

## 2023-02-12 PROCEDURE — 83690 ASSAY OF LIPASE: CPT

## 2023-02-12 PROCEDURE — 96375 TX/PRO/DX INJ NEW DRUG ADDON: CPT

## 2023-02-12 PROCEDURE — 93010 ELECTROCARDIOGRAM REPORT: CPT | Performed by: INTERNAL MEDICINE

## 2023-02-12 PROCEDURE — 84484 ASSAY OF TROPONIN QUANT: CPT

## 2023-02-12 PROCEDURE — 83605 ASSAY OF LACTIC ACID: CPT

## 2023-02-12 PROCEDURE — 96374 THER/PROPH/DIAG INJ IV PUSH: CPT

## 2023-02-12 RX ORDER — ONDANSETRON 2 MG/ML
4 INJECTION INTRAMUSCULAR; INTRAVENOUS ONCE
Status: COMPLETED | OUTPATIENT
Start: 2023-02-12 | End: 2023-02-12

## 2023-02-12 RX ORDER — 0.9 % SODIUM CHLORIDE 0.9 %
1000 INTRAVENOUS SOLUTION INTRAVENOUS ONCE
Status: COMPLETED | OUTPATIENT
Start: 2023-02-12 | End: 2023-02-12

## 2023-02-12 RX ORDER — MORPHINE SULFATE 2 MG/ML
2 INJECTION, SOLUTION INTRAMUSCULAR; INTRAVENOUS ONCE
Status: COMPLETED | OUTPATIENT
Start: 2023-02-12 | End: 2023-02-12

## 2023-02-12 RX ORDER — POLYETHYLENE GLYCOL 3350 17 G/17G
17 POWDER, FOR SOLUTION ORAL DAILY PRN
Qty: 30 EACH | Refills: 0 | Status: SHIPPED | OUTPATIENT
Start: 2023-02-12 | End: 2023-03-14

## 2023-02-12 RX ADMIN — SODIUM CHLORIDE 40 MG: 9 INJECTION, SOLUTION INTRAMUSCULAR; INTRAVENOUS; SUBCUTANEOUS at 09:52

## 2023-02-12 RX ADMIN — ONDANSETRON 4 MG: 2 INJECTION INTRAMUSCULAR; INTRAVENOUS at 09:51

## 2023-02-12 RX ADMIN — MORPHINE SULFATE 2 MG: 2 INJECTION, SOLUTION INTRAMUSCULAR; INTRAVENOUS at 09:52

## 2023-02-12 RX ADMIN — IOPAMIDOL 75 ML: 755 INJECTION, SOLUTION INTRAVENOUS at 10:52

## 2023-02-12 RX ADMIN — SODIUM CHLORIDE 1000 ML: 9 INJECTION, SOLUTION INTRAVENOUS at 09:50

## 2023-02-12 ASSESSMENT — ENCOUNTER SYMPTOMS
ABDOMINAL PAIN: 1
CHOKING: 0
SHORTNESS OF BREATH: 0
VOMITING: 1
PHOTOPHOBIA: 0
EYE PAIN: 0
BACK PAIN: 0
NAUSEA: 1
CONSTIPATION: 0
SORE THROAT: 0
FACIAL SWELLING: 0
DIARRHEA: 0
COUGH: 0

## 2023-02-12 ASSESSMENT — PAIN - FUNCTIONAL ASSESSMENT
PAIN_FUNCTIONAL_ASSESSMENT: 0-10
PAIN_FUNCTIONAL_ASSESSMENT: 0-10

## 2023-02-12 ASSESSMENT — PAIN DESCRIPTION - LOCATION
LOCATION: ABDOMEN;CHEST
LOCATION: HEAD

## 2023-02-12 ASSESSMENT — PAIN SCALES - GENERAL
PAINLEVEL_OUTOF10: 8
PAINLEVEL_OUTOF10: 8
PAINLEVEL_OUTOF10: 10

## 2023-02-12 ASSESSMENT — PAIN DESCRIPTION - PAIN TYPE: TYPE: ACUTE PAIN

## 2023-02-12 NOTE — ED PROVIDER NOTES
[facility]  Department of Emergency Medicine     Written by: Lindsay Loza MD  Patient Name: Gabriela Aragon  Attending Provider: Angelica Paige DO  Admit Date: 2023  8:25 AM  MRN: [id number]                   : 1955        Chief Complaint   Patient presents with    Emesis     X 15 days    Cough    Dizziness    Chest Pain     Inspiratory chest pain     - Chief complaint    This is a 72-year-old female with history of acid reflux, trigeminal neuralgia, depression that presents ED with complaints of acute severe abdominal pain, inferior midline chest pain nausea, vomiting for the past 15 days. The patient states that yesterday she was in [place] evaluating her symptoms, and she reportedly had blood work, urinalysis and CT abdomen performed, which revealed no acute abnormalities. The patient received morphine according to her and was discharged home. She was told that her symptoms been improved she should go to another ER. They said that she not been able to drink or eat anything in the past 15 days. She has not been able to take her acid reflux medications as well as she keeps throwing it up. The patient states that the pain is mainly epigastric and inferior chest.  She says she never had heart problems, does not have a cardiologist.  She says over the weekend, she had a fever of 103 °F for 2 days, however that broke yesterday. Review of Systems   Constitutional:  Positive for fever. Negative for appetite change, chills and diaphoresis. HENT:  Negative for ear discharge, ear pain, facial swelling, sneezing and sore throat. Eyes:  Negative for photophobia and pain. Respiratory:  Negative for cough, choking and shortness of breath. Cardiovascular:  Positive for chest pain. Negative for palpitations. Gastrointestinal:  Positive for abdominal pain, nausea and vomiting. Negative for constipation and diarrhea.    Genitourinary:  Negative for dysuria, enuresis, flank pain, frequency and hematuria. Musculoskeletal:  Negative for arthralgias, back pain, joint swelling, myalgias and neck pain. Skin:  Negative for pallor and rash. Neurological:  Negative for weakness, light-headedness and headaches. Physical Exam  Constitutional:       General: She is not in acute distress. Appearance: Normal appearance. She is not ill-appearing. HENT:      Head: Normocephalic and atraumatic. Nose: Nose normal. No congestion. Mouth/Throat:      Mouth: Mucous membranes are moist.      Pharynx: No posterior oropharyngeal erythema. Eyes:      General: No scleral icterus. Extraocular Movements: Extraocular movements intact. Pupils: Pupils are equal, round, and reactive to light. Cardiovascular:      Rate and Rhythm: Normal rate and regular rhythm. Pulses: Normal pulses. Heart sounds: Normal heart sounds. Pulmonary:      Effort: Pulmonary effort is normal. No respiratory distress. Breath sounds: Normal breath sounds. No stridor. No wheezing or rhonchi. Chest:      Chest wall: No tenderness. Abdominal:      General: Bowel sounds are normal. There is no distension. Palpations: Abdomen is soft. There is no mass. Tenderness: There is abdominal tenderness. Musculoskeletal:         General: No swelling, tenderness or deformity. Normal range of motion. Cervical back: Normal range of motion. No rigidity or tenderness. Skin:     Capillary Refill: Capillary refill takes less than 2 seconds. Coloration: Skin is not jaundiced or pale. Findings: No erythema. Neurological:      General: No focal deficit present. Mental Status: She is alert and oriented to person, place, and time. Mental status is at baseline. Procedures       MDM  Number of Diagnoses or Management Options  Constipation, unspecified constipation type  Diagnosis management comments:  This is a 79-year-old female with history of acid reflux, trigeminal neuralgia, depression that presents ED with complaints of acute severe abdominal pain, inferior midline chest pain nausea, vomiting for the past 15 days. The patient here in the ED is not hypoxic and hemodynamically stable albeit hypertension of 198/108, and does appear to be in acute distress. They are crying while speaking coming and having difficulty due to pain. The patient has clear lungs to auscultation, no abnormal heart murmurs are heard. On abdominal palpation, the patient has tenderness to the epigastrium and right lower quadrant. There are no signs of jaundice or scleral icterus. No visible hernias on exam. Troponin ordered to evaluate for possible cardiac etiology of symptoms including but not limited to STEMI or NSTEMI. A CT of the abdomen was ordered to evaluate for signs of gall bladder inflammation, constipation, signs of appendicitis, small bowel obstruction, bowel ischemia, pneumoperitoneum, organomegaly or any other pathology which may be the cause of patient's symptoms. Remainder of MDM will be in the ED course below. ED Course as of 02/12/23 1308   Sun Feb 12, 2023   5225 Patient will be given 2 mg morphine IV for pain as she is in acute distress from pain. [AH]   0846 Upon chart review, patient saw Dr. Cruz Shaffer on August 1, 2022 and was instructed to remove her clonidine patch and stop taking clonidine, and she increased her lisinopril to 40 mg daily for blood pressure control. [AH]   8457 Social determinants: Patient financially stable, does not require placement at this time. [AH]   8228 Acute/chronic illness: Patient in acute distress due to pain, crying, had imaging and labs done yesterday with no results yielded. She will be given analgesics now as she is in distress. [AH]   0853 EKG: This EKG is signed and interpreted by me.     Rate: 88  Rhythm: Sinus  Interpretation: no acute changes and non-specific EKG  Comparison: was normal   [TG]   1014 ATTENDING PROVIDER ATTESTATION:     I have personally performed and/or participated in the history, exam, medical decision making, and procedures and agree with all pertinent clinical information unless otherwise noted. I have also reviewed and agree with the past medical, family and social history unless otherwise noted. I have discussed this patient in detail with the resident and provided the instruction and education regarding the evidence-based evaluation and treatment of nausea and vomiting. Any EKG that may have been performed has been personally reviewed by me and I agree with the documentation as noted by the resident. History: Patient reports 2 weeks of nausea vomiting associated with nonproductive cough, dizziness and inspiratory chest pain. My findings: Lauri Apley is a 74 y.o. female whom is in no distress. Physical exam reveals she is alert and oriented. Heart is regular, lungs are coarse bilaterally. Abdomen soft and nontender. Extremities are intact without edema, good distal pulses and capillary refill. Skin is warm and dry. My plan: Symptomatic and supportive care. Labs, EKG, x-ray and CT. Electronically signed by Elana Watts DO on 2/12/23 at 10:14 AM EST       [TG]   1110 Patient's labs are largely unremarkable for any source of infection, including UTI or diverticulitis. Metabolic panel reveals no electro abnormalities. No signs of dehydration on lab results. CT reveals concerns for colonic distention with fecal retention. Will discuss these findings with the patient. [UH]   4154 Attempted rectal disimpaction with PAM Moyer at bedside, however no stool was felt in the rectal vault. Patient be given Fleet enema, and observed for passage of stool [AH]   1229 Patient had Fleet enema placed, however did not pass much stool [AH]   1243 Soapsuds ordered for the patient [TR]   0837 Patient defecated a large volume of stool with soapsuds.   She feels better and states that she is okay to follow-up outpatient, she will be discharged with MiraLAX as needed at home for constipation. Return precautions are given patient is ready for discharge. [AH]      ED Course User Index  [AH] Kelly Irwin MD  [TG] Tish Fulton,        Chart review: present in the ED course    Social determinants: present in the ED course    Acute or chronic illness(es): present in the ED course    --------------------------------------------- PAST HISTORY ---------------------------------------------  Past Medical History:  has a past medical history of Acid reflux, Depression, History of suicide attempt, Ingestion of substance, and Trigeminal neuralgia. Past Surgical History:  has a past surgical history that includes brain surgery; facial nerve decompression; Total abdominal hysterectomy w/ bilateral salpingoophorectomy; and Carpal tunnel release (Bilateral). Social History:  reports that she has never smoked. She has never used smokeless tobacco. She reports that she does not drink alcohol and does not use drugs. Family History: family history includes Blindness in her son; Heart Disease in her father and mother; No Known Problems in her brother, brother, sister, sister, and sister; Premature Birth in her son. The patients home medications have been reviewed.     Allergies: Tylenol [acetaminophen]    -------------------------------------------------- RESULTS -------------------------------------------------  Labs personally reviewed and interpreted by me:  Results for orders placed or performed during the hospital encounter of 02/12/23   CBC with Auto Differential   Result Value Ref Range    WBC 8.7 4.5 - 11.5 E9/L    RBC 4.77 3.50 - 5.50 E12/L    Hemoglobin 14.7 11.5 - 15.5 g/dL    Hematocrit 44.4 34.0 - 48.0 %    MCV 93.1 80.0 - 99.9 fL    MCH 30.8 26.0 - 35.0 pg    MCHC 33.1 32.0 - 34.5 %    RDW 12.3 11.5 - 15.0 fL    Platelets 260 741 - 595 E9/L    MPV 9.0 7.0 - 12.0 fL    Neutrophils % 72.5 43.0 - 80.0 %    Immature Granulocytes % 0.2 0.0 - 5.0 %    Lymphocytes % 20.8 20.0 - 42.0 %    Monocytes % 6.1 2.0 - 12.0 %    Eosinophils % 0.1 0.0 - 6.0 %    Basophils % 0.3 0.0 - 2.0 %    Neutrophils Absolute 6.26 1.80 - 7.30 E9/L    Immature Granulocytes # 0.02 E9/L    Lymphocytes Absolute 1.80 1.50 - 4.00 E9/L    Monocytes Absolute 0.53 0.10 - 0.95 E9/L    Eosinophils Absolute 0.01 (L) 0.05 - 0.50 E9/L    Basophils Absolute 0.03 0.00 - 0.20 E9/L   CMP   Result Value Ref Range    Sodium 133 132 - 146 mmol/L    Potassium 4.1 3.5 - 5.0 mmol/L    Chloride 95 (L) 98 - 107 mmol/L    CO2 23 22 - 29 mmol/L    Anion Gap 15 7 - 16 mmol/L    Glucose 111 (H) 74 - 99 mg/dL    BUN 8 6 - 23 mg/dL    Creatinine 0.4 (L) 0.5 - 1.0 mg/dL    Est, Glom Filt Rate >60 >=60 mL/min/1.73    Calcium 10.3 (H) 8.6 - 10.2 mg/dL    Total Protein 8.6 (H) 6.4 - 8.3 g/dL    Albumin 4.9 3.5 - 5.2 g/dL    Total Bilirubin 0.3 0.0 - 1.2 mg/dL    Alkaline Phosphatase 131 (H) 35 - 104 U/L    ALT 15 0 - 32 U/L    AST 28 0 - 31 U/L   Lipase   Result Value Ref Range    Lipase 44 13 - 60 U/L   Lactic Acid   Result Value Ref Range    Lactic Acid 1.5 0.5 - 2.2 mmol/L   Urinalysis   Result Value Ref Range    Color, UA Yellow Straw/Yellow    Clarity, UA Clear Clear    Glucose, Ur Negative Negative mg/dL    Bilirubin Urine Negative Negative    Ketones, Urine TRACE (A) Negative mg/dL    Specific Gravity, UA 1.010 1.005 - 1.030    Blood, Urine SMALL (A) Negative    pH, UA 7.0 5.0 - 9.0    Protein, UA Negative Negative mg/dL    Urobilinogen, Urine 0.2 <2.0 E.U./dL    Nitrite, Urine Negative Negative    Leukocyte Esterase, Urine Negative Negative   Troponin   Result Value Ref Range    Troponin, High Sensitivity 7 0 - 9 ng/L   Microscopic Urinalysis   Result Value Ref Range    WBC, UA NONE 0 - 5 /HPF    RBC, UA 0-1 0 - 2 /HPF    Bacteria, UA NONE SEEN None Seen /HPF   EKG 12 Lead   Result Value Ref Range    Ventricular Rate 88 BPM    Atrial Rate 88 BPM    P-R Interval 138 ms    QRS Duration 88 ms    Q-T Interval 370 ms    QTc Calculation (Bazett) 447 ms    P Axis 51 degrees    R Axis 33 degrees    T Axis 64 degrees       Radiology personally reviewed and interpreted by me:  CT ABDOMEN PELVIS W IV CONTRAST Additional Contrast? None   Final Result   Moderate to large proximal colonic stool burden concerning for stasis of   retention or constipation. Appendix is normal without inflammatory findings. Small hiatal hernia      No acute findings of the gallbladder or hepatic biliary region. XR CHEST PORTABLE   Final Result   No acute process. Medications:  Medications   pantoprazole (PROTONIX) 40 mg in sodium chloride (PF) 0.9 % 10 mL injection (40 mg IntraVENous Given 2/12/23 0952)   0.9 % sodium chloride bolus (0 mLs IntraVENous Stopped 2/12/23 1027)   ondansetron (ZOFRAN) injection 4 mg (4 mg IntraVENous Given 2/12/23 0951)   morphine (PF) injection 2 mg (2 mg IntraVENous Given 2/12/23 0952)   iopamidol (ISOVUE-370) 76 % injection 75 mL (75 mLs IntraVENous Given 2/12/23 1052)       ------------------------- NURSING NOTES AND VITALS REVIEWED ---------------------------  Date / Time Roomed:  2/12/2023  8:25 AM  ED Bed Assignment:  16/16    The nursing notes within the ED encounter and vital signs as below have been reviewed. BP (!) 174/99   Pulse 80   Temp 98.3 °F (36.8 °C) (Oral)   Resp 14   Ht 5' 3\" (1.6 m)   Wt 135 lb (61.2 kg)   SpO2 98%   BMI 23.91 kg/m²     ------------------------------------------ PROGRESS NOTES ------------------------------------------  I have spoken with the patient and discussed todays results, in addition to providing specific details for the plan of care and counseling regarding the diagnosis and prognosis. Their questions are answered at this time and they are agreeable with the plan. I discussed at length with them reasons for immediate return here for re evaluation.  They will followup with primary care by calling their office tomorrow. --------------------------------- ADDITIONAL PROVIDER NOTES ---------------------------------  At this time the patient is without objective evidence of an acute process requiring hospitalization or inpatient management. They have remained hemodynamically stable throughout their entire ED visit and are stable for discharge with outpatient follow-up. The plan has been discussed in detail and they are aware of the specific conditions for emergent return, as well as the importance of follow-up. New Prescriptions    POLYETHYLENE GLYCOL (MIRALAX) 17 G PACKET    Take 17 g by mouth daily as needed for Constipation       Diagnosis:  1. Constipation, unspecified constipation type        Disposition:  Patient's disposition: Discharge to home  Patient's condition is stable. Patient was seen and evaluated by myself and my attending Joon Rojas DO. Assessment and Plan discussed with attending provider, please see attestation for final plan of care.      MD William Morel MD  Resident  02/12/23 7215

## 2023-02-17 ENCOUNTER — OFFICE VISIT (OUTPATIENT)
Dept: PRIMARY CARE CLINIC | Age: 68
End: 2023-02-17
Payer: COMMERCIAL

## 2023-02-17 VITALS
TEMPERATURE: 97.3 F | BODY MASS INDEX: 24.27 KG/M2 | RESPIRATION RATE: 20 BRPM | WEIGHT: 137 LBS | HEIGHT: 63 IN | OXYGEN SATURATION: 97 % | HEART RATE: 92 BPM | SYSTOLIC BLOOD PRESSURE: 180 MMHG | DIASTOLIC BLOOD PRESSURE: 80 MMHG

## 2023-02-17 DIAGNOSIS — G50.0 TRIGEMINAL NEURALGIA: ICD-10-CM

## 2023-02-17 DIAGNOSIS — I10 PRIMARY HYPERTENSION: Primary | ICD-10-CM

## 2023-02-17 DIAGNOSIS — K21.9 GASTROESOPHAGEAL REFLUX DISEASE, UNSPECIFIED WHETHER ESOPHAGITIS PRESENT: ICD-10-CM

## 2023-02-17 DIAGNOSIS — R56.9 SEIZURE (HCC): ICD-10-CM

## 2023-02-17 DIAGNOSIS — R42 VERTIGO: ICD-10-CM

## 2023-02-17 DIAGNOSIS — F51.04 PSYCHOPHYSIOLOGICAL INSOMNIA: ICD-10-CM

## 2023-02-17 DIAGNOSIS — G50.0 ANESTHESIA DOLOROSA IN DISTRIBUTION OF TRIGEMINAL NERVE: ICD-10-CM

## 2023-02-17 DIAGNOSIS — Z59.82 LACK OF ACCESS TO TRANSPORTATION: ICD-10-CM

## 2023-02-17 DIAGNOSIS — M79.7 FIBROMYALGIA: ICD-10-CM

## 2023-02-17 DIAGNOSIS — T74.31XA: ICD-10-CM

## 2023-02-17 DIAGNOSIS — F31.9 BIPOLAR 1 DISORDER (HCC): ICD-10-CM

## 2023-02-17 DIAGNOSIS — K58.1 IRRITABLE BOWEL SYNDROME WITH CONSTIPATION: ICD-10-CM

## 2023-02-17 PROCEDURE — 99214 OFFICE O/P EST MOD 30 MIN: CPT | Performed by: STUDENT IN AN ORGANIZED HEALTH CARE EDUCATION/TRAINING PROGRAM

## 2023-02-17 PROCEDURE — 3074F SYST BP LT 130 MM HG: CPT | Performed by: STUDENT IN AN ORGANIZED HEALTH CARE EDUCATION/TRAINING PROGRAM

## 2023-02-17 PROCEDURE — 1036F TOBACCO NON-USER: CPT | Performed by: STUDENT IN AN ORGANIZED HEALTH CARE EDUCATION/TRAINING PROGRAM

## 2023-02-17 PROCEDURE — 1123F ACP DISCUSS/DSCN MKR DOCD: CPT | Performed by: STUDENT IN AN ORGANIZED HEALTH CARE EDUCATION/TRAINING PROGRAM

## 2023-02-17 PROCEDURE — 3017F COLORECTAL CA SCREEN DOC REV: CPT | Performed by: STUDENT IN AN ORGANIZED HEALTH CARE EDUCATION/TRAINING PROGRAM

## 2023-02-17 PROCEDURE — G8484 FLU IMMUNIZE NO ADMIN: HCPCS | Performed by: STUDENT IN AN ORGANIZED HEALTH CARE EDUCATION/TRAINING PROGRAM

## 2023-02-17 PROCEDURE — G8400 PT W/DXA NO RESULTS DOC: HCPCS | Performed by: STUDENT IN AN ORGANIZED HEALTH CARE EDUCATION/TRAINING PROGRAM

## 2023-02-17 PROCEDURE — 1090F PRES/ABSN URINE INCON ASSESS: CPT | Performed by: STUDENT IN AN ORGANIZED HEALTH CARE EDUCATION/TRAINING PROGRAM

## 2023-02-17 PROCEDURE — G8420 CALC BMI NORM PARAMETERS: HCPCS | Performed by: STUDENT IN AN ORGANIZED HEALTH CARE EDUCATION/TRAINING PROGRAM

## 2023-02-17 PROCEDURE — 3078F DIAST BP <80 MM HG: CPT | Performed by: STUDENT IN AN ORGANIZED HEALTH CARE EDUCATION/TRAINING PROGRAM

## 2023-02-17 PROCEDURE — G8427 DOCREV CUR MEDS BY ELIG CLIN: HCPCS | Performed by: STUDENT IN AN ORGANIZED HEALTH CARE EDUCATION/TRAINING PROGRAM

## 2023-02-17 RX ORDER — GABAPENTIN 800 MG/1
800 TABLET ORAL 3 TIMES DAILY
Qty: 270 TABLET | Refills: 1 | Status: SHIPPED
Start: 2023-02-17 | End: 2023-02-17

## 2023-02-17 RX ORDER — METOPROLOL SUCCINATE 25 MG/1
25 TABLET, EXTENDED RELEASE ORAL DAILY
Qty: 90 TABLET | Refills: 1 | Status: SHIPPED
Start: 2023-02-17 | End: 2023-02-17

## 2023-02-17 RX ORDER — METOPROLOL SUCCINATE 25 MG/1
25 TABLET, EXTENDED RELEASE ORAL DAILY
Qty: 90 TABLET | Refills: 1 | Status: SHIPPED | OUTPATIENT
Start: 2023-02-17

## 2023-02-17 RX ORDER — DICYCLOMINE HCL 20 MG
20 TABLET ORAL EVERY 6 HOURS
Qty: 360 TABLET | Refills: 1 | Status: SHIPPED | OUTPATIENT
Start: 2023-02-17

## 2023-02-17 RX ORDER — BACLOFEN 10 MG/1
10 TABLET ORAL EVERY EVENING
Qty: 90 TABLET | Refills: 1 | Status: SHIPPED | OUTPATIENT
Start: 2023-02-17

## 2023-02-17 RX ORDER — MECLIZINE HCL 12.5 MG/1
12.5 TABLET ORAL 3 TIMES DAILY PRN
Qty: 30 TABLET | Refills: 0 | Status: SHIPPED | OUTPATIENT
Start: 2023-02-17

## 2023-02-17 RX ORDER — TRAZODONE HYDROCHLORIDE 50 MG/1
50 TABLET ORAL NIGHTLY PRN
Qty: 90 TABLET | Refills: 1 | Status: SHIPPED
Start: 2023-02-17 | End: 2023-02-17

## 2023-02-17 RX ORDER — DICYCLOMINE HCL 20 MG
20 TABLET ORAL EVERY 6 HOURS
Qty: 360 TABLET | Refills: 1 | Status: SHIPPED
Start: 2023-02-17 | End: 2023-02-17

## 2023-02-17 RX ORDER — GABAPENTIN 800 MG/1
800 TABLET ORAL 3 TIMES DAILY
Qty: 270 TABLET | Refills: 1 | Status: SHIPPED | OUTPATIENT
Start: 2023-02-17 | End: 2023-08-16

## 2023-02-17 RX ORDER — TRAZODONE HYDROCHLORIDE 50 MG/1
50 TABLET ORAL NIGHTLY PRN
Qty: 90 TABLET | Refills: 1 | Status: SHIPPED | OUTPATIENT
Start: 2023-02-17

## 2023-02-17 RX ORDER — BACLOFEN 10 MG/1
10 TABLET ORAL EVERY EVENING
Qty: 90 TABLET | Refills: 1 | Status: SHIPPED
Start: 2023-02-17 | End: 2023-02-17

## 2023-02-17 RX ORDER — MECLIZINE HCL 12.5 MG/1
12.5 TABLET ORAL 3 TIMES DAILY PRN
Qty: 30 TABLET | Refills: 0 | Status: SHIPPED
Start: 2023-02-17 | End: 2023-02-17

## 2023-02-17 SDOH — ECONOMIC STABILITY - TRANSPORTATION SECURITY: TRANSPORTATION INSECURITY: Z59.82

## 2023-02-17 ASSESSMENT — PATIENT HEALTH QUESTIONNAIRE - PHQ9
SUM OF ALL RESPONSES TO PHQ9 QUESTIONS 1 & 2: 6
2. FEELING DOWN, DEPRESSED OR HOPELESS: 3
SUM OF ALL RESPONSES TO PHQ QUESTIONS 1-9: 21
3. TROUBLE FALLING OR STAYING ASLEEP: 3
9. THOUGHTS THAT YOU WOULD BE BETTER OFF DEAD, OR OF HURTING YOURSELF: 0
5. POOR APPETITE OR OVEREATING: 3
SUM OF ALL RESPONSES TO PHQ QUESTIONS 1-9: 21
8. MOVING OR SPEAKING SO SLOWLY THAT OTHER PEOPLE COULD HAVE NOTICED. OR THE OPPOSITE, BEING SO FIGETY OR RESTLESS THAT YOU HAVE BEEN MOVING AROUND A LOT MORE THAN USUAL: 0
7. TROUBLE CONCENTRATING ON THINGS, SUCH AS READING THE NEWSPAPER OR WATCHING TELEVISION: 3
SUM OF ALL RESPONSES TO PHQ QUESTIONS 1-9: 21
10. IF YOU CHECKED OFF ANY PROBLEMS, HOW DIFFICULT HAVE THESE PROBLEMS MADE IT FOR YOU TO DO YOUR WORK, TAKE CARE OF THINGS AT HOME, OR GET ALONG WITH OTHER PEOPLE: 2
1. LITTLE INTEREST OR PLEASURE IN DOING THINGS: 3
SUM OF ALL RESPONSES TO PHQ QUESTIONS 1-9: 21
4. FEELING TIRED OR HAVING LITTLE ENERGY: 3
6. FEELING BAD ABOUT YOURSELF - OR THAT YOU ARE A FAILURE OR HAVE LET YOURSELF OR YOUR FAMILY DOWN: 3

## 2023-02-17 ASSESSMENT — COLUMBIA-SUICIDE SEVERITY RATING SCALE - C-SSRS
1. WITHIN THE PAST MONTH, HAVE YOU WISHED YOU WERE DEAD OR WISHED YOU COULD GO TO SLEEP AND NOT WAKE UP?: YES
2. HAVE YOU ACTUALLY HAD ANY THOUGHTS OF KILLING YOURSELF?: NO
6. HAVE YOU EVER DONE ANYTHING, STARTED TO DO ANYTHING, OR PREPARED TO DO ANYTHING TO END YOUR LIFE?: NO

## 2023-02-17 ASSESSMENT — ENCOUNTER SYMPTOMS
NAUSEA: 0
CONSTIPATION: 1
SHORTNESS OF BREATH: 0
ABDOMINAL PAIN: 1
VOMITING: 0
BLOOD IN STOOL: 0
DIARRHEA: 0
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
## 2023-02-21 ENCOUNTER — TELEPHONE (OUTPATIENT)
Dept: FAMILY MEDICINE CLINIC | Age: 68
End: 2023-02-21

## 2023-03-10 ENCOUNTER — OFFICE VISIT (OUTPATIENT)
Dept: PRIMARY CARE CLINIC | Age: 68
End: 2023-03-10

## 2023-03-10 VITALS
WEIGHT: 145 LBS | TEMPERATURE: 98.9 F | BODY MASS INDEX: 25.69 KG/M2 | OXYGEN SATURATION: 97 % | HEART RATE: 91 BPM | SYSTOLIC BLOOD PRESSURE: 128 MMHG | HEIGHT: 63 IN | RESPIRATION RATE: 20 BRPM | DIASTOLIC BLOOD PRESSURE: 70 MMHG

## 2023-03-10 DIAGNOSIS — E21.3 HYPERPARATHYROIDISM (HCC): ICD-10-CM

## 2023-03-10 DIAGNOSIS — I10 PRIMARY HYPERTENSION: Primary | ICD-10-CM

## 2023-03-10 DIAGNOSIS — F33.41 RECURRENT MAJOR DEPRESSIVE DISORDER, IN PARTIAL REMISSION (HCC): ICD-10-CM

## 2023-03-10 DIAGNOSIS — R56.9 SEIZURE (HCC): ICD-10-CM

## 2023-03-10 DIAGNOSIS — G50.0 TRIGEMINAL NEURALGIA: ICD-10-CM

## 2023-03-10 PROBLEM — R42 VERTIGO: Status: ACTIVE | Noted: 2023-03-10

## 2023-03-10 RX ORDER — DICYCLOMINE HCL 20 MG
20 TABLET ORAL EVERY 6 HOURS
Qty: 360 TABLET | Refills: 1 | Status: CANCELLED | OUTPATIENT
Start: 2023-03-10

## 2023-03-10 RX ORDER — PANTOPRAZOLE SODIUM 40 MG/1
40 TABLET, DELAYED RELEASE ORAL DAILY
Qty: 90 TABLET | Refills: 1 | Status: CANCELLED | OUTPATIENT
Start: 2023-03-10

## 2023-03-10 RX ORDER — MECLIZINE HCL 12.5 MG/1
12.5 TABLET ORAL 3 TIMES DAILY PRN
Qty: 30 TABLET | Refills: 0 | Status: CANCELLED | OUTPATIENT
Start: 2023-03-10

## 2023-03-10 RX ORDER — METOPROLOL SUCCINATE 25 MG/1
25 TABLET, EXTENDED RELEASE ORAL DAILY
Qty: 90 TABLET | Refills: 1 | Status: CANCELLED | OUTPATIENT
Start: 2023-03-10

## 2023-03-10 ASSESSMENT — ENCOUNTER SYMPTOMS
NAUSEA: 0
VOMITING: 0
CONSTIPATION: 0
ABDOMINAL PAIN: 0
DIARRHEA: 0
ABDOMINAL DISTENTION: 0
BLOOD IN STOOL: 0
SHORTNESS OF BREATH: 0

## 2023-03-10 NOTE — PATIENT INSTRUCTIONS
Please call  Mirta Jones [phone]    Please call [facility] Appointment Desk at [phone] for mammogram    Please call our office back if you do not hear from GI

## 2023-03-10 NOTE — PROGRESS NOTES
ESTABLISHED PRIMARY CARE VISIT    3/10/23  Name: Gabriela Aragon   : 1955   Age: 74 y.o. Sex: female        Assessment & Plan:     Problem List Items Addressed This Visit          Circulatory    Hypertension - Primary     Chronic, now controlled  Continue Toprol XL 25 mg daily  Do not need to restart lisinopril at this time            Endocrine    Hyperparathyroidism ([address]     Recheck with upcoming labs            Nervous and Auditory    Trigeminal neuralgia     Chronic, partially controlled  Following with neurology  Continue oxcarbazepine 600 mg 3 times daily, gabapentin 800 mg 3 times daily            Other    Seizure (HCC)     Chronic, controlled  Associated with trigeminal neuralgia per patient  Following with neurology  Continue oxcarbazepine 600 mg 3 times daily, gabapentin 800 mg 3 times daily         Major depression     Chronic, improved  Continue trazodone 50 mg nightly          Counseled patient regarding above diagnosis, including possible risks and complications, especially if left uncontrolled. Counseled patient as appropriate and relevant regarding any possible side effects, risks, and alternatives to treatment; the patient verbalizes understanding, and is in agreement with the plan as detailed above. All educational materials and instructions were discussed and included on the After Visit Summary. All questions answered to the patient's satisfaction. The patient was advised to call for any concerns prior to next appointment. Return in about 3 months (around 6/10/2023) for follow-up.     Subjective:     Chief Complaint   Patient presents with    Follow-up    Hypertension     Patient returns for follow-up  Has been going to bathroom better lately with Miralax  Awaiting to hear call back from GI to schedule  Did not get message from SW, needs phone number   has been back at home but has not touched her medications    Review of Systems   Constitutional:  Negative for chills and fever. Eyes:  Negative for visual disturbance. Respiratory:  Negative for shortness of breath. Cardiovascular:  Negative for chest pain, palpitations and leg swelling. Gastrointestinal:  Negative for abdominal distention, abdominal pain, blood in stool, constipation, diarrhea, nausea and vomiting. Genitourinary:  Negative for difficulty urinating and dysuria. Musculoskeletal:  Positive for myalgias. Negative for gait problem. Neurological:  Positive for dizziness, numbness (right face) and headaches (right face). Negative for light-headedness. Psychiatric/Behavioral:  Negative for dysphoric mood, sleep disturbance and suicidal ideas. The patient is not nervous/anxious. Medications:     Current Outpatient Medications:     dicyclomine (BENTYL) 20 MG tablet, Take 1 tablet by mouth in the morning and 1 tablet at noon and 1 tablet in the evening and 1 tablet before bedtime. , Disp: 360 tablet, Rfl: 1    meclizine (ANTIVERT) 12.5 MG tablet, Take 1 tablet by mouth 3 times daily as needed for Dizziness, Disp: 30 tablet, Rfl: 0    metoprolol succinate (TOPROL XL) 25 MG extended release tablet, Take 1 tablet by mouth daily, Disp: 90 tablet, Rfl: 1    gabapentin (NEURONTIN) 800 MG tablet, Take 1 tablet by mouth 3 times daily for 180 days. , Disp: 270 tablet, Rfl: 1    traZODone (DESYREL) 50 MG tablet, Take 1 tablet by mouth nightly as needed for Sleep, Disp: 90 tablet, Rfl: 1    polyethylene glycol (MIRALAX) 17 g packet, Take 17 g by mouth daily as needed for Constipation, Disp: 30 each, Rfl: 0    pantoprazole (PROTONIX) 40 MG tablet, Take 1 tablet by mouth in the morning., Disp: 90 tablet, Rfl: 1    OXcarbazepine (TRILEPTAL) 600 MG tablet, Take 1 tablet by mouth in the morning and 1 tablet at noon and 1 tablet before bedtime. , Disp: 270 tablet, Rfl: 1    baclofen (LIORESAL) 10 MG tablet, Take 1 tablet by mouth every evening (Patient not taking: Reported on 3/10/2023), Disp: 90 tablet, Rfl: 1    sodium chloride 1 g tablet, Take 2 tablets by mouth in the morning and at bedtime (Patient not taking: No sig reported), Disp: 360 tablet, Rfl: 1    amitriptyline (ELAVIL) 100 MG tablet, TAKE 1 TABLET BY MOUTH EVERY NIGHT AT BEDTIME (Patient not taking: No sig reported), Disp: , Rfl:     lisinopril (PRINIVIL;ZESTRIL) 40 MG tablet, Take 1 tablet by mouth in the morning. (Patient not taking: No sig reported), Disp: 90 tablet, Rfl: 1    Naproxen Sodium 220 MG CAPS, Take by mouth (Patient not taking: No sig reported), Disp: , Rfl:     Physical Exam:     Vitals:    03/10/23 1414 03/10/23 1450   BP: (!) 142/76 128/70   Pulse: 91    Resp: 20    Temp: 98.9 °F (37.2 °C)    TempSrc: Temporal    SpO2: 97%    Weight: 145 lb (65.8 kg)    Height: 5' 3\" (1.6 m)      BP Readings from Last 3 Encounters:   03/10/23 128/70   02/17/23 (!) 180/80   02/12/23 (!) 174/99     Wt Readings from Last 3 Encounters:   03/10/23 145 lb (65.8 kg)   02/17/23 137 lb (62.1 kg)   02/12/23 135 lb (61.2 kg)     Physical Exam  Vitals and nursing note reviewed. Constitutional:       General: She is not in acute distress. Appearance: Normal appearance. She is overweight. She is not ill-appearing or diaphoretic. Cardiovascular:      Rate and Rhythm: Normal rate and regular rhythm. Heart sounds: Normal heart sounds. Pulmonary:      Effort: Pulmonary effort is normal. No respiratory distress. Breath sounds: Normal breath sounds. Musculoskeletal:      Right lower leg: No edema. Left lower leg: No edema. Skin:     General: Skin is warm and dry. Neurological:      Mental Status: She is alert and oriented to person, place, and time. Psychiatric:         Attention and Perception: Attention and perception normal.         Mood and Affect: Affect normal. Mood is anxious. Speech: Speech normal.         Behavior: Behavior normal. Behavior is cooperative. Thought Content:  Thought content normal.     Testing:   No orders of the defined types were placed in this encounter. No results found for this or any previous visit (from the past 24 hour(s)).

## 2023-03-11 NOTE — ASSESSMENT & PLAN NOTE
Chronic, now controlled  Continue Toprol XL 25 mg daily  Do not need to restart lisinopril at this time

## 2023-03-11 NOTE — ASSESSMENT & PLAN NOTE
Chronic, partially controlled  Following with neurology  Continue oxcarbazepine 600 mg 3 times daily, gabapentin 800 mg 3 times daily

## 2023-03-11 NOTE — ASSESSMENT & PLAN NOTE
Chronic, controlled  Associated with trigeminal neuralgia per patient  Following with neurology  Continue oxcarbazepine 600 mg 3 times daily, gabapentin 800 mg 3 times daily

## 2023-04-19 ENCOUNTER — TELEPHONE (OUTPATIENT)
Dept: PRIMARY CARE CLINIC | Age: 68
End: 2023-04-19

## 2023-04-19 DIAGNOSIS — K58.1 IRRITABLE BOWEL SYNDROME WITH CONSTIPATION: Primary | ICD-10-CM

## 2023-04-19 RX ORDER — POLYETHYLENE GLYCOL 3350 17 G/17G
17 POWDER, FOR SOLUTION ORAL DAILY PRN
Qty: 1530 G | Refills: 1 | Status: SHIPPED | OUTPATIENT
Start: 2023-04-19

## 2023-04-24 ENCOUNTER — OFFICE VISIT (OUTPATIENT)
Dept: FAMILY MEDICINE CLINIC | Age: 68
End: 2023-04-24

## 2023-04-24 VITALS
HEART RATE: 67 BPM | SYSTOLIC BLOOD PRESSURE: 170 MMHG | BODY MASS INDEX: 25.69 KG/M2 | DIASTOLIC BLOOD PRESSURE: 90 MMHG | WEIGHT: 145 LBS | RESPIRATION RATE: 18 BRPM | HEIGHT: 63 IN | OXYGEN SATURATION: 100 % | TEMPERATURE: 97.3 F

## 2023-04-24 DIAGNOSIS — M25.551 RIGHT HIP PAIN: ICD-10-CM

## 2023-04-24 DIAGNOSIS — M25.551 RIGHT HIP PAIN: Primary | ICD-10-CM

## 2023-04-24 RX ORDER — METHYLPREDNISOLONE 4 MG/1
TABLET ORAL
Qty: 1 KIT | Refills: 0 | Status: SHIPPED | OUTPATIENT
Start: 2023-04-24

## 2023-04-24 RX ORDER — IBUPROFEN 600 MG/1
600 TABLET ORAL 3 TIMES DAILY PRN
Qty: 30 TABLET | Refills: 0 | Status: SHIPPED | OUTPATIENT
Start: 2023-04-24

## 2023-04-24 RX ORDER — KETOROLAC TROMETHAMINE 30 MG/ML
30 INJECTION, SOLUTION INTRAMUSCULAR; INTRAVENOUS ONCE
Status: COMPLETED | OUTPATIENT
Start: 2023-04-24 | End: 2023-04-24

## 2023-04-24 RX ORDER — TRIAMCINOLONE ACETONIDE 40 MG/ML
40 INJECTION, SUSPENSION INTRA-ARTICULAR; INTRAMUSCULAR ONCE
Status: COMPLETED | OUTPATIENT
Start: 2023-04-24 | End: 2023-04-24

## 2023-04-24 RX ADMIN — TRIAMCINOLONE ACETONIDE 40 MG: 40 INJECTION, SUSPENSION INTRA-ARTICULAR; INTRAMUSCULAR at 08:47

## 2023-04-24 RX ADMIN — KETOROLAC TROMETHAMINE 30 MG: 30 INJECTION, SOLUTION INTRAMUSCULAR; INTRAVENOUS at 08:46

## 2023-04-24 NOTE — PROGRESS NOTES
Chief Complaint:   Hip Pain (On and off for a year)      History of Present Illness   Source of history provided by:  patient. Shanell Rojas is a 74 y.o. old female who presents to the [facility] for right hip pain for the past year, but worsening over the past several weeks. States the pain is progressive. She denies any known injury to the hip. Reports moderate pain with movement to the right hip. Pain is also exacerbated by ambulation and sitting. Denies any weakness, paresthesias, calf pain/edema, foot/ankle pain, knee pain, back pain, abrasions, fever, chills, rash, or any other symptoms. There has been no been a history or prior hip Problems. Denies any history of previous hip surgery. X-ray in 2022 revealed mild osteoarthritis. Has been taking aleve and biofreeze OTC without relief. Review of Systems    Unless otherwise stated in this report or unable to obtain because of the patient's clinical or mental status as evidenced by the medical record, this patients's positive and negative responses for Review of Systems, constitutional, psych, eyes, ENT, cardiovascular, respiratory, gastrointestinal, neurological, genitourinary, musculoskeletal, integument systems and systems related to the presenting problem are either stated in the preceding or were negative for the symptoms and/or complaints related to the medical problem.lali. Past Medical History:  has a past medical history of Acid reflux, Depression, History of suicide attempt, Ingestion of substance, and Trigeminal neuralgia. Past Surgical History:  has a past surgical history that includes brain surgery; facial nerve decompression; Total abdominal hysterectomy w/ bilateral salpingoophorectomy; and Carpal tunnel release (Bilateral). Social History:  reports that she has never smoked. She has never used smokeless tobacco. She reports that she does not drink alcohol and does not use drugs.   Family History: family history includes

## 2023-04-25 DIAGNOSIS — M16.11 PRIMARY OSTEOARTHRITIS OF RIGHT HIP: Primary | ICD-10-CM

## 2023-05-01 ENCOUNTER — OFFICE VISIT (OUTPATIENT)
Dept: PRIMARY CARE CLINIC | Age: 68
End: 2023-05-01
Payer: COMMERCIAL

## 2023-05-01 VITALS
TEMPERATURE: 97.8 F | BODY MASS INDEX: 25.34 KG/M2 | HEART RATE: 70 BPM | HEIGHT: 63 IN | RESPIRATION RATE: 20 BRPM | SYSTOLIC BLOOD PRESSURE: 174 MMHG | DIASTOLIC BLOOD PRESSURE: 86 MMHG | OXYGEN SATURATION: 99 % | WEIGHT: 143 LBS

## 2023-05-01 DIAGNOSIS — I10 PRIMARY HYPERTENSION: ICD-10-CM

## 2023-05-01 DIAGNOSIS — M16.11 PRIMARY OSTEOARTHRITIS OF RIGHT HIP: Primary | ICD-10-CM

## 2023-05-01 DIAGNOSIS — G50.0 TRIGEMINAL NEURALGIA: ICD-10-CM

## 2023-05-01 DIAGNOSIS — R56.9 SEIZURE (HCC): ICD-10-CM

## 2023-05-01 DIAGNOSIS — G50.0 ANESTHESIA DOLOROSA IN DISTRIBUTION OF TRIGEMINAL NERVE: ICD-10-CM

## 2023-05-01 DIAGNOSIS — R42 VERTIGO: ICD-10-CM

## 2023-05-01 PROCEDURE — 1036F TOBACCO NON-USER: CPT | Performed by: STUDENT IN AN ORGANIZED HEALTH CARE EDUCATION/TRAINING PROGRAM

## 2023-05-01 PROCEDURE — 3017F COLORECTAL CA SCREEN DOC REV: CPT | Performed by: STUDENT IN AN ORGANIZED HEALTH CARE EDUCATION/TRAINING PROGRAM

## 2023-05-01 PROCEDURE — G8417 CALC BMI ABV UP PARAM F/U: HCPCS | Performed by: STUDENT IN AN ORGANIZED HEALTH CARE EDUCATION/TRAINING PROGRAM

## 2023-05-01 PROCEDURE — 1123F ACP DISCUSS/DSCN MKR DOCD: CPT | Performed by: STUDENT IN AN ORGANIZED HEALTH CARE EDUCATION/TRAINING PROGRAM

## 2023-05-01 PROCEDURE — 1090F PRES/ABSN URINE INCON ASSESS: CPT | Performed by: STUDENT IN AN ORGANIZED HEALTH CARE EDUCATION/TRAINING PROGRAM

## 2023-05-01 PROCEDURE — 99214 OFFICE O/P EST MOD 30 MIN: CPT | Performed by: STUDENT IN AN ORGANIZED HEALTH CARE EDUCATION/TRAINING PROGRAM

## 2023-05-01 PROCEDURE — G8427 DOCREV CUR MEDS BY ELIG CLIN: HCPCS | Performed by: STUDENT IN AN ORGANIZED HEALTH CARE EDUCATION/TRAINING PROGRAM

## 2023-05-01 PROCEDURE — 3077F SYST BP >= 140 MM HG: CPT | Performed by: STUDENT IN AN ORGANIZED HEALTH CARE EDUCATION/TRAINING PROGRAM

## 2023-05-01 PROCEDURE — 3079F DIAST BP 80-89 MM HG: CPT | Performed by: STUDENT IN AN ORGANIZED HEALTH CARE EDUCATION/TRAINING PROGRAM

## 2023-05-01 PROCEDURE — G8400 PT W/DXA NO RESULTS DOC: HCPCS | Performed by: STUDENT IN AN ORGANIZED HEALTH CARE EDUCATION/TRAINING PROGRAM

## 2023-05-01 RX ORDER — IBUPROFEN 600 MG/1
600 TABLET ORAL 3 TIMES DAILY PRN
Qty: 30 TABLET | Refills: 0 | Status: CANCELLED | OUTPATIENT
Start: 2023-05-01

## 2023-05-01 RX ORDER — MECLIZINE HCL 12.5 MG/1
12.5 TABLET ORAL 3 TIMES DAILY PRN
Qty: 90 TABLET | Refills: 1 | Status: SHIPPED | OUTPATIENT
Start: 2023-05-01

## 2023-05-01 RX ORDER — BACLOFEN 10 MG/1
10 TABLET ORAL 3 TIMES DAILY PRN
Qty: 90 TABLET | Refills: 2 | Status: SHIPPED | OUTPATIENT
Start: 2023-05-01

## 2023-05-01 RX ORDER — OXCARBAZEPINE 600 MG/1
600 TABLET, FILM COATED ORAL 3 TIMES DAILY
Qty: 270 TABLET | Refills: 1 | Status: SHIPPED | OUTPATIENT
Start: 2023-05-01

## 2023-05-01 RX ORDER — MELOXICAM 7.5 MG/1
7.5 TABLET ORAL 2 TIMES DAILY PRN
Qty: 60 TABLET | Refills: 2 | Status: SHIPPED | OUTPATIENT
Start: 2023-05-01

## 2023-05-01 RX ORDER — METOPROLOL SUCCINATE 25 MG/1
25 TABLET, EXTENDED RELEASE ORAL DAILY
Qty: 90 TABLET | Refills: 1 | Status: CANCELLED | OUTPATIENT
Start: 2023-05-01

## 2023-05-01 SDOH — ECONOMIC STABILITY: INCOME INSECURITY: HOW HARD IS IT FOR YOU TO PAY FOR THE VERY BASICS LIKE FOOD, HOUSING, MEDICAL CARE, AND HEATING?: NOT HARD AT ALL

## 2023-05-01 SDOH — ECONOMIC STABILITY: HOUSING INSECURITY
IN THE LAST 12 MONTHS, WAS THERE A TIME WHEN YOU DID NOT HAVE A STEADY PLACE TO SLEEP OR SLEPT IN A SHELTER (INCLUDING NOW)?: NO

## 2023-05-01 SDOH — ECONOMIC STABILITY: FOOD INSECURITY: WITHIN THE PAST 12 MONTHS, YOU WORRIED THAT YOUR FOOD WOULD RUN OUT BEFORE YOU GOT MONEY TO BUY MORE.: NEVER TRUE

## 2023-05-01 SDOH — ECONOMIC STABILITY: FOOD INSECURITY: WITHIN THE PAST 12 MONTHS, THE FOOD YOU BOUGHT JUST DIDN'T LAST AND YOU DIDN'T HAVE MONEY TO GET MORE.: NEVER TRUE

## 2023-05-01 ASSESSMENT — ENCOUNTER SYMPTOMS: SHORTNESS OF BREATH: 0

## 2023-05-01 NOTE — ASSESSMENT & PLAN NOTE
Chronic, partially controlled, worsened by acute hip pain  Following with neurology  Continue oxcarbazepine 600 mg 3 times daily, gabapentin 800 mg 3 times daily

## 2023-05-01 NOTE — ASSESSMENT & PLAN NOTE
Severe, significantly worsened since XR 6/2022  Referral to ortho Dr. Gumaro Guevara scheduled 5/17  Change ibuprofen to meloxicam 7.5 mg twice daily with food  Increase Baclofen 10 mg to 3 times daily as needed  Do not feel opiates would be safe given patient's history of dizziness and unsteadiness  Discussed proper use of cane to prevent falls  Discussed utilizing ortho walk-in if pain remains severe, not improving

## 2023-05-01 NOTE — PATIENT INSTRUCTIONS
Stop ibuprofen. Take meloxicam with food as needed. If no improvement, recommend ortho walk-in clinic in [place]. Call with blood pressures at the end of this week.

## 2023-05-01 NOTE — ASSESSMENT & PLAN NOTE
Chronic, previously controlled, suspect elevated today due to pain and ibuprofen use  Changing medication for pain control, patient to call with BP update end of the week to ensure improving  Continue Toprol XL 25 mg daily  Consider restarting lisinopril if remains elevated

## 2023-05-01 NOTE — PROGRESS NOTES
ESTABLISHED PRIMARY CARE VISIT    23  Name: Lilia Barrios   : 1955   Age: 74 y.o.   Sex: female        Assessment & Plan:     Problem List Items Addressed This Visit          Circulatory    Hypertension     Chronic, previously controlled, suspect elevated today due to pain and ibuprofen use  Changing medication for pain control, patient to call with BP update end of the week to ensure improving  Continue Toprol XL 25 mg daily  Consider restarting lisinopril if remains elevated            Nervous and Auditory    Trigeminal neuralgia     Chronic, partially controlled, worsened by acute hip pain  Following with neurology  Continue oxcarbazepine 600 mg 3 times daily, gabapentin 800 mg 3 times daily         Relevant Medications    OXcarbazepine (TRILEPTAL) 600 MG tablet    baclofen (LIORESAL) 10 MG tablet       Musculoskeletal and Integument    Primary osteoarthritis of right hip - Primary     Severe, significantly worsened since XR 2022  Referral to ortho Dr. Shae Neff scheduled   Change ibuprofen to meloxicam 7.5 mg twice daily with food  Increase Baclofen 10 mg to 3 times daily as needed  Do not feel opiates would be safe given patient's age and history of dizziness and unsteadiness  Discussed proper use of cane to prevent falls  Discussed utilizing ortho walk-in if pain remains severe, not improving         Relevant Medications    meloxicam (MOBIC) 7.5 MG tablet    baclofen (LIORESAL) 10 MG tablet       Other    Vertigo     Chronic, likely secondary to trigeminal neuralgia  Following with neurology  Continue meclizine as needed         Relevant Medications    meclizine (ANTIVERT) 12.5 MG tablet    Seizure (HCC)     Chronic, controlled  Associated with trigeminal neuralgia per patient  Following with neurology  Continue oxcarbazepine 600 mg 3 times daily, gabapentin 800 mg 3 times daily         Relevant Medications    OXcarbazepine (TRILEPTAL) 600 MG tablet    Anesthesia dolorosa in

## 2023-05-01 NOTE — ASSESSMENT & PLAN NOTE
Chronic, likely secondary to trigeminal neuralgia  Following with neurology  Continue meclizine as needed

## 2023-05-08 ENCOUNTER — TELEPHONE (OUTPATIENT)
Dept: PRIMARY CARE CLINIC | Age: 68
End: 2023-05-08

## 2023-05-08 NOTE — TELEPHONE ENCOUNTER
Pt called in stated she forgot to call in on Friday to let you know what her bp has been. She stated that they have been running around the same. Today it was 198/102 few mins ago. That is the highest.  Pt would not tell me any  more blood pressures. I tried to get her to tell me any other readings. She states she has been really dizzy all day and her face is on fire.

## 2023-05-08 NOTE — TELEPHONE ENCOUNTER
Attempted to call patient. LVM to return call. Recommend restarting lisinopril at 20 mg daily as was previously on lisinopril. Will await patient's call.

## 2023-05-09 ENCOUNTER — APPOINTMENT (OUTPATIENT)
Dept: GENERAL RADIOLOGY | Age: 68
DRG: 305 | End: 2023-05-09
Payer: COMMERCIAL

## 2023-05-09 ENCOUNTER — APPOINTMENT (OUTPATIENT)
Dept: CT IMAGING | Age: 68
DRG: 305 | End: 2023-05-09
Payer: COMMERCIAL

## 2023-05-09 ENCOUNTER — OFFICE VISIT (OUTPATIENT)
Dept: FAMILY MEDICINE CLINIC | Age: 68
End: 2023-05-09
Payer: COMMERCIAL

## 2023-05-09 ENCOUNTER — HOSPITAL ENCOUNTER (INPATIENT)
Age: 68
LOS: 2 days | Discharge: HOME | DRG: 305 | End: 2023-05-11
Attending: EMERGENCY MEDICINE | Admitting: INTERNAL MEDICINE
Payer: COMMERCIAL

## 2023-05-09 VITALS
OXYGEN SATURATION: 98 % | HEIGHT: 63 IN | DIASTOLIC BLOOD PRESSURE: 118 MMHG | BODY MASS INDEX: 24.98 KG/M2 | WEIGHT: 141 LBS | SYSTOLIC BLOOD PRESSURE: 228 MMHG | HEART RATE: 105 BPM | RESPIRATION RATE: 20 BRPM | TEMPERATURE: 98.8 F

## 2023-05-09 DIAGNOSIS — R23.2 FLUSHING: ICD-10-CM

## 2023-05-09 DIAGNOSIS — K58.1 IRRITABLE BOWEL SYNDROME WITH CONSTIPATION: ICD-10-CM

## 2023-05-09 DIAGNOSIS — H53.8 BLURRED VISION: ICD-10-CM

## 2023-05-09 DIAGNOSIS — R42 DIZZINESS: ICD-10-CM

## 2023-05-09 DIAGNOSIS — I16.0 HYPERTENSIVE URGENCY: Primary | ICD-10-CM

## 2023-05-09 DIAGNOSIS — K21.9 GASTROESOPHAGEAL REFLUX DISEASE, UNSPECIFIED WHETHER ESOPHAGITIS PRESENT: ICD-10-CM

## 2023-05-09 PROBLEM — I16.1 HYPERTENSIVE EMERGENCY: Status: ACTIVE | Noted: 2023-05-09

## 2023-05-09 LAB
ANION GAP SERPL CALCULATED.3IONS-SCNC: 11 MMOL/L (ref 7–16)
BASOPHILS # BLD: 0.03 E9/L (ref 0–0.2)
BASOPHILS NFR BLD: 0.5 % (ref 0–2)
BUN SERPL-MCNC: 20 MG/DL (ref 6–23)
CALCIUM SERPL-MCNC: 9.5 MG/DL (ref 8.6–10.2)
CHLORIDE SERPL-SCNC: 94 MMOL/L (ref 98–107)
CO2 SERPL-SCNC: 23 MMOL/L (ref 22–29)
CREAT SERPL-MCNC: 0.5 MG/DL (ref 0.5–1)
EKG ATRIAL RATE: 79 BPM
EKG P AXIS: 71 DEGREES
EKG P-R INTERVAL: 144 MS
EKG Q-T INTERVAL: 376 MS
EKG QRS DURATION: 96 MS
EKG QTC CALCULATION (BAZETT): 431 MS
EKG R AXIS: 69 DEGREES
EKG T AXIS: 54 DEGREES
EKG VENTRICULAR RATE: 79 BPM
EOSINOPHIL # BLD: 0.03 E9/L (ref 0.05–0.5)
EOSINOPHIL NFR BLD: 0.5 % (ref 0–6)
ERYTHROCYTE [DISTWIDTH] IN BLOOD BY AUTOMATED COUNT: 12.4 FL (ref 11.5–15)
GLUCOSE SERPL-MCNC: 91 MG/DL (ref 74–99)
HCT VFR BLD AUTO: 40.2 % (ref 34–48)
HGB BLD-MCNC: 13.4 G/DL (ref 11.5–15.5)
IMM GRANULOCYTES # BLD: 0.01 E9/L
IMM GRANULOCYTES NFR BLD: 0.2 % (ref 0–5)
LYMPHOCYTES # BLD: 1.56 E9/L (ref 1.5–4)
LYMPHOCYTES NFR BLD: 25 % (ref 20–42)
MCH RBC QN AUTO: 30.1 PG (ref 26–35)
MCHC RBC AUTO-ENTMCNC: 33.3 % (ref 32–34.5)
MCV RBC AUTO: 90.3 FL (ref 80–99.9)
MONOCYTES # BLD: 0.49 E9/L (ref 0.1–0.95)
MONOCYTES NFR BLD: 7.8 % (ref 2–12)
NEUTROPHILS # BLD: 4.13 E9/L (ref 1.8–7.3)
NEUTS SEG NFR BLD: 66 % (ref 43–80)
PLATELET # BLD AUTO: 321 E9/L (ref 130–450)
PMV BLD AUTO: 8.6 FL (ref 7–12)
POTASSIUM SERPL-SCNC: 5 MMOL/L (ref 3.5–5)
RBC # BLD AUTO: 4.45 E12/L (ref 3.5–5.5)
REASON FOR REJECTION: NORMAL
REJECTED TEST: NORMAL
SODIUM SERPL-SCNC: 128 MMOL/L (ref 132–146)
TROPONIN, HIGH SENSITIVITY: <6 NG/L (ref 0–9)
WBC # BLD: 6.3 E9/L (ref 4.5–11.5)

## 2023-05-09 PROCEDURE — 84484 ASSAY OF TROPONIN QUANT: CPT

## 2023-05-09 PROCEDURE — 1036F TOBACCO NON-USER: CPT | Performed by: PHYSICIAN ASSISTANT

## 2023-05-09 PROCEDURE — G8420 CALC BMI NORM PARAMETERS: HCPCS | Performed by: PHYSICIAN ASSISTANT

## 2023-05-09 PROCEDURE — 71046 X-RAY EXAM CHEST 2 VIEWS: CPT

## 2023-05-09 PROCEDURE — 6360000002 HC RX W HCPCS: Performed by: INTERNAL MEDICINE

## 2023-05-09 PROCEDURE — G8427 DOCREV CUR MEDS BY ELIG CLIN: HCPCS | Performed by: PHYSICIAN ASSISTANT

## 2023-05-09 PROCEDURE — 99233 SBSQ HOSP IP/OBS HIGH 50: CPT | Performed by: INTERNAL MEDICINE

## 2023-05-09 PROCEDURE — 3077F SYST BP >= 140 MM HG: CPT | Performed by: PHYSICIAN ASSISTANT

## 2023-05-09 PROCEDURE — 2060000000 HC ICU INTERMEDIATE R&B

## 2023-05-09 PROCEDURE — 70450 CT HEAD/BRAIN W/O DYE: CPT

## 2023-05-09 PROCEDURE — 2500000003 HC RX 250 WO HCPCS: Performed by: NURSE PRACTITIONER

## 2023-05-09 PROCEDURE — 6370000000 HC RX 637 (ALT 250 FOR IP): Performed by: STUDENT IN AN ORGANIZED HEALTH CARE EDUCATION/TRAINING PROGRAM

## 2023-05-09 PROCEDURE — 1090F PRES/ABSN URINE INCON ASSESS: CPT | Performed by: PHYSICIAN ASSISTANT

## 2023-05-09 PROCEDURE — 93005 ELECTROCARDIOGRAM TRACING: CPT | Performed by: NURSE PRACTITIONER

## 2023-05-09 PROCEDURE — 99285 EMERGENCY DEPT VISIT HI MDM: CPT

## 2023-05-09 PROCEDURE — 2580000003 HC RX 258: Performed by: INTERNAL MEDICINE

## 2023-05-09 PROCEDURE — 1123F ACP DISCUSS/DSCN MKR DOCD: CPT | Performed by: PHYSICIAN ASSISTANT

## 2023-05-09 PROCEDURE — 3017F COLORECTAL CA SCREEN DOC REV: CPT | Performed by: PHYSICIAN ASSISTANT

## 2023-05-09 PROCEDURE — 3080F DIAST BP >= 90 MM HG: CPT | Performed by: PHYSICIAN ASSISTANT

## 2023-05-09 PROCEDURE — 80048 BASIC METABOLIC PNL TOTAL CA: CPT

## 2023-05-09 PROCEDURE — 99215 OFFICE O/P EST HI 40 MIN: CPT | Performed by: PHYSICIAN ASSISTANT

## 2023-05-09 PROCEDURE — 85025 COMPLETE CBC W/AUTO DIFF WBC: CPT

## 2023-05-09 PROCEDURE — 6360000002 HC RX W HCPCS: Performed by: STUDENT IN AN ORGANIZED HEALTH CARE EDUCATION/TRAINING PROGRAM

## 2023-05-09 PROCEDURE — 96374 THER/PROPH/DIAG INJ IV PUSH: CPT

## 2023-05-09 PROCEDURE — G8400 PT W/DXA NO RESULTS DOC: HCPCS | Performed by: PHYSICIAN ASSISTANT

## 2023-05-09 PROCEDURE — 6370000000 HC RX 637 (ALT 250 FOR IP): Performed by: INTERNAL MEDICINE

## 2023-05-09 PROCEDURE — 93010 ELECTROCARDIOGRAM REPORT: CPT | Performed by: INTERNAL MEDICINE

## 2023-05-09 RX ORDER — POLYETHYLENE GLYCOL 3350 17 G/17G
17 POWDER, FOR SOLUTION ORAL DAILY PRN
Status: DISCONTINUED | OUTPATIENT
Start: 2023-05-09 | End: 2023-05-11 | Stop reason: HOSPADM

## 2023-05-09 RX ORDER — GABAPENTIN 400 MG/1
800 CAPSULE ORAL 3 TIMES DAILY
Status: DISCONTINUED | OUTPATIENT
Start: 2023-05-09 | End: 2023-05-11 | Stop reason: HOSPADM

## 2023-05-09 RX ORDER — ONDANSETRON 2 MG/ML
4 INJECTION INTRAMUSCULAR; INTRAVENOUS EVERY 6 HOURS PRN
Status: DISCONTINUED | OUTPATIENT
Start: 2023-05-09 | End: 2023-05-11 | Stop reason: HOSPADM

## 2023-05-09 RX ORDER — MORPHINE SULFATE 2 MG/ML
2 INJECTION, SOLUTION INTRAMUSCULAR; INTRAVENOUS
Status: COMPLETED | OUTPATIENT
Start: 2023-05-09 | End: 2023-05-09

## 2023-05-09 RX ORDER — DICYCLOMINE HCL 20 MG
20 TABLET ORAL 4 TIMES DAILY PRN
COMMUNITY

## 2023-05-09 RX ORDER — SODIUM CHLORIDE 0.9 % (FLUSH) 0.9 %
5-40 SYRINGE (ML) INJECTION PRN
Status: DISCONTINUED | OUTPATIENT
Start: 2023-05-09 | End: 2023-05-11 | Stop reason: HOSPADM

## 2023-05-09 RX ORDER — DOCUSATE SODIUM 100 MG/1
100 CAPSULE, LIQUID FILLED ORAL DAILY
Status: DISCONTINUED | OUTPATIENT
Start: 2023-05-09 | End: 2023-05-11 | Stop reason: HOSPADM

## 2023-05-09 RX ORDER — PANTOPRAZOLE SODIUM 40 MG/1
40 TABLET, DELAYED RELEASE ORAL EVERY MORNING
COMMUNITY

## 2023-05-09 RX ORDER — PANTOPRAZOLE SODIUM 40 MG/1
40 TABLET, DELAYED RELEASE ORAL DAILY
Status: DISCONTINUED | OUTPATIENT
Start: 2023-05-10 | End: 2023-05-11 | Stop reason: HOSPADM

## 2023-05-09 RX ORDER — ENOXAPARIN SODIUM 100 MG/ML
40 INJECTION SUBCUTANEOUS DAILY
Status: DISCONTINUED | OUTPATIENT
Start: 2023-05-09 | End: 2023-05-11 | Stop reason: HOSPADM

## 2023-05-09 RX ORDER — ACETAMINOPHEN 325 MG/1
650 TABLET ORAL EVERY 6 HOURS PRN
Status: CANCELLED | OUTPATIENT
Start: 2023-05-09

## 2023-05-09 RX ORDER — HYDRALAZINE HYDROCHLORIDE 20 MG/ML
10 INJECTION INTRAMUSCULAR; INTRAVENOUS EVERY 6 HOURS PRN
Status: DISCONTINUED | OUTPATIENT
Start: 2023-05-09 | End: 2023-05-11 | Stop reason: HOSPADM

## 2023-05-09 RX ORDER — METOPROLOL SUCCINATE 25 MG/1
25 TABLET, EXTENDED RELEASE ORAL EVERY MORNING
Status: ON HOLD | COMMUNITY
End: 2023-05-11 | Stop reason: HOSPADM

## 2023-05-09 RX ORDER — DICYCLOMINE HYDROCHLORIDE 10 MG/1
20 CAPSULE ORAL EVERY 6 HOURS
Status: DISCONTINUED | OUTPATIENT
Start: 2023-05-09 | End: 2023-05-11 | Stop reason: HOSPADM

## 2023-05-09 RX ORDER — LISINOPRIL 20 MG/1
20 TABLET ORAL DAILY
Status: DISCONTINUED | OUTPATIENT
Start: 2023-05-09 | End: 2023-05-11

## 2023-05-09 RX ORDER — LAMOTRIGINE 25 MG/1
50 TABLET ORAL DAILY
Status: DISCONTINUED | OUTPATIENT
Start: 2023-05-09 | End: 2023-05-11 | Stop reason: HOSPADM

## 2023-05-09 RX ORDER — ACETAMINOPHEN 650 MG/1
650 SUPPOSITORY RECTAL EVERY 6 HOURS PRN
Status: CANCELLED | OUTPATIENT
Start: 2023-05-09

## 2023-05-09 RX ORDER — SODIUM CHLORIDE 0.9 % (FLUSH) 0.9 %
5-40 SYRINGE (ML) INJECTION EVERY 12 HOURS SCHEDULED
Status: DISCONTINUED | OUTPATIENT
Start: 2023-05-09 | End: 2023-05-11 | Stop reason: HOSPADM

## 2023-05-09 RX ORDER — LABETALOL HYDROCHLORIDE 5 MG/ML
10 INJECTION, SOLUTION INTRAVENOUS ONCE
Status: COMPLETED | OUTPATIENT
Start: 2023-05-09 | End: 2023-05-09

## 2023-05-09 RX ORDER — METOPROLOL SUCCINATE 50 MG/1
50 TABLET, EXTENDED RELEASE ORAL 2 TIMES DAILY
Status: DISCONTINUED | OUTPATIENT
Start: 2023-05-09 | End: 2023-05-09

## 2023-05-09 RX ORDER — OXCARBAZEPINE 300 MG/1
600 TABLET, FILM COATED ORAL 3 TIMES DAILY
Status: DISCONTINUED | OUTPATIENT
Start: 2023-05-09 | End: 2023-05-11 | Stop reason: HOSPADM

## 2023-05-09 RX ORDER — ONDANSETRON 4 MG/1
4 TABLET, ORALLY DISINTEGRATING ORAL EVERY 8 HOURS PRN
Status: DISCONTINUED | OUTPATIENT
Start: 2023-05-09 | End: 2023-05-11 | Stop reason: HOSPADM

## 2023-05-09 RX ORDER — BISACODYL 5 MG/1
5 TABLET, DELAYED RELEASE ORAL DAILY PRN
Status: DISCONTINUED | OUTPATIENT
Start: 2023-05-09 | End: 2023-05-11 | Stop reason: HOSPADM

## 2023-05-09 RX ORDER — SODIUM CHLORIDE 9 MG/ML
INJECTION, SOLUTION INTRAVENOUS PRN
Status: DISCONTINUED | OUTPATIENT
Start: 2023-05-09 | End: 2023-05-11 | Stop reason: HOSPADM

## 2023-05-09 RX ORDER — MECLIZINE HCL 12.5 MG/1
12.5 TABLET ORAL 3 TIMES DAILY PRN
Status: DISCONTINUED | OUTPATIENT
Start: 2023-05-09 | End: 2023-05-11 | Stop reason: HOSPADM

## 2023-05-09 RX ORDER — HYDRALAZINE HYDROCHLORIDE 20 MG/ML
10 INJECTION INTRAMUSCULAR; INTRAVENOUS EVERY 6 HOURS PRN
Status: DISCONTINUED | OUTPATIENT
Start: 2023-05-09 | End: 2023-05-09

## 2023-05-09 RX ORDER — BACLOFEN 10 MG/1
10 TABLET ORAL 3 TIMES DAILY PRN
Status: DISCONTINUED | OUTPATIENT
Start: 2023-05-09 | End: 2023-05-11 | Stop reason: HOSPADM

## 2023-05-09 RX ORDER — POLYETHYLENE GLYCOL 3350 17 G/17G
17 POWDER, FOR SOLUTION ORAL DAILY PRN
COMMUNITY

## 2023-05-09 RX ORDER — AMLODIPINE BESYLATE 5 MG/1
5 TABLET ORAL DAILY
Status: DISCONTINUED | OUTPATIENT
Start: 2023-05-09 | End: 2023-05-11 | Stop reason: HOSPADM

## 2023-05-09 RX ORDER — MELOXICAM 7.5 MG/1
7.5 TABLET ORAL 2 TIMES DAILY PRN
Status: DISCONTINUED | OUTPATIENT
Start: 2023-05-09 | End: 2023-05-10

## 2023-05-09 RX ORDER — TRAZODONE HYDROCHLORIDE 50 MG/1
50 TABLET ORAL NIGHTLY PRN
Status: DISCONTINUED | OUTPATIENT
Start: 2023-05-09 | End: 2023-05-11 | Stop reason: HOSPADM

## 2023-05-09 RX ADMIN — ENOXAPARIN SODIUM 40 MG: 100 INJECTION SUBCUTANEOUS at 17:14

## 2023-05-09 RX ADMIN — DICYCLOMINE HYDROCHLORIDE 20 MG: 10 CAPSULE ORAL at 16:37

## 2023-05-09 RX ADMIN — LABETALOL HYDROCHLORIDE 10 MG: 5 INJECTION, SOLUTION INTRAVENOUS at 13:14

## 2023-05-09 RX ADMIN — HYDRALAZINE HYDROCHLORIDE 10 MG: 20 INJECTION INTRAMUSCULAR; INTRAVENOUS at 16:39

## 2023-05-09 RX ADMIN — LAMOTRIGINE 50 MG: 25 TABLET ORAL at 21:38

## 2023-05-09 RX ADMIN — ONDANSETRON 4 MG: 2 INJECTION INTRAMUSCULAR; INTRAVENOUS at 16:58

## 2023-05-09 RX ADMIN — DICYCLOMINE HYDROCHLORIDE 20 MG: 10 CAPSULE ORAL at 21:38

## 2023-05-09 RX ADMIN — GABAPENTIN 800 MG: 400 CAPSULE ORAL at 16:37

## 2023-05-09 RX ADMIN — OXCARBAZEPINE 600 MG: 300 TABLET, FILM COATED ORAL at 16:56

## 2023-05-09 RX ADMIN — DOCUSATE SODIUM 100 MG: 100 CAPSULE, LIQUID FILLED ORAL at 16:37

## 2023-05-09 RX ADMIN — GABAPENTIN 800 MG: 400 CAPSULE ORAL at 21:38

## 2023-05-09 RX ADMIN — Medication 10 ML: at 21:40

## 2023-05-09 RX ADMIN — OXCARBAZEPINE 600 MG: 300 TABLET, FILM COATED ORAL at 21:38

## 2023-05-09 RX ADMIN — MORPHINE SULFATE 2 MG: 2 INJECTION, SOLUTION INTRAMUSCULAR; INTRAVENOUS at 16:38

## 2023-05-09 RX ADMIN — LABETALOL HYDROCHLORIDE 10 MG: 5 INJECTION, SOLUTION INTRAVENOUS at 15:55

## 2023-05-09 ASSESSMENT — PAIN SCALES - GENERAL
PAINLEVEL_OUTOF10: 8

## 2023-05-09 ASSESSMENT — LIFESTYLE VARIABLES
HOW OFTEN DO YOU HAVE A DRINK CONTAINING ALCOHOL: NEVER
HOW MANY STANDARD DRINKS CONTAINING ALCOHOL DO YOU HAVE ON A TYPICAL DAY: PATIENT DOES NOT DRINK

## 2023-05-09 ASSESSMENT — PAIN DESCRIPTION - LOCATION
LOCATION: FACE;HIP
LOCATION: HEAD
LOCATION: HEAD

## 2023-05-09 ASSESSMENT — PAIN DESCRIPTION - PAIN TYPE: TYPE: ACUTE PAIN

## 2023-05-09 ASSESSMENT — ENCOUNTER SYMPTOMS
DIARRHEA: 0
ABDOMINAL PAIN: 0
SINUS PRESSURE: 1
SHORTNESS OF BREATH: 0
COUGH: 0
NAUSEA: 0
SORE THROAT: 0
PHOTOPHOBIA: 1
SHORTNESS OF BREATH: 0
VOMITING: 0
VOMITING: 0
ABDOMINAL PAIN: 0
DIARRHEA: 0
CHEST TIGHTNESS: 0
NAUSEA: 1
CONSTIPATION: 0
PHOTOPHOBIA: 0
BACK PAIN: 0
COUGH: 0
EYE PAIN: 1
RHINORRHEA: 0
EYE REDNESS: 1

## 2023-05-09 ASSESSMENT — PAIN DESCRIPTION - DESCRIPTORS: DESCRIPTORS: ACHING;SORE

## 2023-05-09 ASSESSMENT — PAIN - FUNCTIONAL ASSESSMENT
PAIN_FUNCTIONAL_ASSESSMENT: 0-10
PAIN_FUNCTIONAL_ASSESSMENT: PREVENTS OR INTERFERES WITH ALL ACTIVE AND SOME PASSIVE ACTIVITIES
PAIN_FUNCTIONAL_ASSESSMENT: 0-10

## 2023-05-09 ASSESSMENT — PAIN DESCRIPTION - ORIENTATION: ORIENTATION: LEFT

## 2023-05-09 NOTE — ED NOTES
ED to Inpatient Handoff Report    Notified floor that electronic handoff available and patient ready for transport to room 405. Safety Risks: Risk of falls    Patient in Restraints: no    Constant Observer or Patient : no    Telemetry Monitoring Ordered: Yes          Order to transfer to unit without monitor: NO    Last MEWS: 3 Time completed: 1547    Vitals:    05/09/23 1314 05/09/23 1329 05/09/23 1403 05/09/23 1548   BP: (!) 188/98 (!) 168/93 (!) 180/81 (!) 202/96   Pulse: 75 82 75 78   Resp:  16 16 16   Temp:    98.2 °F (36.8 °C)   TempSrc:       SpO2:  94% 97% 98%   Weight:       Height:           Opportunity for questions and clarification was provided.        Ash Davis RN  05/09/23 1543

## 2023-05-09 NOTE — PROGRESS NOTES
Take 1 tablet by mouth 3 times daily for 180 days. , Disp: 270 tablet, Rfl: 1    traZODone (DESYREL) 50 MG tablet, Take 1 tablet by mouth nightly as needed for Sleep, Disp: 90 tablet, Rfl: 1    pantoprazole (PROTONIX) 40 MG tablet, Take 1 tablet by mouth in the morning., Disp: 90 tablet, Rfl: 1    Allergies: Allergies   Allergen Reactions    Tylenol [Acetaminophen] Nausea And Vomiting       Social History:     Social History     Tobacco Use    Smoking status: Never    Smokeless tobacco: Never   Vaping Use    Vaping Use: Never used   Substance Use Topics    Alcohol use: No    Drug use: No       Patient lives at home. Physical Exam:     Vitals:    05/09/23 1058 05/09/23 1103   BP: (!) 228/120 (!) 228/118   Pulse: (!) 105    Resp: 20    Temp: 98.8 °F (37.1 °C)    TempSrc: Temporal    SpO2: 98%    Weight: 141 lb (64 kg)    Height: 5' 3\" (1.6 m)        Exam:  Physical Exam  Vitals and nursing note reviewed. Constitutional:       Appearance: She is well-developed. She is ill-appearing. Comments: Patient is flushed. HENT:      Head: Normocephalic and atraumatic. Right Ear: Tympanic membrane normal.      Left Ear: Tympanic membrane normal.      Nose: Nose normal.      Mouth/Throat:      Mouth: Mucous membranes are moist.      Pharynx: Oropharynx is clear. Eyes:      Conjunctiva/sclera: Conjunctivae normal.      Pupils: Pupils are equal, round, and reactive to light. Cardiovascular:      Rate and Rhythm: Normal rate and regular rhythm. Pulmonary:      Effort: Pulmonary effort is normal. No respiratory distress. Breath sounds: Normal breath sounds. Abdominal:      General: Bowel sounds are normal.      Palpations: Abdomen is soft. Tenderness: There is no abdominal tenderness. Musculoskeletal:         General: Normal range of motion. Cervical back: Normal range of motion. No rigidity. Lymphadenopathy:      Cervical: No cervical adenopathy.    Skin:     General: Skin is warm and

## 2023-05-09 NOTE — H&P
trigeminal and hip pain. -Goal is due to blood pressure by 10-20% over the first hour and 25% total over the first 24 hours which would be a target of   ----------Target BP < 170/90 within 24 hours  -Hydralazine, IV, q6hr PRN for SBP > 160 or DBP > 90  -Consider starting dual PO antihyhpertensives, would suggest amlodipine and lisinopril as kidney function is normal but she is hyponatremic so would avoid diuretics  -Continue metoprolol XL, 50mg, BID  -Daily CBC and CMP    Trigeminal neuralgia  Currently with frequent exacerbations.  -Baclofen, 10mg, TID PRN  -Meloxicam, 7.5mg, BID PRN  -Gabapentin, 800mg, TID  -Oxcarbazepine, 600mg, TID    Depression  History of suicide attempt.   -Trazodone, 50mg, qHS PRN  -Monitor mood symptoms    Constipation  -Dicyclomine, 20mg, q6hrs  -Docusate, 100mg, qDaily  -Monitor mood symptoms    GERD  -Pantoprazole, 40mg, qDaily      DVT ppx: Lovenox  GI ppx: Protonix 40 mg daily  Code Status: Full  Diet: General diet      Case discussed with attending Dr. Francesco Ford MD, PhD   [facility] Medicine Resident Physician PGY-2  5/9/2023

## 2023-05-09 NOTE — ED PROVIDER NOTES
Shared ANABELL-ED Attending Visit. CC: No    .   [facility]  Department of Emergency Medicine   ED  Encounter Note  Admit Date/RoomTime: 2023 12:10 PM  ED Room:     NAME: Ary Corona  : 1955  MRN: [id number]     Chief Complaint:  Other (HTN) and Dizziness (Pt reports HTN x 1 week, with blurry vision and HA. Pt has been taking her regular meds but remains sustained )    History of Present Illness       Ary Corona is a 74 y.o. old female who presents to the emergency department by ambulance, for evaluation of high blood pressure, which began several day(s) prior to arrival.  Since onset the symptoms have been persistent and worsening. She has a prior history of hypertension. Severity:  How High: 228/110. Symptoms: Moderate. Current Pregnancy:  NA.  HTN Treatment: compliant. Recent Use of:  No OTC or illegal substances. Associated Signs & Symptoms:    []   Abdominal Pain     []   Back Pain     []   Hematuria     []   Chest Pain     []   Shortness of Breath     []   Palpitations     [x]   Headache     [x]   Dizziness     [x]   Blurred Vision     . ROS   Pertinent positives and negatives are stated within HPI, all other systems reviewed and are negative. Past Medical History:  has a past medical history of Acid reflux, Depression, History of suicide attempt, Ingestion of substance, and Trigeminal neuralgia. Surgical History:  has a past surgical history that includes brain surgery; facial nerve decompression; Total abdominal hysterectomy w/ bilateral salpingoophorectomy; and Carpal tunnel release (Bilateral). Social History:  reports that she has never smoked. She has never used smokeless tobacco. She reports that she does not drink alcohol and does not use drugs. Family History: family history includes Blindness in her son;  Heart Disease in her father and mother; No Known Problems in her brother, brother, sister, sister, and sister; Premature Birth in her

## 2023-05-10 PROBLEM — H53.8 BLURRY VISION: Status: ACTIVE | Noted: 2023-05-10

## 2023-05-10 LAB
ALBUMIN SERPL-MCNC: 4.3 G/DL (ref 3.5–5.2)
ALP SERPL-CCNC: 112 U/L (ref 35–104)
ALT SERPL-CCNC: 9 U/L (ref 0–32)
ANION GAP SERPL CALCULATED.3IONS-SCNC: 11 MMOL/L (ref 7–16)
AST SERPL-CCNC: 13 U/L (ref 0–31)
BILIRUB SERPL-MCNC: 0.4 MG/DL (ref 0–1.2)
BUN SERPL-MCNC: 20 MG/DL (ref 6–23)
CALCIUM SERPL-MCNC: 9.5 MG/DL (ref 8.6–10.2)
CHLORIDE SERPL-SCNC: 97 MMOL/L (ref 98–107)
CO2 SERPL-SCNC: 22 MMOL/L (ref 22–29)
CREAT SERPL-MCNC: 0.5 MG/DL (ref 0.5–1)
ERYTHROCYTE [DISTWIDTH] IN BLOOD BY AUTOMATED COUNT: 12.6 FL (ref 11.5–15)
GLUCOSE SERPL-MCNC: 103 MG/DL (ref 74–99)
HCT VFR BLD AUTO: 35.1 % (ref 34–48)
HGB BLD-MCNC: 11.7 G/DL (ref 11.5–15.5)
MCH RBC QN AUTO: 30 PG (ref 26–35)
MCHC RBC AUTO-ENTMCNC: 33.3 % (ref 32–34.5)
MCV RBC AUTO: 90 FL (ref 80–99.9)
PLATELET # BLD AUTO: 307 E9/L (ref 130–450)
PMV BLD AUTO: 8.4 FL (ref 7–12)
POTASSIUM SERPL-SCNC: 4.2 MMOL/L (ref 3.5–5)
PROT SERPL-MCNC: 7.3 G/DL (ref 6.4–8.3)
RBC # BLD AUTO: 3.9 E12/L (ref 3.5–5.5)
SODIUM SERPL-SCNC: 130 MMOL/L (ref 132–146)
WBC # BLD: 5.7 E9/L (ref 4.5–11.5)

## 2023-05-10 PROCEDURE — 6360000002 HC RX W HCPCS: Performed by: INTERNAL MEDICINE

## 2023-05-10 PROCEDURE — 6370000000 HC RX 637 (ALT 250 FOR IP): Performed by: INTERNAL MEDICINE

## 2023-05-10 PROCEDURE — 99232 SBSQ HOSP IP/OBS MODERATE 35: CPT | Performed by: INTERNAL MEDICINE

## 2023-05-10 PROCEDURE — 6360000002 HC RX W HCPCS: Performed by: STUDENT IN AN ORGANIZED HEALTH CARE EDUCATION/TRAINING PROGRAM

## 2023-05-10 PROCEDURE — 2060000000 HC ICU INTERMEDIATE R&B

## 2023-05-10 PROCEDURE — 6370000000 HC RX 637 (ALT 250 FOR IP): Performed by: STUDENT IN AN ORGANIZED HEALTH CARE EDUCATION/TRAINING PROGRAM

## 2023-05-10 PROCEDURE — 36415 COLL VENOUS BLD VENIPUNCTURE: CPT

## 2023-05-10 PROCEDURE — 80053 COMPREHEN METABOLIC PANEL: CPT

## 2023-05-10 PROCEDURE — 2580000003 HC RX 258: Performed by: INTERNAL MEDICINE

## 2023-05-10 PROCEDURE — 85027 COMPLETE CBC AUTOMATED: CPT

## 2023-05-10 RX ORDER — KETOROLAC TROMETHAMINE 30 MG/ML
15 INJECTION, SOLUTION INTRAMUSCULAR; INTRAVENOUS EVERY 6 HOURS PRN
Status: DISCONTINUED | OUTPATIENT
Start: 2023-05-10 | End: 2023-05-11

## 2023-05-10 RX ADMIN — HYDRALAZINE HYDROCHLORIDE 10 MG: 20 INJECTION INTRAMUSCULAR; INTRAVENOUS at 05:02

## 2023-05-10 RX ADMIN — DICYCLOMINE HYDROCHLORIDE 20 MG: 10 CAPSULE ORAL at 04:50

## 2023-05-10 RX ADMIN — Medication 10 ML: at 08:37

## 2023-05-10 RX ADMIN — ONDANSETRON 4 MG: 4 TABLET, ORALLY DISINTEGRATING ORAL at 11:19

## 2023-05-10 RX ADMIN — OXCARBAZEPINE 600 MG: 300 TABLET, FILM COATED ORAL at 08:39

## 2023-05-10 RX ADMIN — GABAPENTIN 800 MG: 400 CAPSULE ORAL at 08:38

## 2023-05-10 RX ADMIN — SODIUM CHLORIDE, PRESERVATIVE FREE 10 ML: 5 INJECTION INTRAVENOUS at 16:15

## 2023-05-10 RX ADMIN — AMLODIPINE BESYLATE 5 MG: 5 TABLET ORAL at 08:38

## 2023-05-10 RX ADMIN — DICYCLOMINE HYDROCHLORIDE 20 MG: 10 CAPSULE ORAL at 21:22

## 2023-05-10 RX ADMIN — MELOXICAM 7.5 MG: 7.5 TABLET ORAL at 10:29

## 2023-05-10 RX ADMIN — DICYCLOMINE HYDROCHLORIDE 20 MG: 10 CAPSULE ORAL at 10:22

## 2023-05-10 RX ADMIN — GABAPENTIN 800 MG: 400 CAPSULE ORAL at 14:29

## 2023-05-10 RX ADMIN — Medication 10 ML: at 21:21

## 2023-05-10 RX ADMIN — BISACODYL 5 MG: 5 TABLET, COATED ORAL at 21:25

## 2023-05-10 RX ADMIN — PANTOPRAZOLE SODIUM 40 MG: 40 TABLET, DELAYED RELEASE ORAL at 08:39

## 2023-05-10 RX ADMIN — DOCUSATE SODIUM 100 MG: 100 CAPSULE, LIQUID FILLED ORAL at 08:38

## 2023-05-10 RX ADMIN — DICYCLOMINE HYDROCHLORIDE 20 MG: 10 CAPSULE ORAL at 15:20

## 2023-05-10 RX ADMIN — OXCARBAZEPINE 600 MG: 300 TABLET, FILM COATED ORAL at 14:28

## 2023-05-10 RX ADMIN — KETOROLAC TROMETHAMINE 15 MG: 30 INJECTION, SOLUTION INTRAMUSCULAR; INTRAVENOUS at 15:22

## 2023-05-10 RX ADMIN — ENOXAPARIN SODIUM 40 MG: 100 INJECTION SUBCUTANEOUS at 21:25

## 2023-05-10 RX ADMIN — LISINOPRIL 20 MG: 20 TABLET ORAL at 08:39

## 2023-05-10 RX ADMIN — OXCARBAZEPINE 600 MG: 300 TABLET, FILM COATED ORAL at 21:22

## 2023-05-10 RX ADMIN — KETOROLAC TROMETHAMINE 15 MG: 30 INJECTION, SOLUTION INTRAMUSCULAR; INTRAVENOUS at 21:20

## 2023-05-10 RX ADMIN — TRAZODONE HYDROCHLORIDE 50 MG: 50 TABLET ORAL at 21:25

## 2023-05-10 RX ADMIN — LAMOTRIGINE 50 MG: 25 TABLET ORAL at 08:39

## 2023-05-10 RX ADMIN — GABAPENTIN 800 MG: 400 CAPSULE ORAL at 21:22

## 2023-05-10 ASSESSMENT — PAIN DESCRIPTION - ORIENTATION
ORIENTATION: RIGHT
ORIENTATION: LEFT

## 2023-05-10 ASSESSMENT — PAIN SCALES - GENERAL
PAINLEVEL_OUTOF10: 7
PAINLEVEL_OUTOF10: 8
PAINLEVEL_OUTOF10: 7
PAINLEVEL_OUTOF10: 8

## 2023-05-10 ASSESSMENT — PAIN DESCRIPTION - PAIN TYPE: TYPE: CHRONIC PAIN

## 2023-05-10 ASSESSMENT — PAIN DESCRIPTION - DESCRIPTORS
DESCRIPTORS: ACHING;DISCOMFORT
DESCRIPTORS: ACHING;DISCOMFORT
DESCRIPTORS: STABBING;SHOOTING

## 2023-05-10 ASSESSMENT — PAIN - FUNCTIONAL ASSESSMENT: PAIN_FUNCTIONAL_ASSESSMENT: PREVENTS OR INTERFERES SOME ACTIVE ACTIVITIES AND ADLS

## 2023-05-10 ASSESSMENT — PAIN DESCRIPTION - LOCATION
LOCATION: LEG
LOCATION: HIP
LOCATION: FACE;HIP

## 2023-05-10 NOTE — CARE COORDINATION
prior to admission: None            Current DME:              Type of Home Care services:  None    ADLS  Prior functional level: Independent in ADLs/IADLs  Current functional level: Independent in ADLs/IADLs    PT AM-PAC:   /24  OT AM-PAC:   /24    Family can provide assistance at DC: Yes  Would you like Case Management to discuss the discharge plan with any other family members/significant others, and if so, who? No  Plans to Return to Present Housing: Yes  Other Identified Issues/Barriers to RETURNING to current housing: none  Potential Assistance needed at discharge: N/A            Potential DME:    Patient expects to discharge to: [address] [place] [address] for transportation at discharge:      Financial    Payor: Agnes Goldstein / Plan: [address] / Product Type: *No Product type* /     Does insurance require precert for SNF: Yes    Potential assistance Purchasing Medications: No  Meds-to-Beds request: Yes      [facility], [address] [facility] [address] [id number]  Phone: [phone] Fax: [phone]      Notes:    Factors facilitating achievement of predicted outcomes: Family support, Cooperative, Pleasant, and Has needed Durable Medical Equipment at home    Barriers to discharge: Pain and Lower extremity weakness    Additional Case Management Notes: see above    The Plan for Transition of Care is related to the following treatment goals of Hypertensive urgency [B55.0]    IF APPLICABLE: The Patient and/or patient representative [facility] and her family were provided with a choice of provider and agrees with the discharge plan. Freedom of choice list with basic dialogue that supports the patient's individualized plan of care/goals and shares the quality data associated with the providers was provided to:     Patient Representative Name:       The Patient and/or Patient Representative Agree with the Discharge Plan?

## 2023-05-10 NOTE — ACP (ADVANCE CARE PLANNING)
Advance Care Planning   Healthcare Decision Maker:    Primary Decision Maker: Maksim Caldera - [facility] - [phone]    Click here to complete Healthcare Decision Makers including selection of the Healthcare Decision Maker Relationship (ie \"Primary\"). Today we documented Decision Maker(s) consistent with Legal Next of Kin hierarchy.

## 2023-05-11 ENCOUNTER — TELEPHONE (OUTPATIENT)
Dept: PRIMARY CARE CLINIC | Age: 68
End: 2023-05-11

## 2023-05-11 VITALS
WEIGHT: 149.25 LBS | DIASTOLIC BLOOD PRESSURE: 55 MMHG | RESPIRATION RATE: 16 BRPM | TEMPERATURE: 98.6 F | HEART RATE: 97 BPM | OXYGEN SATURATION: 97 % | SYSTOLIC BLOOD PRESSURE: 139 MMHG | BODY MASS INDEX: 26.45 KG/M2 | HEIGHT: 63 IN

## 2023-05-11 PROBLEM — I16.0 HYPERTENSIVE URGENCY: Status: RESOLVED | Noted: 2023-05-09 | Resolved: 2023-05-11

## 2023-05-11 PROBLEM — H53.8 BLURRY VISION: Status: RESOLVED | Noted: 2023-05-10 | Resolved: 2023-05-11

## 2023-05-11 PROBLEM — I16.1 HYPERTENSIVE EMERGENCY: Status: RESOLVED | Noted: 2023-05-09 | Resolved: 2023-05-11

## 2023-05-11 LAB
ALBUMIN SERPL-MCNC: 3.9 G/DL (ref 3.5–5.2)
ALP SERPL-CCNC: 101 U/L (ref 35–104)
ALT SERPL-CCNC: 7 U/L (ref 0–32)
ANION GAP SERPL CALCULATED.3IONS-SCNC: 10 MMOL/L (ref 7–16)
AST SERPL-CCNC: 11 U/L (ref 0–31)
BACTERIA URNS QL MICRO: ABNORMAL /HPF
BILIRUB SERPL-MCNC: 0.3 MG/DL (ref 0–1.2)
BILIRUB UR QL STRIP: NEGATIVE
BUN SERPL-MCNC: 30 MG/DL (ref 6–23)
CALCIUM SERPL-MCNC: 9.1 MG/DL (ref 8.6–10.2)
CHLORIDE SERPL-SCNC: 95 MMOL/L (ref 98–107)
CLARITY UR: CLEAR
CO2 SERPL-SCNC: 26 MMOL/L (ref 22–29)
COLOR UR: YELLOW
CREAT SERPL-MCNC: 1 MG/DL (ref 0.5–1)
CREAT UR-MCNC: 217 MG/DL (ref 29–226)
ERYTHROCYTE [DISTWIDTH] IN BLOOD BY AUTOMATED COUNT: 12.9 FL (ref 11.5–15)
GLUCOSE SERPL-MCNC: 92 MG/DL (ref 74–99)
GLUCOSE UR STRIP-MCNC: NEGATIVE MG/DL
HCT VFR BLD AUTO: 33.6 % (ref 34–48)
HGB BLD-MCNC: 11.3 G/DL (ref 11.5–15.5)
HGB UR QL STRIP: NEGATIVE
KETONES UR STRIP-MCNC: ABNORMAL MG/DL
LEUKOCYTE ESTERASE UR QL STRIP: ABNORMAL
MCH RBC QN AUTO: 30.7 PG (ref 26–35)
MCHC RBC AUTO-ENTMCNC: 33.6 % (ref 32–34.5)
MCV RBC AUTO: 91.3 FL (ref 80–99.9)
NITRITE UR QL STRIP: NEGATIVE
PH UR STRIP: 5.5 [PH] (ref 5–9)
PLATELET # BLD AUTO: 287 E9/L (ref 130–450)
PMV BLD AUTO: 8.6 FL (ref 7–12)
POTASSIUM SERPL-SCNC: 4.9 MMOL/L (ref 3.5–5)
PROT SERPL-MCNC: 6.7 G/DL (ref 6.4–8.3)
PROT UR STRIP-MCNC: NEGATIVE MG/DL
RBC # BLD AUTO: 3.68 E12/L (ref 3.5–5.5)
RBC #/AREA URNS HPF: ABNORMAL /HPF (ref 0–2)
SODIUM SERPL-SCNC: 131 MMOL/L (ref 132–146)
SODIUM UR-SCNC: <20 MMOL/L
SP GR UR STRIP: 1.02 (ref 1–1.03)
UROBILINOGEN UR STRIP-ACNC: 0.2 E.U./DL
WBC # BLD: 5.7 E9/L (ref 4.5–11.5)
WBC #/AREA URNS HPF: ABNORMAL /HPF (ref 0–5)

## 2023-05-11 PROCEDURE — 36415 COLL VENOUS BLD VENIPUNCTURE: CPT

## 2023-05-11 PROCEDURE — 84300 ASSAY OF URINE SODIUM: CPT

## 2023-05-11 PROCEDURE — 2580000003 HC RX 258: Performed by: STUDENT IN AN ORGANIZED HEALTH CARE EDUCATION/TRAINING PROGRAM

## 2023-05-11 PROCEDURE — 85027 COMPLETE CBC AUTOMATED: CPT

## 2023-05-11 PROCEDURE — 97161 PT EVAL LOW COMPLEX 20 MIN: CPT

## 2023-05-11 PROCEDURE — 6370000000 HC RX 637 (ALT 250 FOR IP): Performed by: INTERNAL MEDICINE

## 2023-05-11 PROCEDURE — 80053 COMPREHEN METABOLIC PANEL: CPT

## 2023-05-11 PROCEDURE — 81001 URINALYSIS AUTO W/SCOPE: CPT

## 2023-05-11 PROCEDURE — 2580000003 HC RX 258: Performed by: INTERNAL MEDICINE

## 2023-05-11 PROCEDURE — 82570 ASSAY OF URINE CREATININE: CPT

## 2023-05-11 PROCEDURE — 6360000002 HC RX W HCPCS: Performed by: STUDENT IN AN ORGANIZED HEALTH CARE EDUCATION/TRAINING PROGRAM

## 2023-05-11 PROCEDURE — 6370000000 HC RX 637 (ALT 250 FOR IP): Performed by: STUDENT IN AN ORGANIZED HEALTH CARE EDUCATION/TRAINING PROGRAM

## 2023-05-11 PROCEDURE — 97165 OT EVAL LOW COMPLEX 30 MIN: CPT

## 2023-05-11 PROCEDURE — 99239 HOSP IP/OBS DSCHRG MGMT >30: CPT | Performed by: INTERNAL MEDICINE

## 2023-05-11 RX ORDER — LISINOPRIL 10 MG/1
10 TABLET ORAL DAILY
Status: DISCONTINUED | OUTPATIENT
Start: 2023-05-12 | End: 2023-05-11

## 2023-05-11 RX ORDER — LISINOPRIL 10 MG/1
10 TABLET ORAL DAILY
Qty: 30 TABLET | Refills: 5 | Status: SHIPPED | OUTPATIENT
Start: 2023-05-11

## 2023-05-11 RX ORDER — LISINOPRIL 10 MG/1
10 TABLET ORAL DAILY
Status: DISCONTINUED | OUTPATIENT
Start: 2023-05-11 | End: 2023-05-11 | Stop reason: HOSPADM

## 2023-05-11 RX ORDER — LAMOTRIGINE 25 MG/1
50 TABLET ORAL DAILY
Qty: 30 TABLET | Refills: 3 | Status: SHIPPED | OUTPATIENT
Start: 2023-05-12

## 2023-05-11 RX ORDER — SODIUM CHLORIDE 9 MG/ML
INJECTION, SOLUTION INTRAVENOUS CONTINUOUS
Status: DISCONTINUED | OUTPATIENT
Start: 2023-05-11 | End: 2023-05-11 | Stop reason: HOSPADM

## 2023-05-11 RX ORDER — AMLODIPINE BESYLATE 5 MG/1
5 TABLET ORAL DAILY
Qty: 30 TABLET | Refills: 3 | Status: SHIPPED | OUTPATIENT
Start: 2023-05-12

## 2023-05-11 RX ADMIN — LISINOPRIL 20 MG: 20 TABLET ORAL at 07:55

## 2023-05-11 RX ADMIN — LISINOPRIL 10 MG: 10 TABLET ORAL at 13:09

## 2023-05-11 RX ADMIN — GABAPENTIN 800 MG: 400 CAPSULE ORAL at 07:54

## 2023-05-11 RX ADMIN — DOCUSATE SODIUM 100 MG: 100 CAPSULE, LIQUID FILLED ORAL at 07:55

## 2023-05-11 RX ADMIN — LAMOTRIGINE 50 MG: 25 TABLET ORAL at 07:54

## 2023-05-11 RX ADMIN — DICYCLOMINE HYDROCHLORIDE 20 MG: 10 CAPSULE ORAL at 10:59

## 2023-05-11 RX ADMIN — DICYCLOMINE HYDROCHLORIDE 20 MG: 10 CAPSULE ORAL at 04:00

## 2023-05-11 RX ADMIN — OXCARBAZEPINE 600 MG: 300 TABLET, FILM COATED ORAL at 07:55

## 2023-05-11 RX ADMIN — OXCARBAZEPINE 600 MG: 300 TABLET, FILM COATED ORAL at 13:09

## 2023-05-11 RX ADMIN — AMLODIPINE BESYLATE 5 MG: 5 TABLET ORAL at 07:55

## 2023-05-11 RX ADMIN — KETOROLAC TROMETHAMINE 15 MG: 30 INJECTION, SOLUTION INTRAMUSCULAR; INTRAVENOUS at 04:00

## 2023-05-11 RX ADMIN — SODIUM CHLORIDE: 9 INJECTION, SOLUTION INTRAVENOUS at 09:47

## 2023-05-11 RX ADMIN — Medication 10 ML: at 07:55

## 2023-05-11 RX ADMIN — GABAPENTIN 800 MG: 400 CAPSULE ORAL at 13:09

## 2023-05-11 RX ADMIN — PANTOPRAZOLE SODIUM 40 MG: 40 TABLET, DELAYED RELEASE ORAL at 07:54

## 2023-05-11 ASSESSMENT — PAIN DESCRIPTION - LOCATION: LOCATION: PELVIS

## 2023-05-11 ASSESSMENT — PAIN SCALES - GENERAL
PAINLEVEL_OUTOF10: 0
PAINLEVEL_OUTOF10: 7

## 2023-05-11 ASSESSMENT — PAIN DESCRIPTION - ORIENTATION: ORIENTATION: RIGHT

## 2023-05-11 NOTE — PLAN OF CARE
[facility] Addendum    I have personally participated in history and physical exam, and have personally and independently reviewed vitals, labs, imaging reports, microbiology studies, and cardiac studies as appropriate. This participation and review was done on the date of service and in addition to that of Bib Renteria MD, resident on service. I have reviewed the documentation of ANABELL, with additional thoughts and/or corrections as follows:    HPI  This patient is a 72-year-old female with past medical history pertinent for hypertension, GERD, fibromyalgia, depression who presented to a local walk-in clinic today with complaints of feeling \"fuzzy,\" shaky, dizzy, with headache, and intermittent blurry vision. Blood pressure recorded there was 228/118 and patient was transported here via EMS. Initial blood pressure here was 188/98, she was treated with IV labetalol twice, most recent blood pressure 180/81. The patient's work-up was otherwise remarkable for moderate hyponatremia at 128. CTH showed no acute pathology. EKG showed an incomplete right bundle pattern and troponin was undetectable. Patient was referred for further evaluation and treatment of her hypertensive urgency. Home Toprol XL 25 mg PO bid stopped in favor of amlodipine 5 and lisinopril 20 mg PO daily. Only 1 BP post treatment recorded and is 117/63. Pt continues to complain of double and blurry vision.     Physical Exam  Vitals: /63   Pulse 81   Temp 97.8 °F (36.6 °C) (Temporal)   Resp 18   Ht 5' 3\" (1.6 m)   Wt 141 lb (64 kg)   SpO2 96%   BMI 24.98 kg/m²   General: well-developed, well-nourished, no acute distress, cooperative  Skin: generally warm, dry, and intact, with normal color  HEENT: normocephalic, atraumatic, no gross abnormalities  Respiratory: clear to auscultation bilaterally without respiratory distress  Cardiovascular: regular rate and rhythm without murmur / rub / gallop  Abdominal: soft,
Problem: Discharge Planning  Goal: Discharge to home or other facility with appropriate resources  5/10/2023 0958 by Jace Leyva RN  Outcome: Progressing  5/9/2023 2253 by Alexandre Renteria RN  Outcome: Progressing     Problem: Pain  Goal: Verbalizes/displays adequate comfort level or baseline comfort level  5/10/2023 0958 by Jace Leyva RN  Outcome: Progressing  5/9/2023 2253 by Alexandre Renteria RN  Outcome: Progressing     Problem: Safety - Adult  Goal: Free from fall injury  5/10/2023 0958 by Jace Leyva RN  Outcome: Progressing  5/9/2023 2253 by Alexandre Renteria RN  Outcome: Progressing
Problem: Discharge Planning  Goal: Discharge to home or other facility with appropriate resources  5/10/2023 2334 by Laura Palencia RN  Outcome: Progressing  5/10/2023 0958 by Kassandra Curry RN  Outcome: Progressing     Problem: Pain  Goal: Verbalizes/displays adequate comfort level or baseline comfort level  5/10/2023 2334 by Laura Palencia RN  Outcome: Progressing  5/10/2023 0958 by Kassandra Curry RN  Outcome: Progressing     Problem: Safety - Adult  Goal: Free from fall injury  5/10/2023 2334 by Laura Palencia RN  Outcome: Progressing  5/10/2023 0958 by Kassandra Curry RN  Outcome: Progressing
Problem: Discharge Planning  Goal: Discharge to home or other facility with appropriate resources  5/11/2023 1303 by Newton Westfall RN  Outcome: Completed
Problem: Discharge Planning  Goal: Discharge to home or other facility with appropriate resources  Outcome: Progressing     Problem: Pain  Goal: Verbalizes/displays adequate comfort level or baseline comfort level  Outcome: Progressing     Problem: Safety - Adult  Goal: Free from fall injury  Outcome: Progressing
and rhythm without murmur / rub / gallop  Abdominal: soft, nontender, nondistended, normoactive bowel sounds  Extremities: no obvious edema or deformity  Neurologic: awake, alert, no gross deficits  Psychiatric: normal affect, cooperative    Assessment / Plan  Hypertensive emergency - 228/118 PTA, 180/81 in ED. Likely due to pain from neuralgia. 14 [facility] no acute pathology. Home Toprol XL switched to amlodipine 5 and lisinopril 10 with BPs now much improved and visual changes almost resolved. From my standpoint ok for dc. 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 Currently on gabapentin and oxcarbazepine, has had several surgeries. In tears due to pain. Lamotrigine added on admission. She has been on a number of medications for this for >20 years. Hyponatremia, moderate - sodium was 128 in the ED, chart review shows this is chronic. Watch BMP. Elevation in Cr - Cr was 0.5 now 1.0 likely due to ACEi; plan to have pt check renal fn post-dc to monitor. Otherwise as per NP's note. Please see orders for further plan of care.     Electronically signed by Duglas Bean DO on 5/11/2023 at 7:58 AM
urgent care, 180/81 here after labetalol, pt reports it is up over past few weeks d/t pain. On Toprol-XL 25 mg at home. Feel due to pain from trigeminal neuralgia (see below). Would switch the metoprolol to amlodipine and lisinopril. Would have a relatively low threshold to involve nephrology for additional recommendations should we not get some control over her blood pressure soon, though I think if we can control her pain we will be ok. Trigeminal neuralgia - longstanding. Currently on gabapentin and oxcarbazepine, has had several surgeries. In tears due to pain. Can try lamotrigine. Perhaps Lyrica as well but would hold off for now. Hyponatremia, moderate - sodium was 128 in the ED, chart review shows this is chronic. Watch BMP. Otherwise as per NP's note. Please see orders for further plan of care.     Electronically signed by Hayden Lockhart DO on 5/9/2023 at 3:23 PM

## 2023-05-11 NOTE — PROGRESS NOTES
CLINICAL PHARMACY NOTE: MEDS TO BEDS    Total # of Prescriptions Filled: 3   The following medications were delivered to the patient:  Lisinopril 10  Lamotrigine 25  Norvasc 5    Additional Documentation:
Database initiated. Patient is A&O from home with . States she ambulates with a cane and is RA at baseline.
Dr. Mario Donahue updated patient nauseous, clammy, sluggish after IV morphine and IV hydralazine. /83. Orders given.
Physical Therapy  Facility/Department: [address] INTERNAL MEDICINE 2  Physical Therapy Initial Assessment    Name: Mat Lopez  : 1955  MRN: [id number]  Date of Service: 2023        Patient Diagnosis(es): The encounter diagnosis was Hypertensive urgency. Past Medical History:  has a past medical history of Acid reflux, Depression, History of suicide attempt, Ingestion of substance, and Trigeminal neuralgia. Past Surgical History:  has a past surgical history that includes brain surgery; facial nerve decompression; Total abdominal hysterectomy w/ bilateral salpingoophorectomy; and Carpal tunnel release (Bilateral). Evaluating Therapist: Krista Bowden PT    Room #:  3973/9938-T  Diagnosis:  Hypertensive urgency [I16.0]  PMHx/PSHx:  Brain surgery, facial nerve decompression, trigeminal neuralgia  Precautions:  falls,    Social:  Pt lives with  in a 2 floor plan sleeps on first floor. Bathroom second floor or in basement. Pt independent with quad cane. Initial Evaluation  Date: 23 Treatment      Short Term/ Long Term   Goals   Was pt agreeable to Eval/treatment? yes     Does pt have pain? Bed Mobility  Rolling: independent  Supine to sit: independent  Sit to supine: independent  Scooting: independent  independent   Transfers Sit to stand: supervision  Stand to sit: supervision  Stand pivot: supervision  independent   Ambulation    200 feet with quad cane and occasional use of hand rail with SBA  200 feet with quad cane independent   Stair Negotiation  Ascended and descended  2 steps with 1 rail with  SBA (limited by IV line)   12 steps with 1 rail independent   LE strength     3+/5    4/5   balance      Fair+     AM-PAC Raw score               20/24         Pt is alert and Oriented   LE ROM: WFL  Sensation: intact  Edema: none  Endurance: good     ASSESSMENT:    Pt displays functional ability as noted in the objective portion of this evaluation.       Patient education  Pt
This patient's CrCl is 52 mL/min. Please consider reducing the gabapentin dose to 600 mg three times daily.   Ellie Crespo RPh 5/11/2023 6:44 AM
dry.      Findings: No rash. Neurological:      General: No focal deficit present. Mental Status: She is alert. Motor: No weakness. Comments: Right hand tremor   Psychiatric:         Mood and Affect: Mood normal.         Behavior: Behavior normal.      Comments: Very anxious       Labs:  Recent Results (from the past 24 hour(s))   SPECIMEN REJECTION    Collection Time: 05/09/23  1:37 PM   Result Value Ref Range    Rejected Test TRP5     Reason for Rejection see below    Troponin    Collection Time: 05/09/23  2:06 PM   Result Value Ref Range    Troponin, High Sensitivity <6 0 - 9 ng/L   CBC    Collection Time: 05/10/23  4:58 AM   Result Value Ref Range    WBC 5.7 4.5 - 11.5 E9/L    RBC 3.90 3.50 - 5.50 E12/L    Hemoglobin 11.7 11.5 - 15.5 g/dL    Hematocrit 35.1 34.0 - 48.0 %    MCV 90.0 80.0 - 99.9 fL    MCH 30.0 26.0 - 35.0 pg    MCHC 33.3 32.0 - 34.5 %    RDW 12.6 11.5 - 15.0 fL    Platelets 970 940 - 334 E9/L    MPV 8.4 7.0 - 12.0 fL   Comprehensive Metabolic Panel    Collection Time: 05/10/23  4:58 AM   Result Value Ref Range    Sodium 130 (L) 132 - 146 mmol/L    Potassium 4.2 3.5 - 5.0 mmol/L    Chloride 97 (L) 98 - 107 mmol/L    CO2 22 22 - 29 mmol/L    Anion Gap 11 7 - 16 mmol/L    Glucose 103 (H) 74 - 99 mg/dL    BUN 20 6 - 23 mg/dL    Creatinine 0.5 0.5 - 1.0 mg/dL    Est, Glom Filt Rate >60 >=60 mL/min/1.73    Calcium 9.5 8.6 - 10.2 mg/dL    Total Protein 7.3 6.4 - 8.3 g/dL    Albumin 4.3 3.5 - 5.2 g/dL    Total Bilirubin 0.4 0.0 - 1.2 mg/dL    Alkaline Phosphatase 112 (H) 35 - 104 U/L    ALT 9 0 - 32 U/L    AST 13 0 - 31 U/L       Radiology and other tests reviewed:  CT Head W/O Contrast   Final Result      No acute intracranial abnormality noted. XR CHEST (2 VW)   Final Result   No acute process.              Assessment:  Active Hospital Problems    Diagnosis Date Noted    Hypertensive urgency [I16.0] 05/09/2023    Hypertensive emergency [I16.1] 05/09/2023
200 with neurologic sequelae. Normal CT head and admission. Likely being exacerbated by uncontrolled pain. Left eye droop and facial twitching during my evaluation likely secondary to trigeminal and hip pain.  -Hydralazine, IV, q6hr PRN for SBP > 180 or DBP > 90  -------- required 1 dose yesterday  -Continue amlodipine, 5 mg, daily; consider increasing to 10mg/daily if permanently stopping lisinopril  -Stop his lisinopril given BRUCE  -Stop NSAIDs given BRUCE  -Heart rate appropriately controlled, continue with holding home metoprolol XL   -Daily CBC and CMP    BRUCE  Baseline Cr = 0.5 historically, was 0.5 on admission  -Cr = 1.0 today  -Likely 2/2 to starting lisinopril and her frequent use of meloxicam/Toradol  -Will stop lisinopril and Toradol at this time  -Gentle fluid hydration as BP has improved  -Urine studies ordered to determine BRUCE etiology     Trigeminal neuralgia  Currently with frequent exacerbations.  -Baclofen, 10mg, TID PRN  -Discontinued Toradol and meloxicam 2/2 BRUCE and pain improvement  -Gabapentin, 800mg, TID  -Oxcarbazepine, 600mg, TID  -Lamotrigine, 50 mg, daily (started this admission)  -May consider changing gabapentin to Lyrica    Right hip pain  -Hold NSAIDs  -PT/OT consulted    Hyponatremia  Sodium was low at 128 on admission  - Na+ = 131 today, slight improvement  -Continue to monitor with daily CMP     Depression  History of suicide attempt.   -Trazodone, 50mg, qHS PRN  -Monitor mood symptoms     Constipation  -Dicyclomine, 20mg, q6hrs  -Docusate, 100mg, qDaily  -Monitor mood symptoms     GERD  -Pantoprazole, 40mg, qDaily      DVT ppx: Lovenox 40 mg daily  GI ppx: Protonix 40 mg daily  Code Status: Full  Diet: General diet      Disposition: Discharge to pending clinical course     Bisi Jackson MD, PhD   Family Medicine Resident PGY-2  05/11/23   11:00 AM
Independent - with use of AE/DME, as needed/appropriate   Toileting SBA  Mod I   Bed Mobility  Supine-to-Sit: Independent  Sit-to-Supine: Independent   N/A   Functional Transfers Sit-to-Stand: SBA   from EOB  Independent   Functional Mobility SBA (with quad cane) for household+ distance within patient's room and hallway. Patient demonstrated preference to hold onto the rail/wall/furniture (with cane in the other hand) during functional mobility. Mod I with functional mobility (with device, as needed/appropriate) in order to maximize independence with ADLs/IADLs and other functional tasks. Balance Sitting: Good (at EOB)  Standing: Fair (with quad cane)  Fair+ dynamic standing balance during completion of ADLs/IADLs and other functional tasks. Activity Tolerance Fair+  Patient will demonstrate Good understanding and consistent implementation of energy conservation techniques and work simplification techniques into ADL/IADL routines. Visual/  Perceptual WFL     N/A   B UE Strength 4-/5  Patient will demonstrate 4+/5 B UE strength in order to maximize independence with ADLs/IADLs and functional transfers. Additional Long-Term Goal: Patient will increase functional independence to PLOF in order to allow patient to live in least restrictive environment. ROM: Additional Information:    R UE  WFL    L UE WFL      Hearing: WFL  Sensation: No c/o numbness/tingling in B UEs. Tone: WFL  Edema: No    Comments: RN approved patient's participation in [address] [place] [address] activities. Upon arrival, patient supine in bed. At end of session, patient supine in bed with call light and phone within reach and all lines and tubes intact. Patient would benefit from continued skilled OT to increase safety and independence with completion of ADL/IADL tasks for functional independence and quality of life.      Patient education provided regardin) potential benefits of obtaining/using a shower chair to maximize safety with showering in home

## 2023-05-11 NOTE — DISCHARGE SUMMARY
[address] - P [phone] Julio C Nava [phone]  [address], [address] [place] [address]      Phone: [phone]   amLODIPine 5 MG tablet  lamoTRIgine 25 MG tablet  lisinopril 10 MG tablet       Activity: activity as tolerated  Diet: regular diet    No follow-up provider specified.     Signed:  Chey Gomez MD  5/11/2023  1:26 PM

## 2023-05-12 ENCOUNTER — TELEPHONE (OUTPATIENT)
Dept: PRIMARY CARE CLINIC | Age: 68
End: 2023-05-12

## 2023-05-12 NOTE — TELEPHONE ENCOUNTER
Care Transitions Initial Follow Up Call    Outreach made within 2 business days of discharge: Yes    Patient: Haylee Hogue Patient : 1955   MRN: [id number]  Reason for Admission: hypertension  Discharge Date: 23       Spoke with: Purvi Caban    Discharge department/facility: [address] [place] [address] Interactive Patient Contact:  Was patient able to fill all prescriptions: Yes  Was patient instructed to bring all medications to the follow-up visit: Yes  Is patient taking all medications as directed in the discharge summary?  Yes  Does patient understand their discharge instructions: Yes  Does patient have questions or concerns that need addressed prior to 7-14 day follow up office visit: yes BP this morning was 193/91 , this afternoon 168/92 HR 94, she would like to know if you want her to go back to the hospital?    Scheduled appointment with PCP within 7-14 days    Follow Up  Future Appointments   Date Time Provider Layton King   2023  1:00 PM Yanci Cuadra MD [facility]   2023 11:00 AM Elvi Suresh MD [facility]   2023  3:30 PM Elvi Suresh MD [address] [place] [address]       Hilary Cloud LPN

## 2023-05-12 NOTE — TELEPHONE ENCOUNTER
Spoke with patient she is not able to come in for a nurse visit, she does not have a ride until after 5.

## 2023-05-12 NOTE — TELEPHONE ENCOUNTER
Recommend she increase her lisinopril to 20 mg daily, can take 2 of the 10 mg she was given. Should call on Monday with blood pressure update or over weekend if not improving.

## 2023-05-15 ENCOUNTER — TELEPHONE (OUTPATIENT)
Dept: PRIMARY CARE CLINIC | Age: 68
End: 2023-05-15

## 2023-05-17 ENCOUNTER — OFFICE VISIT (OUTPATIENT)
Dept: ORTHOPEDIC SURGERY | Age: 68
End: 2023-05-17

## 2023-05-17 ENCOUNTER — TELEPHONE (OUTPATIENT)
Dept: ORTHOPEDIC SURGERY | Age: 68
End: 2023-05-17

## 2023-05-17 VITALS — WEIGHT: 137 LBS | BODY MASS INDEX: 24.27 KG/M2 | HEIGHT: 63 IN

## 2023-05-17 DIAGNOSIS — M25.551 RIGHT HIP PAIN: Primary | ICD-10-CM

## 2023-05-17 NOTE — PATIENT INSTRUCTIONS
UPCOMING ORTHOPAEDIC SURGERY INFORMATION:    PROCEDURE: RIGHT TOTAL HIP ARTHROPLASTY   DATE: 8/14/2023  FACILITY: [address]     Please review your surgical handout and call the office at [phone] with any questions. If requested, please obtain any clearances prior to surgery. It is patients responsibility to notify providers of any clearances needed, if your provider is requesting a form to be sent, please call the office. Expect a call from pre-admission testing the week prior regarding surgery instructions. An authorization request will be obtained by the office for any insurance approval needs. If you are to cancel surgery, please call the office with at least a 72 hour minimum, failure to do so will leave it up to the providers discretion for re-scheduling.

## 2023-05-17 NOTE — PROGRESS NOTES
[facility]   ORTHOPAEDIC SURGERY AND SPORTS MEDICINE  DATE OF VISIT: 05/17/23  New Hip Patient     Referring Provider:   NABOR Lee - NP  [address]  [place],  [address] [place] [address] Drive    CHIEF COMPLAINT:   Chief Complaint   Patient presents with    Hip Pain     Rt hip pain since 6/2022 , worse the past month, denies fall or injury     Results     Rt hip XR - [facility] - did not bring disc - called [facility] - they released imaging into the system     Pain     Rt hip 7 / 10        HPI:      Alysha Pacheco is a 74y.o. year old female who is seen today  for evaluation of right hip pain. Has been present for about a year. It has gotten acutely worse over the past few months. She is having trouble even walking due to pain. She also has trigeminal neuralgia, which is aggravated her hip pain. She has not had any recent treatment for the hip. She does have degenerative change in her lumbar spine as well but no complaints of back pain today. PAST MEDICAL HISTORY  Past Medical History:   Diagnosis Date    Acid reflux     Depression     Self abuse    History of suicide attempt 1/14/2022    Ingestion of substance 1/14/2022    Trigeminal neuralgia 2005    diagnosed       PAST SURGICAL HISTORY  Past Surgical History:   Procedure Laterality Date    BRAIN SURGERY      decompression trigeminal nerve    CARPAL TUNNEL RELEASE Bilateral     FACIAL NERVE DECOMPRESSION      WILMAN AND BSO (CERVIX REMOVED)         FAMILY HISTORY   Family History   Problem Relation Age of Onset    Heart Disease Mother     Heart Disease Father     No Known Problems Sister     No Known Problems Brother     Blindness Son     Premature Birth Son     No Known Problems Sister     No Known Problems Sister     No Known Problems Brother        SOCIAL HISTORY  Social History     Occupational History    Not on file   Tobacco Use    Smoking status: Never    Smokeless tobacco: Never   Vaping Use    Vaping Use: Never used   Substance and Sexual Activity    Alcohol use:  No

## 2023-05-22 ENCOUNTER — TELEPHONE (OUTPATIENT)
Dept: PRIMARY CARE CLINIC | Age: 68
End: 2023-05-22

## 2023-05-22 NOTE — TELEPHONE ENCOUNTER
Please have patient increase lisinopril to 40 mg daily given BP remains above goal. She needs to be seen this week for hospital follow-up and have repeat labs on lisinopril. Please see if patient can find someone to bring her in.

## 2023-05-22 NOTE — TELEPHONE ENCOUNTER
Put patient in on Wednesday at 11:45.  She stated she understood new instruction and She stated she will find a ride

## 2023-05-22 NOTE — TELEPHONE ENCOUNTER
Patient needs to have her appointment rescheduled.  She is supposed to be at the office today at [phone] for a hospital follow

## 2023-05-22 NOTE — TELEPHONE ENCOUNTER
Called and Pt state that she is too dizzy to walk or drive. She stated her blood pressure is 178/90 that is the average.     Please Advise

## 2023-05-23 ENCOUNTER — TELEPHONE (OUTPATIENT)
Dept: ORTHOPEDIC SURGERY | Age: 68
End: 2023-05-23

## 2023-05-23 ENCOUNTER — PREP FOR PROCEDURE (OUTPATIENT)
Dept: ORTHOPEDIC SURGERY | Age: 68
End: 2023-05-23

## 2023-05-24 ENCOUNTER — OFFICE VISIT (OUTPATIENT)
Dept: PRIMARY CARE CLINIC | Age: 68
End: 2023-05-24

## 2023-05-24 VITALS
HEART RATE: 80 BPM | BODY MASS INDEX: 24.8 KG/M2 | WEIGHT: 140 LBS | OXYGEN SATURATION: 99 % | SYSTOLIC BLOOD PRESSURE: 156 MMHG | RESPIRATION RATE: 20 BRPM | DIASTOLIC BLOOD PRESSURE: 80 MMHG | HEIGHT: 63 IN | TEMPERATURE: 97.8 F

## 2023-05-24 DIAGNOSIS — E87.1 HYPONATREMIA: ICD-10-CM

## 2023-05-24 DIAGNOSIS — N17.9 AKI (ACUTE KIDNEY INJURY) (HCC): ICD-10-CM

## 2023-05-24 DIAGNOSIS — I10 PRIMARY HYPERTENSION: ICD-10-CM

## 2023-05-24 DIAGNOSIS — M16.11 PRIMARY OSTEOARTHRITIS OF RIGHT HIP: ICD-10-CM

## 2023-05-24 DIAGNOSIS — Z09 HOSPITAL DISCHARGE FOLLOW-UP: Primary | ICD-10-CM

## 2023-05-24 DIAGNOSIS — R42 VERTIGO: ICD-10-CM

## 2023-05-24 LAB
ANION GAP SERPL CALCULATED.3IONS-SCNC: 16 MMOL/L (ref 7–16)
BUN SERPL-MCNC: 19 MG/DL (ref 6–23)
CALCIUM SERPL-MCNC: 9.6 MG/DL (ref 8.6–10.2)
CHLORIDE SERPL-SCNC: 90 MMOL/L (ref 98–107)
CO2 SERPL-SCNC: 17 MMOL/L (ref 22–29)
CREAT SERPL-MCNC: 1.1 MG/DL (ref 0.5–1)
GLUCOSE SERPL-MCNC: 93 MG/DL (ref 74–99)
POTASSIUM SERPL-SCNC: 5.5 MMOL/L (ref 3.5–5)
SODIUM SERPL-SCNC: 123 MMOL/L (ref 132–146)

## 2023-05-24 RX ORDER — AMLODIPINE BESYLATE 10 MG/1
10 TABLET ORAL DAILY
Qty: 90 TABLET | Refills: 1 | Status: SHIPPED | OUTPATIENT
Start: 2023-05-24

## 2023-05-24 RX ORDER — MELOXICAM 15 MG/1
15 TABLET ORAL DAILY PRN
Qty: 90 TABLET | Refills: 1 | Status: SHIPPED | OUTPATIENT
Start: 2023-05-24

## 2023-05-24 RX ORDER — LISINOPRIL 40 MG/1
40 TABLET ORAL DAILY
Qty: 90 TABLET | Refills: 1 | Status: SHIPPED | OUTPATIENT
Start: 2023-05-24

## 2023-06-05 DIAGNOSIS — I10 PRIMARY HYPERTENSION: Primary | ICD-10-CM

## 2023-06-05 DIAGNOSIS — E87.5 HYPERKALEMIA: ICD-10-CM

## 2023-06-05 DIAGNOSIS — N17.9 AKI (ACUTE KIDNEY INJURY) (HCC): ICD-10-CM

## 2023-06-05 DIAGNOSIS — E87.1 HYPONATREMIA: ICD-10-CM

## 2023-06-05 ASSESSMENT — ENCOUNTER SYMPTOMS: SHORTNESS OF BREATH: 0

## 2023-06-05 NOTE — DISCHARGE INSTRUCTIONS
DISCHARGE INSTRUCTIONS FOR TOTAL HIP REPLACEMENT        Prevent blood clots - you are at risk post operatively for DVT (Deep vein    thrombosis and / or PE (Pulmonary Embolism)       Continue antiembolic stockings (NOELLE hose) for 4 weeks from date of surgery. Remove stockings every eight hours. Check skin for redness or any breakdowns on heels and over outer ankle bones. Do not roll stockings down or fold over (this may cause a band of constriction    interfering with circulation and increasing your risk of a blood clot forming or a    skin breakdown. If you have not been wearing your stockings and notice increased swelling or    excessive swelling in your extremity then: Lie down and elevate legs for 20-30   minutes. Apply stockings. If swelling does not improve, call office. REMEMBER: Mild to moderate swelling of the operative limb is expected    for some time after surgery. Take frequent walks around  your home. Be careful outdoors- watch for uneven ground and pavement, curbs and holes. Gradually increase your activity to prevent fatigue. When at rest (sitting in a chair or lying down,resting) continue circulation                exercises at least every hour - foot flaps, ankle rolls, quad and glut sets. You will continue one of the following blood thinners at home. Lovenox -  An injection once or twice a day if you have a history of blood clots,    cancer, stomach ulcers or gastrointestinal bleeding. Aspirin - 325 mg twice daily if no history of the above ailments. Coumadin - if held before surgery, your family doctor will be responsible for   managing and ordering this medication upon your discharge. Incision Care: You may shower - Your dressing is water proof. You can shower with your dressing on. After one week, remove your dressing and leave your incision open to air if there is no drainage. REMEMBER to let water roll over incision.  Incision line is    healing and

## 2023-06-07 ENCOUNTER — TELEPHONE (OUTPATIENT)
Dept: PRIMARY CARE CLINIC | Age: 68
End: 2023-06-07

## 2023-06-07 NOTE — TELEPHONE ENCOUNTER
----- Message from [facility] sent at 6/7/2023 12:39 PM EDT -----  Subject: Message to Provider    QUESTIONS  Information for Provider? patient stating will not be in today 06/07/23   but WILL BE IN TOMORROW FOR LAB WORK ON 06/08/2023  ---------------------------------------------------------------------------  --------------  Latia STEWARD  [phone]; OK to leave message on voicemail  ---------------------------------------------------------------------------  --------------  SCRIPT ANSWERS  Relationship to Patient?  Self

## 2023-06-08 DIAGNOSIS — N17.9 AKI (ACUTE KIDNEY INJURY) (HCC): ICD-10-CM

## 2023-06-08 DIAGNOSIS — I10 PRIMARY HYPERTENSION: ICD-10-CM

## 2023-06-08 DIAGNOSIS — E87.1 HYPONATREMIA: ICD-10-CM

## 2023-06-08 DIAGNOSIS — E87.5 HYPERKALEMIA: ICD-10-CM

## 2023-06-08 LAB
ANION GAP SERPL CALCULATED.3IONS-SCNC: 10 MMOL/L (ref 7–16)
BUN SERPL-MCNC: 14 MG/DL (ref 6–23)
CALCIUM SERPL-MCNC: 9.3 MG/DL (ref 8.6–10.2)
CHLORIDE SERPL-SCNC: 91 MMOL/L (ref 98–107)
CO2 SERPL-SCNC: 25 MMOL/L (ref 22–29)
CREAT SERPL-MCNC: 0.5 MG/DL (ref 0.5–1)
GLUCOSE SERPL-MCNC: 78 MG/DL (ref 74–99)
POTASSIUM SERPL-SCNC: 4.4 MMOL/L (ref 3.5–5)
SODIUM SERPL-SCNC: 126 MMOL/L (ref 132–146)

## 2023-06-27 ENCOUNTER — TELEPHONE (OUTPATIENT)
Dept: PRIMARY CARE CLINIC | Age: 68
End: 2023-06-27

## 2023-07-19 RX ORDER — PANTOPRAZOLE SODIUM 40 MG/1
40 TABLET, DELAYED RELEASE ORAL EVERY MORNING
Qty: 90 TABLET | Refills: 1 | Status: ON HOLD | OUTPATIENT
Start: 2023-07-19

## 2023-07-21 ENCOUNTER — TELEPHONE (OUTPATIENT)
Dept: PRIMARY CARE CLINIC | Age: 68
End: 2023-07-21

## 2023-07-21 DIAGNOSIS — M16.11 PRIMARY OSTEOARTHRITIS OF RIGHT HIP: Primary | ICD-10-CM

## 2023-07-21 RX ORDER — MENTHOL AND METHYL SALICYLATE 10; 30 G/100G; G/100G
CREAM TOPICAL 4 TIMES DAILY PRN
Qty: 85 G | Refills: 0 | Status: SHIPPED | OUTPATIENT
Start: 2023-07-21

## 2023-07-21 NOTE — TELEPHONE ENCOUNTER
Pt called stated she thought she already had is scheduled and was confused when [facility] scheduled her. She kept the 7/31 apt. Pt also said she uses [facility] and wanted to know if you can prescribe her that?

## 2023-07-21 NOTE — TELEPHONE ENCOUNTER
Last Appointment:  5/24/2023  Future Appointments   Date Time Provider [address]   7/26/2023  9:00 AM Stanford Goodpasture, MD [facility]   7/31/2023  2:30 PM Stanford Goodpasture, MD [facility]   8/9/2023  1:50 PM Ashutosh Braga MD [facility]   8/21/2023  1:40 PM MD SE ERIKA Madden [facility]   10/9/2023  2:00 PM Stanford Goodpasture, MD [facility]        Patient spoken with Dr. Governor Sonia cruzt in three weeks. Patient is asking for a pain relieving ointment to [facility]. Dr. Esthela Souza office is requesting a pre op note from Dr. Ray Angelo.   I did indicate to patient that Dr. Ray Angelo may want her to have an appointment for the pre op exam.    Electronically signed by Nae Beltran LPN on 7/04/7371 at 7:53 AM

## 2023-07-21 NOTE — TELEPHONE ENCOUNTER
Patient has pre-op exam scheduled 7/31. Also has 2 visits at end of July, only needs one of these for pre-op, please confirm if she wants to come in 7/26 or 31.

## 2023-07-21 NOTE — TELEPHONE ENCOUNTER
Also patient takes meloxicam orally. Not sure what pain relieving ointment she is referring to. Would not recommend topical diclofenac and oral meloxicam together.

## 2023-07-31 ENCOUNTER — OFFICE VISIT (OUTPATIENT)
Dept: PRIMARY CARE CLINIC | Age: 68
End: 2023-07-31
Payer: COMMERCIAL

## 2023-07-31 VITALS
WEIGHT: 137 LBS | HEIGHT: 63 IN | HEART RATE: 103 BPM | RESPIRATION RATE: 16 BRPM | BODY MASS INDEX: 24.27 KG/M2 | OXYGEN SATURATION: 98 % | TEMPERATURE: 97 F | DIASTOLIC BLOOD PRESSURE: 80 MMHG | SYSTOLIC BLOOD PRESSURE: 138 MMHG

## 2023-07-31 DIAGNOSIS — E87.1 HYPONATREMIA: ICD-10-CM

## 2023-07-31 DIAGNOSIS — Z01.818 PRE-OPERATIVE GENERAL PHYSICAL EXAMINATION: Primary | ICD-10-CM

## 2023-07-31 DIAGNOSIS — M16.11 PRIMARY OSTEOARTHRITIS OF RIGHT HIP: ICD-10-CM

## 2023-07-31 PROCEDURE — 93000 ELECTROCARDIOGRAM COMPLETE: CPT | Performed by: STUDENT IN AN ORGANIZED HEALTH CARE EDUCATION/TRAINING PROGRAM

## 2023-07-31 PROCEDURE — 3017F COLORECTAL CA SCREEN DOC REV: CPT | Performed by: STUDENT IN AN ORGANIZED HEALTH CARE EDUCATION/TRAINING PROGRAM

## 2023-07-31 PROCEDURE — 3079F DIAST BP 80-89 MM HG: CPT | Performed by: STUDENT IN AN ORGANIZED HEALTH CARE EDUCATION/TRAINING PROGRAM

## 2023-07-31 PROCEDURE — 3075F SYST BP GE 130 - 139MM HG: CPT | Performed by: STUDENT IN AN ORGANIZED HEALTH CARE EDUCATION/TRAINING PROGRAM

## 2023-07-31 PROCEDURE — 1036F TOBACCO NON-USER: CPT | Performed by: STUDENT IN AN ORGANIZED HEALTH CARE EDUCATION/TRAINING PROGRAM

## 2023-07-31 PROCEDURE — G8420 CALC BMI NORM PARAMETERS: HCPCS | Performed by: STUDENT IN AN ORGANIZED HEALTH CARE EDUCATION/TRAINING PROGRAM

## 2023-07-31 PROCEDURE — G8400 PT W/DXA NO RESULTS DOC: HCPCS | Performed by: STUDENT IN AN ORGANIZED HEALTH CARE EDUCATION/TRAINING PROGRAM

## 2023-07-31 PROCEDURE — 99214 OFFICE O/P EST MOD 30 MIN: CPT | Performed by: STUDENT IN AN ORGANIZED HEALTH CARE EDUCATION/TRAINING PROGRAM

## 2023-07-31 PROCEDURE — 1090F PRES/ABSN URINE INCON ASSESS: CPT | Performed by: STUDENT IN AN ORGANIZED HEALTH CARE EDUCATION/TRAINING PROGRAM

## 2023-07-31 PROCEDURE — G8427 DOCREV CUR MEDS BY ELIG CLIN: HCPCS | Performed by: STUDENT IN AN ORGANIZED HEALTH CARE EDUCATION/TRAINING PROGRAM

## 2023-07-31 PROCEDURE — 1123F ACP DISCUSS/DSCN MKR DOCD: CPT | Performed by: STUDENT IN AN ORGANIZED HEALTH CARE EDUCATION/TRAINING PROGRAM

## 2023-07-31 RX ORDER — MELOXICAM 15 MG/1
15 TABLET ORAL DAILY PRN
Qty: 30 TABLET | Refills: 0 | Status: SHIPPED | OUTPATIENT
Start: 2023-07-31

## 2023-07-31 RX ORDER — LISINOPRIL 40 MG/1
40 TABLET ORAL DAILY
COMMUNITY

## 2023-07-31 ASSESSMENT — ENCOUNTER SYMPTOMS: SHORTNESS OF BREATH: 0

## 2023-07-31 NOTE — PROGRESS NOTES
PRE-OPERATIVE ASSESSMENT    23  Name: Peggy Webster   : 1955   Age: 74 y.o. Sex: female        Assessment & Plan:   Peggy Webster is a 74 y.o. female with planned surgery as below. Known risk factors for perioperative complications: None    Difficulty with intubation is not anticipated. Cardiac Risk Estimation: >4 METS. ASA Class 2. Alexsandra Vinicius perioperative risk assessment with 0.2% risk of myocardial infarction or cardiac arrest intraoperatively or up to 30 days postop. Patient is considered low risk for major adverse cardiac event and no further testing recommended per 2014 ACC/AHA Guidelines. Current medications which may produce withdrawal symptoms if withheld perioperatively: gabapentin, oxcarbazepine    1. Preoperative workup as follows: EKG; hemoglobin, hematocrit, electrolytes, creatinine, glucose pending per surgeon. 2. Change in medication regimen before surgery: hold meloxicam for 7 days starting 23; continue all other medications including morning of surgery, w/ sip of water. 3. Prophylaxis for cardiac events with perioperative beta-blockers: not indicated. 4. Invasive hemodynamic monitoring perioperatively: not indicated. 5. Deep vein thrombosis prophylaxis postoperatively: regimen to be chosen by surgical team.    Patient taking incorrect antihypertensives. Somehow restarted lisinopril 40 mg daily. Not currently taking hydralazine. Blood pressure at goal but previous electrolyte abnormalities on lisinopril. Will recheck with pre-surgery labs then consider readjusting if needed. If labs within normal limits, patient is medically optimized for planned surgery. Return in about 2 months (around 2023). 33 minutes was spent precharting, face-to-face with patient, and documenting on day of encounter.     Subjective:     Chief Complaint   Patient presents with    Pre-op Exam       Peggy Webster is a 74 y.o. y.o. female who presents to the office today

## 2023-08-02 ENCOUNTER — HOSPITAL ENCOUNTER (OUTPATIENT)
Dept: PREADMISSION TESTING | Age: 68
Discharge: HOME | End: 2023-08-02
Payer: COMMERCIAL

## 2023-08-02 VITALS
SYSTOLIC BLOOD PRESSURE: 155 MMHG | WEIGHT: 140.31 LBS | RESPIRATION RATE: 18 BRPM | DIASTOLIC BLOOD PRESSURE: 77 MMHG | HEIGHT: 63 IN | HEART RATE: 90 BPM | TEMPERATURE: 96.9 F | BODY MASS INDEX: 24.86 KG/M2 | OXYGEN SATURATION: 99 %

## 2023-08-02 DIAGNOSIS — Z01.818 PRE-OP TESTING: ICD-10-CM

## 2023-08-02 LAB
ANION GAP SERPL CALCULATED.3IONS-SCNC: 11 MMOL/L (ref 7–16)
BASOPHILS # BLD: 0.02 K/UL (ref 0–0.2)
BASOPHILS NFR BLD: 0 % (ref 0–2)
BUN SERPL-MCNC: 14 MG/DL (ref 6–23)
CALCIUM SERPL-MCNC: 9.2 MG/DL (ref 8.6–10.2)
CHLORIDE SERPL-SCNC: 93 MMOL/L (ref 98–107)
CO2 SERPL-SCNC: 25 MMOL/L (ref 22–29)
CREAT SERPL-MCNC: 0.5 MG/DL (ref 0.5–1)
EOSINOPHIL # BLD: 0.05 K/UL (ref 0.05–0.5)
EOSINOPHILS RELATIVE PERCENT: 1 % (ref 0–6)
ERYTHROCYTE [DISTWIDTH] IN BLOOD BY AUTOMATED COUNT: 12.5 % (ref 11.5–15)
GFR SERPL CREATININE-BSD FRML MDRD: >60 ML/MIN/1.73M2
GLUCOSE SERPL-MCNC: 102 MG/DL (ref 74–99)
HCT VFR BLD AUTO: 31.5 % (ref 34–48)
HGB BLD-MCNC: 10.6 G/DL (ref 11.5–15.5)
IMM GRANULOCYTES # BLD AUTO: <0.03 K/UL (ref 0–0.58)
IMM GRANULOCYTES NFR BLD: 0 % (ref 0–5)
LYMPHOCYTES NFR BLD: 1.57 K/UL (ref 1.5–4)
LYMPHOCYTES RELATIVE PERCENT: 35 % (ref 20–42)
MCH RBC QN AUTO: 30.8 PG (ref 26–35)
MCHC RBC AUTO-ENTMCNC: 33.7 G/DL (ref 32–34.5)
MCV RBC AUTO: 91.6 FL (ref 80–99.9)
MONOCYTES NFR BLD: 0.45 K/UL (ref 0.1–0.95)
MONOCYTES NFR BLD: 10 % (ref 2–12)
NEUTROPHILS NFR BLD: 54 % (ref 43–80)
NEUTS SEG NFR BLD: 2.46 K/UL (ref 1.8–7.3)
PLATELET # BLD AUTO: 322 K/UL (ref 130–450)
PMV BLD AUTO: 8.7 FL (ref 7–12)
POTASSIUM SERPL-SCNC: 4.3 MMOL/L (ref 3.5–5)
PREALB SERPL-MCNC: 19 MG/DL (ref 20–40)
RBC # BLD AUTO: 3.44 M/UL (ref 3.5–5.5)
SODIUM SERPL-SCNC: 129 MMOL/L (ref 132–146)
WBC OTHER # BLD: 4.6 K/UL (ref 4.5–11.5)

## 2023-08-02 PROCEDURE — 87081 CULTURE SCREEN ONLY: CPT

## 2023-08-02 PROCEDURE — 85025 COMPLETE CBC W/AUTO DIFF WBC: CPT

## 2023-08-02 PROCEDURE — 36415 COLL VENOUS BLD VENIPUNCTURE: CPT

## 2023-08-02 PROCEDURE — 80048 BASIC METABOLIC PNL TOTAL CA: CPT

## 2023-08-02 PROCEDURE — 84134 ASSAY OF PREALBUMIN: CPT

## 2023-08-02 ASSESSMENT — HOOS JR
SITTING: 4
WALKING ON UNEVEN SURFACE: 3
GOING UP OR DOWN STAIRS: 3
BENDING TO THE FLOOR TO PICK UP OBJECT: 4
RISING FROM SITTING: 3
HOOS JR RAW SCORE: 21
LYING IN BED (TURNING OVER, MAINTAINING HIP POSITION): 4
HOOS JR TOTAL INTERVAL SCORE: 20.805
HOOS JR RAW SCORE: 21

## 2023-08-02 NOTE — PROGRESS NOTES
[facility] PRE-ADMISSION TESTING INSTRUCTIONS  Surgery Date 8-14-23   Arrival Time 6:30 a.m. The Preadmission Testing patient is instructed accordingly using the following criteria (check applicable):    ARRIVAL INSTRUCTIONS:  [x] Parking the day of Surgery is located in the Main Entrance lot. Upon entering the door, make an immediate right to the surgery reception desk    [x] Bring photo ID and insurance card    [] Bring in a copy of Living will or Durable Power of  papers. [] Please be sure to arrange for responsible adult to provide transportation to and from the hospital    [] Please arrange for responsible adult to be with you for the 24 hour period post procedure due to having anesthesia    [x] If you awake am of surgery not feeling well or have temperature >100 please call [phone]    GENERAL INSTRUCTIONS:    [x] Follow instructions for hydration that have been provided to you at your Pre-Admission Visit. Solid food until midnight then clear liquids. No gum, candy or mints. [x] You may brush your teeth, but do not swallow any water    [x] Take medications as instructed with 1-2 oz of water    [x] Stop herbal supplements and vitamins 5 days prior to procedure    [x] Follow preop dosing of blood thinners per physician instructions    [] Take 1/2 dose of evening insulin, but no insulin after midnight    [] No oral diabetic medications after midnight    [] If diabetic and have low blood sugar or feel symptomatic, take 1-2oz apple juice only    [] Bring inhalers day of surgery    [] Bring C-PAP/ Bi-Pap day of surgery    [] Bring urine specimen day of surgery    [] Shower or bath with soap, lather and rinse well, AM of Surgery, no lotion, powders or creams to surgical site    [] Follow bowel prep as instructed per surgeon    [x] No tobacco products within 24 hours of surgery     [x] No alcohol or illegal drug use within 24 hours of surgery.     [x] Jewelry, body

## 2023-08-03 LAB
MICROORGANISM SPEC CULT: NORMAL
SPECIMEN DESCRIPTION: NORMAL

## 2023-08-09 ENCOUNTER — OFFICE VISIT (OUTPATIENT)
Dept: ORTHOPEDIC SURGERY | Age: 68
End: 2023-08-09
Payer: COMMERCIAL

## 2023-08-09 DIAGNOSIS — M25.551 RIGHT HIP PAIN: Primary | ICD-10-CM

## 2023-08-09 PROCEDURE — G8427 DOCREV CUR MEDS BY ELIG CLIN: HCPCS | Performed by: ORTHOPAEDIC SURGERY

## 2023-08-09 PROCEDURE — 1123F ACP DISCUSS/DSCN MKR DOCD: CPT | Performed by: ORTHOPAEDIC SURGERY

## 2023-08-09 PROCEDURE — G8400 PT W/DXA NO RESULTS DOC: HCPCS | Performed by: ORTHOPAEDIC SURGERY

## 2023-08-09 PROCEDURE — 1090F PRES/ABSN URINE INCON ASSESS: CPT | Performed by: ORTHOPAEDIC SURGERY

## 2023-08-09 PROCEDURE — 1036F TOBACCO NON-USER: CPT | Performed by: ORTHOPAEDIC SURGERY

## 2023-08-09 PROCEDURE — 3017F COLORECTAL CA SCREEN DOC REV: CPT | Performed by: ORTHOPAEDIC SURGERY

## 2023-08-09 PROCEDURE — 99214 OFFICE O/P EST MOD 30 MIN: CPT | Performed by: ORTHOPAEDIC SURGERY

## 2023-08-09 PROCEDURE — G8420 CALC BMI NORM PARAMETERS: HCPCS | Performed by: ORTHOPAEDIC SURGERY

## 2023-08-09 NOTE — PROGRESS NOTES
anesthesia:  No    Social History:   TOBACCO:   reports that she has never smoked. She has never used smokeless tobacco.  ETOH:   reports no history of alcohol use. DRUGS:   reports no history of drug use. Family History:       Problem Relation Age of Onset    Heart Disease Mother     Heart Disease Father     No Known Problems Sister     No Known Problems Brother     Blindness Son     Premature Birth Son     No Known Problems Sister     No Known Problems Sister     No Known Problems Brother        REVIEW OF SYSTEMS:  CONSTITUTIONAL:  negative  RESPIRATORY:  negative  CARDIOVASCULAR:  negative  MUSCULOSKELETAL:  negative except for  HPI  NEUROLOGICAL:  negative    PHYSICAL EXAM:     VITALS:  There were no vitals taken for this visit. Gen: AOx3, NAD    Heart:  normal apical pulses, normal S1 and S2 and no edema    Lungs:  No increased work of breathing, good air exchange     Abdomen:  no scars, non-distended and non-tender    Extremities:  No clubbing, cyanosis, or edema    Musculoskeletal: Exam of the right hip displays subtle shortening of the right leg compared to the left. There is significant pain with all forms of motion. Neurovascular sensation grossly intact. Negative swelling or deformity.       DATA:  CBC:   Lab Results   Component Value Date/Time    WBC 4.6 08/02/2023 08:05 AM    RBC 3.44 08/02/2023 08:05 AM    HGB 10.6 08/02/2023 08:05 AM    HCT 31.5 08/02/2023 08:05 AM    MCV 91.6 08/02/2023 08:05 AM    MCH 30.8 08/02/2023 08:05 AM    MCHC 33.7 08/02/2023 08:05 AM    RDW 12.5 08/02/2023 08:05 AM     08/02/2023 08:05 AM    MPV 8.7 08/02/2023 08:05 AM     BMP:    Lab Results   Component Value Date/Time     08/02/2023 08:05 AM    K 4.3 08/02/2023 08:05 AM    CL 93 08/02/2023 08:05 AM    CO2 25 08/02/2023 08:05 AM    BUN 14 08/02/2023 08:05 AM    LABALBU 3.9 05/11/2023 04:00 AM    LABALBU 3.4 04/18/2012 05:30 AM    CREATININE 0.5 08/02/2023 08:05 AM    CALCIUM 9.2 08/02/2023 08:05 AM

## 2023-08-13 ENCOUNTER — ANESTHESIA EVENT (OUTPATIENT)
Dept: OPERATING ROOM | Age: 68
DRG: 469 | End: 2023-08-13
Payer: MEDICARE

## 2023-08-14 ENCOUNTER — HOSPITAL ENCOUNTER (INPATIENT)
Age: 68
LOS: 1 days | Discharge: ANOTHER HOSPITAL | DRG: 469 | End: 2023-08-16
Attending: ORTHOPAEDIC SURGERY | Admitting: ORTHOPAEDIC SURGERY
Payer: MEDICARE

## 2023-08-14 ENCOUNTER — APPOINTMENT (OUTPATIENT)
Dept: GENERAL RADIOLOGY | Age: 68
DRG: 469 | End: 2023-08-14
Attending: ORTHOPAEDIC SURGERY
Payer: MEDICARE

## 2023-08-14 ENCOUNTER — ANESTHESIA (OUTPATIENT)
Dept: OPERATING ROOM | Age: 68
DRG: 469 | End: 2023-08-14
Payer: MEDICARE

## 2023-08-14 DIAGNOSIS — Z01.818 PRE-OP TESTING: ICD-10-CM

## 2023-08-14 DIAGNOSIS — Z96.641 STATUS POST TOTAL HIP REPLACEMENT, RIGHT: Primary | ICD-10-CM

## 2023-08-14 DIAGNOSIS — M16.11 OSTEOARTHRITIS OF RIGHT HIP: ICD-10-CM

## 2023-08-14 PROCEDURE — 6360000002 HC RX W HCPCS: Performed by: NURSE PRACTITIONER

## 2023-08-14 PROCEDURE — 3600000005 HC SURGERY LEVEL 5 BASE: Performed by: ORTHOPAEDIC SURGERY

## 2023-08-14 PROCEDURE — 6360000002 HC RX W HCPCS: Performed by: ORTHOPAEDIC SURGERY

## 2023-08-14 PROCEDURE — 7100000000 HC PACU RECOVERY - FIRST 15 MIN: Performed by: ORTHOPAEDIC SURGERY

## 2023-08-14 PROCEDURE — 6360000002 HC RX W HCPCS: Performed by: ANESTHESIOLOGY

## 2023-08-14 PROCEDURE — 2500000003 HC RX 250 WO HCPCS: Performed by: ORTHOPAEDIC SURGERY

## 2023-08-14 PROCEDURE — 2500000003 HC RX 250 WO HCPCS

## 2023-08-14 PROCEDURE — 7100000001 HC PACU RECOVERY - ADDTL 15 MIN: Performed by: ORTHOPAEDIC SURGERY

## 2023-08-14 PROCEDURE — 3700000001 HC ADD 15 MINUTES (ANESTHESIA): Performed by: ORTHOPAEDIC SURGERY

## 2023-08-14 PROCEDURE — 2580000003 HC RX 258

## 2023-08-14 PROCEDURE — 3700000000 HC ANESTHESIA ATTENDED CARE: Performed by: ORTHOPAEDIC SURGERY

## 2023-08-14 PROCEDURE — 0SR902Z REPLACEMENT OF RIGHT HIP JOINT WITH METAL ON POLYETHYLENE SYNTHETIC SUBSTITUTE, OPEN APPROACH: ICD-10-PCS | Performed by: ORTHOPAEDIC SURGERY

## 2023-08-14 PROCEDURE — C1776 JOINT DEVICE (IMPLANTABLE): HCPCS | Performed by: ORTHOPAEDIC SURGERY

## 2023-08-14 PROCEDURE — 73502 X-RAY EXAM HIP UNI 2-3 VIEWS: CPT

## 2023-08-14 PROCEDURE — G0378 HOSPITAL OBSERVATION PER HR: HCPCS

## 2023-08-14 PROCEDURE — 6370000000 HC RX 637 (ALT 250 FOR IP): Performed by: NURSE PRACTITIONER

## 2023-08-14 PROCEDURE — 2709999900 HC NON-CHARGEABLE SUPPLY: Performed by: ORTHOPAEDIC SURGERY

## 2023-08-14 PROCEDURE — 3600000015 HC SURGERY LEVEL 5 ADDTL 15MIN: Performed by: ORTHOPAEDIC SURGERY

## 2023-08-14 PROCEDURE — 97165 OT EVAL LOW COMPLEX 30 MIN: CPT

## 2023-08-14 PROCEDURE — 2580000003 HC RX 258: Performed by: ORTHOPAEDIC SURGERY

## 2023-08-14 PROCEDURE — 97530 THERAPEUTIC ACTIVITIES: CPT

## 2023-08-14 PROCEDURE — 2580000003 HC RX 258: Performed by: NURSE PRACTITIONER

## 2023-08-14 PROCEDURE — 6360000002 HC RX W HCPCS

## 2023-08-14 DEVICE — TRIDENT X3 0 DEGREE POLYETHYLENE INSERT
Type: IMPLANTABLE DEVICE | Site: HIP | Status: FUNCTIONAL
Brand: TRIDENT X3 INSERT

## 2023-08-14 DEVICE — SCREW BNE L20MM DIA65MM LO PROF HEX TRIDENT LL: Type: IMPLANTABLE DEVICE | Site: HIP | Status: FUNCTIONAL

## 2023-08-14 DEVICE — CERAMIC V40 FEMORAL HEAD
Type: IMPLANTABLE DEVICE | Site: HIP | Status: FUNCTIONAL
Brand: BIOLOX

## 2023-08-14 DEVICE — 127 DEGREE NECK ANGLE HIP STEM
Type: IMPLANTABLE DEVICE | Site: HIP | Status: FUNCTIONAL
Brand: ACCOLADE

## 2023-08-14 DEVICE — TRIDENT II TRITANIUM CLUSTER 52E
Type: IMPLANTABLE DEVICE | Site: HIP | Status: FUNCTIONAL
Brand: TRIDENT II

## 2023-08-14 RX ORDER — SODIUM CHLORIDE 0.9 % (FLUSH) 0.9 %
5-40 SYRINGE (ML) INJECTION PRN
Status: DISCONTINUED | OUTPATIENT
Start: 2023-08-14 | End: 2023-08-16 | Stop reason: HOSPADM

## 2023-08-14 RX ORDER — DEXAMETHASONE SODIUM PHOSPHATE 10 MG/ML
8 INJECTION, SOLUTION INTRAMUSCULAR; INTRAVENOUS ONCE
Status: DISCONTINUED | OUTPATIENT
Start: 2023-08-14 | End: 2023-08-14 | Stop reason: HOSPADM

## 2023-08-14 RX ORDER — ONDANSETRON 4 MG/1
4 TABLET, ORALLY DISINTEGRATING ORAL EVERY 8 HOURS PRN
Status: DISCONTINUED | OUTPATIENT
Start: 2023-08-14 | End: 2023-08-16 | Stop reason: HOSPADM

## 2023-08-14 RX ORDER — MIDAZOLAM HYDROCHLORIDE 1 MG/ML
INJECTION INTRAMUSCULAR; INTRAVENOUS PRN
Status: DISCONTINUED | OUTPATIENT
Start: 2023-08-14 | End: 2023-08-14 | Stop reason: SDUPTHER

## 2023-08-14 RX ORDER — SODIUM CHLORIDE 9 MG/ML
INJECTION, SOLUTION INTRAVENOUS CONTINUOUS PRN
Status: DISCONTINUED | OUTPATIENT
Start: 2023-08-14 | End: 2023-08-14 | Stop reason: SDUPTHER

## 2023-08-14 RX ORDER — OXCARBAZEPINE 300 MG/1
600 TABLET, FILM COATED ORAL 3 TIMES DAILY
Status: DISCONTINUED | OUTPATIENT
Start: 2023-08-14 | End: 2023-08-16 | Stop reason: HOSPADM

## 2023-08-14 RX ORDER — PANTOPRAZOLE SODIUM 40 MG/1
40 TABLET, DELAYED RELEASE ORAL EVERY MORNING
Status: DISCONTINUED | OUTPATIENT
Start: 2023-08-15 | End: 2023-08-16

## 2023-08-14 RX ORDER — KETOROLAC TROMETHAMINE 30 MG/ML
15 INJECTION, SOLUTION INTRAMUSCULAR; INTRAVENOUS ONCE
Status: COMPLETED | OUTPATIENT
Start: 2023-08-14 | End: 2023-08-14

## 2023-08-14 RX ORDER — MEPERIDINE HYDROCHLORIDE 25 MG/ML
12.5 INJECTION INTRAMUSCULAR; INTRAVENOUS; SUBCUTANEOUS EVERY 5 MIN PRN
Status: DISCONTINUED | OUTPATIENT
Start: 2023-08-14 | End: 2023-08-14 | Stop reason: HOSPADM

## 2023-08-14 RX ORDER — SODIUM CHLORIDE 0.9 % (FLUSH) 0.9 %
5-40 SYRINGE (ML) INJECTION PRN
Status: DISCONTINUED | OUTPATIENT
Start: 2023-08-14 | End: 2023-08-14 | Stop reason: HOSPADM

## 2023-08-14 RX ORDER — DICYCLOMINE HYDROCHLORIDE 10 MG/1
20 CAPSULE ORAL 4 TIMES DAILY PRN
Status: DISCONTINUED | OUTPATIENT
Start: 2023-08-14 | End: 2023-08-16 | Stop reason: HOSPADM

## 2023-08-14 RX ORDER — OXYCODONE HYDROCHLORIDE 5 MG/1
5 TABLET ORAL EVERY 6 HOURS PRN
Qty: 28 TABLET | Refills: 0 | Status: ON HOLD | OUTPATIENT
Start: 2023-08-14 | End: 2023-08-21

## 2023-08-14 RX ORDER — SODIUM CHLORIDE 0.9 % (FLUSH) 0.9 %
5-40 SYRINGE (ML) INJECTION EVERY 12 HOURS SCHEDULED
Status: DISCONTINUED | OUTPATIENT
Start: 2023-08-14 | End: 2023-08-16 | Stop reason: HOSPADM

## 2023-08-14 RX ORDER — ONDANSETRON 2 MG/ML
4 INJECTION INTRAMUSCULAR; INTRAVENOUS EVERY 6 HOURS PRN
Status: DISCONTINUED | OUTPATIENT
Start: 2023-08-14 | End: 2023-08-16 | Stop reason: HOSPADM

## 2023-08-14 RX ORDER — MECLIZINE HCL 12.5 MG/1
12.5 TABLET ORAL 3 TIMES DAILY PRN
Status: DISCONTINUED | OUTPATIENT
Start: 2023-08-14 | End: 2023-08-16 | Stop reason: HOSPADM

## 2023-08-14 RX ORDER — AMLODIPINE BESYLATE 10 MG/1
10 TABLET ORAL DAILY
Status: DISCONTINUED | OUTPATIENT
Start: 2023-08-15 | End: 2023-08-16 | Stop reason: HOSPADM

## 2023-08-14 RX ORDER — SODIUM CHLORIDE 9 MG/ML
INJECTION, SOLUTION INTRAVENOUS PRN
Status: DISCONTINUED | OUTPATIENT
Start: 2023-08-14 | End: 2023-08-14 | Stop reason: HOSPADM

## 2023-08-14 RX ORDER — BACLOFEN 10 MG/1
10 TABLET ORAL 3 TIMES DAILY PRN
Status: DISCONTINUED | OUTPATIENT
Start: 2023-08-14 | End: 2023-08-16

## 2023-08-14 RX ORDER — SODIUM CHLORIDE 0.9 % (FLUSH) 0.9 %
5-40 SYRINGE (ML) INJECTION EVERY 12 HOURS SCHEDULED
Status: DISCONTINUED | OUTPATIENT
Start: 2023-08-14 | End: 2023-08-14 | Stop reason: HOSPADM

## 2023-08-14 RX ORDER — GABAPENTIN 400 MG/1
800 CAPSULE ORAL 3 TIMES DAILY
Status: DISCONTINUED | OUTPATIENT
Start: 2023-08-14 | End: 2023-08-16 | Stop reason: HOSPADM

## 2023-08-14 RX ORDER — PROPOFOL 10 MG/ML
INJECTION, EMULSION INTRAVENOUS CONTINUOUS PRN
Status: DISCONTINUED | OUTPATIENT
Start: 2023-08-14 | End: 2023-08-14 | Stop reason: SDUPTHER

## 2023-08-14 RX ORDER — LIDOCAINE HYDROCHLORIDE 20 MG/ML
INJECTION, SOLUTION EPIDURAL; INFILTRATION; INTRACAUDAL; PERINEURAL PRN
Status: DISCONTINUED | OUTPATIENT
Start: 2023-08-14 | End: 2023-08-14 | Stop reason: SDUPTHER

## 2023-08-14 RX ORDER — DIPHENHYDRAMINE HYDROCHLORIDE 50 MG/ML
12.5 INJECTION INTRAMUSCULAR; INTRAVENOUS
Status: DISCONTINUED | OUTPATIENT
Start: 2023-08-14 | End: 2023-08-14 | Stop reason: HOSPADM

## 2023-08-14 RX ORDER — ASPIRIN 81 MG/1
81 TABLET, CHEWABLE ORAL 2 TIMES DAILY
Qty: 56 TABLET | Refills: 0 | Status: ON HOLD | OUTPATIENT
Start: 2023-08-14 | End: 2023-09-11

## 2023-08-14 RX ORDER — ACETAMINOPHEN 500 MG
1000 TABLET ORAL ONCE
Status: DISCONTINUED | OUTPATIENT
Start: 2023-08-14 | End: 2023-08-14

## 2023-08-14 RX ORDER — BUPIVACAINE HYDROCHLORIDE 7.5 MG/ML
INJECTION, SOLUTION INTRASPINAL
Status: COMPLETED | OUTPATIENT
Start: 2023-08-14 | End: 2023-08-14

## 2023-08-14 RX ORDER — SODIUM CHLORIDE 9 MG/ML
INJECTION, SOLUTION INTRAVENOUS PRN
Status: DISCONTINUED | OUTPATIENT
Start: 2023-08-14 | End: 2023-08-16 | Stop reason: HOSPADM

## 2023-08-14 RX ORDER — KETOROLAC TROMETHAMINE 30 MG/ML
15 INJECTION, SOLUTION INTRAMUSCULAR; INTRAVENOUS EVERY 6 HOURS
Status: DISCONTINUED | OUTPATIENT
Start: 2023-08-14 | End: 2023-08-16 | Stop reason: HOSPADM

## 2023-08-14 RX ORDER — OXYCODONE HYDROCHLORIDE 5 MG/1
5 TABLET ORAL EVERY 4 HOURS PRN
Status: DISCONTINUED | OUTPATIENT
Start: 2023-08-14 | End: 2023-08-16 | Stop reason: HOSPADM

## 2023-08-14 RX ORDER — ASPIRIN 81 MG/1
81 TABLET ORAL 2 TIMES DAILY
Status: DISCONTINUED | OUTPATIENT
Start: 2023-08-14 | End: 2023-08-16

## 2023-08-14 RX ORDER — TRAZODONE HYDROCHLORIDE 50 MG/1
50 TABLET ORAL NIGHTLY PRN
Status: DISCONTINUED | OUTPATIENT
Start: 2023-08-14 | End: 2023-08-16 | Stop reason: HOSPADM

## 2023-08-14 RX ORDER — LISINOPRIL 20 MG/1
40 TABLET ORAL DAILY
Status: DISCONTINUED | OUTPATIENT
Start: 2023-08-14 | End: 2023-08-16 | Stop reason: HOSPADM

## 2023-08-14 RX ORDER — VANCOMYCIN HYDROCHLORIDE 1 G/20ML
INJECTION, POWDER, LYOPHILIZED, FOR SOLUTION INTRAVENOUS PRN
Status: DISCONTINUED | OUTPATIENT
Start: 2023-08-14 | End: 2023-08-14 | Stop reason: ALTCHOICE

## 2023-08-14 RX ORDER — OXYCODONE HYDROCHLORIDE 5 MG/1
10 TABLET ORAL EVERY 4 HOURS PRN
Status: DISCONTINUED | OUTPATIENT
Start: 2023-08-14 | End: 2023-08-16 | Stop reason: HOSPADM

## 2023-08-14 RX ADMIN — PHENYLEPHRINE HYDROCHLORIDE 50 MCG: 10 INJECTION INTRAVENOUS at 09:36

## 2023-08-14 RX ADMIN — HYDROMORPHONE HYDROCHLORIDE 0.5 MG: 0.5 INJECTION, SOLUTION INTRAMUSCULAR; INTRAVENOUS; SUBCUTANEOUS at 11:12

## 2023-08-14 RX ADMIN — LISINOPRIL 40 MG: 20 TABLET ORAL at 13:45

## 2023-08-14 RX ADMIN — SODIUM CHLORIDE: 9 INJECTION, SOLUTION INTRAVENOUS at 08:33

## 2023-08-14 RX ADMIN — ASPIRIN 81 MG: 81 TABLET, COATED ORAL at 22:15

## 2023-08-14 RX ADMIN — KETOROLAC TROMETHAMINE 15 MG: 30 INJECTION, SOLUTION INTRAMUSCULAR; INTRAVENOUS at 20:45

## 2023-08-14 RX ADMIN — SODIUM CHLORIDE, PRESERVATIVE FREE 10 ML: 5 INJECTION INTRAVENOUS at 17:11

## 2023-08-14 RX ADMIN — GABAPENTIN 800 MG: 400 CAPSULE ORAL at 22:15

## 2023-08-14 RX ADMIN — OXYCODONE HYDROCHLORIDE 10 MG: 5 TABLET ORAL at 16:35

## 2023-08-14 RX ADMIN — KETOROLAC TROMETHAMINE 15 MG: 30 INJECTION, SOLUTION INTRAMUSCULAR; INTRAVENOUS at 13:22

## 2023-08-14 RX ADMIN — ASPIRIN 81 MG: 81 TABLET, COATED ORAL at 13:45

## 2023-08-14 RX ADMIN — TRANEXAMIC ACID 1000 MG: 100 INJECTION, SOLUTION INTRAVENOUS at 08:53

## 2023-08-14 RX ADMIN — Medication 10 ML: at 22:34

## 2023-08-14 RX ADMIN — PHENYLEPHRINE HYDROCHLORIDE 50 MCG: 10 INJECTION INTRAVENOUS at 09:44

## 2023-08-14 RX ADMIN — LIDOCAINE HYDROCHLORIDE 40 MG: 20 INJECTION, SOLUTION EPIDURAL; INFILTRATION; INTRACAUDAL; PERINEURAL at 08:48

## 2023-08-14 RX ADMIN — WATER 2000 MG: 1 INJECTION INTRAMUSCULAR; INTRAVENOUS; SUBCUTANEOUS at 09:06

## 2023-08-14 RX ADMIN — OXCARBAZEPINE 600 MG: 300 TABLET, FILM COATED ORAL at 22:15

## 2023-08-14 RX ADMIN — TRANEXAMIC ACID 1000 MG: 100 INJECTION, SOLUTION INTRAVENOUS at 11:32

## 2023-08-14 RX ADMIN — GABAPENTIN 800 MG: 400 CAPSULE ORAL at 13:45

## 2023-08-14 RX ADMIN — ONDANSETRON 4 MG: 2 INJECTION INTRAMUSCULAR; INTRAVENOUS at 13:23

## 2023-08-14 RX ADMIN — WATER 2000 MG: 1 INJECTION INTRAMUSCULAR; INTRAVENOUS; SUBCUTANEOUS at 16:35

## 2023-08-14 RX ADMIN — PHENYLEPHRINE HYDROCHLORIDE 100 MCG: 10 INJECTION INTRAVENOUS at 09:55

## 2023-08-14 RX ADMIN — KETOROLAC TROMETHAMINE 15 MG: 30 INJECTION, SOLUTION INTRAMUSCULAR; INTRAVENOUS at 08:06

## 2023-08-14 RX ADMIN — OXYCODONE HYDROCHLORIDE 10 MG: 5 TABLET ORAL at 20:45

## 2023-08-14 RX ADMIN — PROPOFOL 50 MCG/KG/MIN: 10 INJECTION, EMULSION INTRAVENOUS at 08:49

## 2023-08-14 RX ADMIN — PHENYLEPHRINE HYDROCHLORIDE 100 MCG: 10 INJECTION INTRAVENOUS at 10:23

## 2023-08-14 RX ADMIN — MIDAZOLAM 2 MG: 1 INJECTION INTRAMUSCULAR; INTRAVENOUS at 08:41

## 2023-08-14 RX ADMIN — PHENYLEPHRINE HYDROCHLORIDE 100 MCG: 10 INJECTION INTRAVENOUS at 10:13

## 2023-08-14 RX ADMIN — BUPIVACAINE HYDROCHLORIDE IN DEXTROSE 1.6 MG: 7.5 INJECTION, SOLUTION SUBARACHNOID at 08:45

## 2023-08-14 ASSESSMENT — PAIN DESCRIPTION - LOCATION
LOCATION: HIP
LOCATION: FACE;HIP
LOCATION: HIP

## 2023-08-14 ASSESSMENT — PAIN DESCRIPTION - ORIENTATION
ORIENTATION: RIGHT

## 2023-08-14 ASSESSMENT — PAIN DESCRIPTION - DESCRIPTORS
DESCRIPTORS: ACHING
DESCRIPTORS: ACHING;THROBBING
DESCRIPTORS: ACHING
DESCRIPTORS: ACHING;DISCOMFORT
DESCRIPTORS: ACHING
DESCRIPTORS: ACHING;THROBBING
DESCRIPTORS: ACHING
DESCRIPTORS: DULL;STABBING

## 2023-08-14 ASSESSMENT — PAIN DESCRIPTION - FREQUENCY
FREQUENCY: CONTINUOUS

## 2023-08-14 ASSESSMENT — PAIN SCALES - GENERAL
PAINLEVEL_OUTOF10: 8
PAINLEVEL_OUTOF10: 7
PAINLEVEL_OUTOF10: 8
PAINLEVEL_OUTOF10: 7
PAINLEVEL_OUTOF10: 5
PAINLEVEL_OUTOF10: 5
PAINLEVEL_OUTOF10: 8

## 2023-08-14 ASSESSMENT — PAIN - FUNCTIONAL ASSESSMENT
PAIN_FUNCTIONAL_ASSESSMENT: 0-10
PAIN_FUNCTIONAL_ASSESSMENT: ACTIVITIES ARE NOT PREVENTED

## 2023-08-14 ASSESSMENT — PAIN DESCRIPTION - PAIN TYPE
TYPE: SURGICAL PAIN

## 2023-08-14 ASSESSMENT — PAIN DESCRIPTION - ONSET
ONSET: ON-GOING
ONSET: ON-GOING

## 2023-08-14 ASSESSMENT — LIFESTYLE VARIABLES
HOW MANY STANDARD DRINKS CONTAINING ALCOHOL DO YOU HAVE ON A TYPICAL DAY: PATIENT DOES NOT DRINK
HOW OFTEN DO YOU HAVE A DRINK CONTAINING ALCOHOL: NEVER

## 2023-08-14 NOTE — H&P
Department of Orthopedic Surgery  Attending Pre-operative History and Physical        DIAGNOSIS:  Right hip osteoarthritis     INDICATION:  Failed conservative management     PROCEDURE:  Right Total Hip Arthroplasty     CHIEF COMPLAINT:  Right hip pain    History Obtained From:  patient    HISTORY OF PRESENT ILLNESS:      The patient is a 74 y.o. female with significant past medical history of right hip osteoarthritis who presents with right hip pain. Patient has failed conservative treatment and continues to have debilitating pain affecting her activities of daily living. She has advanced osteoarthritis of the right hip displayed on imaging. For these reason she wishes to proceed with surgical management. Surgical involve a right total hip arthroplasty. The risks, benefits, and alternatives to the procedure were discussed at length with the patient and family members. Risks include but are not limited to infection, neurovascular injury, failure of hardware, intra- or post operative fracture, need for revision surgery, leg length discrepancy, dislocation, blood clots, and the risks of general anesthesia. Patient verbalizes understanding of the risks and wishes to proceed.        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     follows with PCP    Arthritis     Depression     Self abuse    Difficult intravenous access     History of suicide attempt 01/14/2022 2004    Hypertension     Ingestion of substance 01/14/2022 2004    Trigeminal neuralgia 2005    diagnosed       Past Surgical History:        Procedure Laterality Date    BRAIN SURGERY      decompression trigeminal nerve    CARPAL TUNNEL RELEASE Bilateral     FACIAL NERVE DECOMPRESSION      WILMAN AND BSO (CERVIX REMOVED)         Medications Prior to Admission:   Medications Prior to Admission: lisinopril (PRINIVIL;ZESTRIL) 40 MG tablet, Take 1 tablet by mouth daily  meloxicam

## 2023-08-14 NOTE — OP NOTE
assembled onto the  handle with guide depicting anteversion/inclination. The cup was impacted into place at 40 degrees of inclination and 20 degrees of anteversion. A single drill hole was placed in the posterior superior safe zone and a size 25 screw was placed . The polyethylene liner was placed. We then turned our attention to the femur. The femoral neck retractor was placed below the neck. A box cutting  Chisel was used to lateralize the entry point and then and an entry awl was placed down the femoral canal.  Next, the femur was sequentially broached to a size 5 which provided excellent fit. A 127 degree trial neck was placed, as was a trial head size 36 standard and the hip was reduced. Leg lengths were checked and found to be equal.  There was excellent stability even with extremes of flexion, adduction, and internal rotation. The hip was re-dislocated, trials removed, and the acetabulum and femoral shaft were copiously irrigated. The final femoral stem size 5 was then impacted in placed, and a size 36 standard  head was placed. The hip was again reduced and taken through a range of motion, and was very stable. The hip was irrigated once again with normal saline. The Piriformis and capsule stitches were placed through bone tunnels along the intertrochanteric ridge. Next, the fascia was closed in running fashion with O stratafix. The subcutaneous tissue was closed with interrupted 2-O vicryl, skin was closed with burried 3-O vicryl followed by running subcuticular 4-O monocryl. Dermabond was applied. .  A sterile dressing was applied. The patient was then placed supine, awoken from general anesthesia, and transferred to the hospital bed. He was taken to the PACU in stable condition. IMPLANTS:   Implant Name Type Inv.  Item Serial No.  Lot No. LRB No. Used Action   SHELL ACET SZ E [id number] 5 CLUS H TRITANIUM PRESSFIT MELVI - [id number]  SHELL ACET SZ E [id number] 5 CLUS

## 2023-08-14 NOTE — ANESTHESIA PROCEDURE NOTES
Spinal Block    Patient location during procedure: OR  End time: 8/14/2023 8:35 AM  Reason for block: primary anesthetic  Staffing  Performed: other anesthesia staff   Anesthesiologist: Max Lacey MD  Other anesthesia staff: Pilar Mcclendon RN  Spinal Block  Patient position: sitting  Prep: ChloraPrep  Patient monitoring: continuous pulse ox and frequent blood pressure checks  Approach: midline  Location: L3/L4  Provider prep: mask  Local infiltration: lidocaine  Needle  Needle type: Pencan   Needle gauge: 25 G  Needle length: 3.5 in  Assessment  Swirl obtained: Yes  CSF: clear  Attempts: 1  Hemodynamics: stable  Preanesthetic Checklist  Completed: patient identified, IV checked, site marked, risks and benefits discussed, surgical/procedural consents, equipment checked, pre-op evaluation, timeout performed, anesthesia consent given, oxygen available and monitors applied/VS acknowledged

## 2023-08-14 NOTE — ANESTHESIA POSTPROCEDURE EVALUATION
Department of Anesthesiology  Postprocedure Note    Patient: Teresa Sanchez  MRN: [id number]  YOB: 1955  Date of evaluation: 8/14/2023      Procedure Summary     Date: 08/14/23 Room / Location: [facility] OR 05 / [facility]    Anesthesia Start: 0560 Anesthesia Stop: 1106    Procedure: RIGHT TOTAL HIP ARTHROPLASTY (Right: Hip) Diagnosis:       Osteoarthritis of right hip      (Osteoarthritis of right hip [M16.11])    Surgeons: Kat Oneil MD Responsible Provider: Pam Howard MD    Anesthesia Type: spinal ASA Status: 3          Anesthesia Type: No value filed.     Eran Phase I: Eran Score: 10    Eran Phase II:        Anesthesia Post Evaluation    Patient location during evaluation: PACU  Patient participation: complete - patient participated  Level of consciousness: awake and alert  Pain score: 0  Airway patency: patent  Nausea & Vomiting: no nausea and no vomiting  Complications: no  Cardiovascular status: hemodynamically stable  Respiratory status: acceptable  Hydration status: stable  Multimodal analgesia pain management approach  Pain management: adequate and satisfactory to patient

## 2023-08-14 NOTE — ANESTHESIA PRE PROCEDURE
Department of Anesthesiology  Preprocedure Note       Name:  Gerardine Curling   Age:  74 y.o.  :  1955                                          MRN:  [id number]         Date:  2023      Surgeon: Samantha Arenas):  Joey Treviño MD    Procedure: Procedure(s):  RIGHT TOTAL HIP ARTHROPLASTY   +++AMBER+++    Medications prior to admission:   Prior to Admission medications    Medication Sig Start Date End Date Taking? Authorizing Provider   aspirin (ASPIRIN CHILDRENS) 81 MG chewable tablet Take 1 tablet by mouth 2 times daily for 28 days 23 Yes NABOR Maldonado CNP   oxyCODONE (ROXICODONE) 5 MG immediate release tablet Take 1 tablet by mouth every 6 hours as needed for Pain for up to 7 days. Intended supply: 7 days.  Take lowest dose possible to manage pain Max Daily Amount: 20 mg 23 Yes NABOR Maldonado CNP   lisinopril (PRINIVIL;ZESTRIL) 40 MG tablet Take 1 tablet by mouth daily    Historical Provider, MD   meloxicam (MOBIC) 15 MG tablet Take 1 tablet by mouth daily as needed for Pain with food 23   Omer Vu MD   menthol-methyl salicylate (ICY HOT) 71-65 % external cream Apply topically 4 times daily as needed for Pain 23   Omer Vu MD   pantoprazole (PROTONIX) 40 MG tablet Take 1 tablet by mouth every morning 23   Omer Vu MD   amLODIPine (NORVASC) 10 MG tablet Take 1 tablet by mouth daily 23   Omer Vu MD   dicyclomine (BENTYL) 20 MG tablet Take 1 tablet by mouth 4 times daily as needed (ABD DISCOMFORT)    Historical Provider, MD   polyethylene glycol (GLYCOLAX) 17 GM/SCOOP powder Take 17 g by mouth daily as needed (CONSTIPATION)    Historical Provider, MD   OXcarbazepine (TRILEPTAL) 600 MG tablet Take 1 tablet by mouth 3 times daily 23   Omer Vu MD   meclizine (ANTIVERT) 12.5 MG tablet Take 1 tablet by mouth 3 times daily as needed for Dizziness 23   Omer Vu MD   baclofen (LIORESAL)

## 2023-08-14 NOTE — ACP (ADVANCE CARE PLANNING)
Advance Care Planning   Healthcare Decision Maker:    Primary Decision Maker: Og Vines [facility]  [phone]    Click here to complete Healthcare Decision Makers including selection of the Healthcare Decision Maker Relationship (ie \"Primary\").

## 2023-08-14 NOTE — CARE COORDINATION
Met with patient about diagnosis and discharge plan of care. Post op right MICKEY. Pt seen in ortho class. Lives in 2 story home with spouse. Bed and bath on 2nd floor-15 steps with rails. Ordered wheeled walker and toilet riser with arms through [facility]. Pt has bedside commode, walk in shower with seat. Plan is home with [facility] home care per pt choice-notified and orders completed. PCP is Dr Jose G Sheikh.  Will follow-o

## 2023-08-15 LAB
BASOPHILS # BLD: 0.01 K/UL (ref 0–0.2)
BASOPHILS NFR BLD: 0 % (ref 0–2)
EOSINOPHIL # BLD: 0.01 K/UL (ref 0.05–0.5)
EOSINOPHILS RELATIVE PERCENT: 0 % (ref 0–6)
ERYTHROCYTE [DISTWIDTH] IN BLOOD BY AUTOMATED COUNT: 11.9 % (ref 11.5–15)
HCT VFR BLD AUTO: 30.5 % (ref 34–48)
HGB BLD-MCNC: 10.4 G/DL (ref 11.5–15.5)
IMM GRANULOCYTES # BLD AUTO: <0.03 K/UL (ref 0–0.58)
IMM GRANULOCYTES NFR BLD: 0 % (ref 0–5)
LYMPHOCYTES NFR BLD: 0.83 K/UL (ref 1.5–4)
LYMPHOCYTES RELATIVE PERCENT: 13 % (ref 20–42)
MCH RBC QN AUTO: 31.1 PG (ref 26–35)
MCHC RBC AUTO-ENTMCNC: 34.1 G/DL (ref 32–34.5)
MCV RBC AUTO: 91.3 FL (ref 80–99.9)
MONOCYTES NFR BLD: 0.63 K/UL (ref 0.1–0.95)
MONOCYTES NFR BLD: 10 % (ref 2–12)
NEUTROPHILS NFR BLD: 77 % (ref 43–80)
NEUTS SEG NFR BLD: 5.09 K/UL (ref 1.8–7.3)
PLATELET # BLD AUTO: 291 K/UL (ref 130–450)
PMV BLD AUTO: 8.8 FL (ref 7–12)
RBC # BLD AUTO: 3.34 M/UL (ref 3.5–5.5)
WBC OTHER # BLD: 6.6 K/UL (ref 4.5–11.5)

## 2023-08-15 PROCEDURE — 96374 THER/PROPH/DIAG INJ IV PUSH: CPT

## 2023-08-15 PROCEDURE — 97535 SELF CARE MNGMENT TRAINING: CPT

## 2023-08-15 PROCEDURE — 97161 PT EVAL LOW COMPLEX 20 MIN: CPT

## 2023-08-15 PROCEDURE — 96376 TX/PRO/DX INJ SAME DRUG ADON: CPT

## 2023-08-15 PROCEDURE — 6370000000 HC RX 637 (ALT 250 FOR IP): Performed by: NURSE PRACTITIONER

## 2023-08-15 PROCEDURE — 6360000002 HC RX W HCPCS: Performed by: NURSE PRACTITIONER

## 2023-08-15 PROCEDURE — 97110 THERAPEUTIC EXERCISES: CPT

## 2023-08-15 PROCEDURE — 97530 THERAPEUTIC ACTIVITIES: CPT

## 2023-08-15 PROCEDURE — G0378 HOSPITAL OBSERVATION PER HR: HCPCS

## 2023-08-15 PROCEDURE — 2580000003 HC RX 258: Performed by: NURSE PRACTITIONER

## 2023-08-15 PROCEDURE — 85025 COMPLETE CBC W/AUTO DIFF WBC: CPT

## 2023-08-15 RX ADMIN — BACLOFEN 10 MG: 10 TABLET ORAL at 08:58

## 2023-08-15 RX ADMIN — GABAPENTIN 800 MG: 400 CAPSULE ORAL at 16:48

## 2023-08-15 RX ADMIN — OXCARBAZEPINE 600 MG: 300 TABLET, FILM COATED ORAL at 09:00

## 2023-08-15 RX ADMIN — GABAPENTIN 800 MG: 400 CAPSULE ORAL at 08:58

## 2023-08-15 RX ADMIN — GABAPENTIN 800 MG: 400 CAPSULE ORAL at 21:05

## 2023-08-15 RX ADMIN — SODIUM CHLORIDE, PRESERVATIVE FREE 10 ML: 5 INJECTION INTRAVENOUS at 00:38

## 2023-08-15 RX ADMIN — Medication 10 ML: at 21:06

## 2023-08-15 RX ADMIN — ASPIRIN 81 MG: 81 TABLET, COATED ORAL at 21:06

## 2023-08-15 RX ADMIN — ASPIRIN 81 MG: 81 TABLET, COATED ORAL at 08:58

## 2023-08-15 RX ADMIN — PANTOPRAZOLE SODIUM 40 MG: 40 TABLET, DELAYED RELEASE ORAL at 08:59

## 2023-08-15 RX ADMIN — KETOROLAC TROMETHAMINE 15 MG: 30 INJECTION, SOLUTION INTRAMUSCULAR; INTRAVENOUS at 03:39

## 2023-08-15 RX ADMIN — OXCARBAZEPINE 600 MG: 300 TABLET, FILM COATED ORAL at 16:48

## 2023-08-15 RX ADMIN — OXYCODONE HYDROCHLORIDE 10 MG: 5 TABLET ORAL at 11:46

## 2023-08-15 RX ADMIN — KETOROLAC TROMETHAMINE 15 MG: 30 INJECTION, SOLUTION INTRAMUSCULAR; INTRAVENOUS at 16:48

## 2023-08-15 RX ADMIN — AMLODIPINE BESYLATE 10 MG: 10 TABLET ORAL at 08:59

## 2023-08-15 RX ADMIN — OXYCODONE HYDROCHLORIDE 10 MG: 5 TABLET ORAL at 03:45

## 2023-08-15 RX ADMIN — OXYCODONE HYDROCHLORIDE 10 MG: 5 TABLET ORAL at 21:05

## 2023-08-15 RX ADMIN — KETOROLAC TROMETHAMINE 15 MG: 30 INJECTION, SOLUTION INTRAMUSCULAR; INTRAVENOUS at 08:58

## 2023-08-15 RX ADMIN — Medication 10 ML: at 09:00

## 2023-08-15 RX ADMIN — MECLIZINE 12.5 MG: 12.5 TABLET ORAL at 08:58

## 2023-08-15 RX ADMIN — OXCARBAZEPINE 600 MG: 300 TABLET, FILM COATED ORAL at 21:06

## 2023-08-15 RX ADMIN — LISINOPRIL 40 MG: 20 TABLET ORAL at 08:58

## 2023-08-15 RX ADMIN — WATER 2000 MG: 1 INJECTION INTRAMUSCULAR; INTRAVENOUS; SUBCUTANEOUS at 00:38

## 2023-08-15 RX ADMIN — KETOROLAC TROMETHAMINE 15 MG: 30 INJECTION, SOLUTION INTRAMUSCULAR; INTRAVENOUS at 21:06

## 2023-08-15 ASSESSMENT — PAIN DESCRIPTION - PAIN TYPE: TYPE: SURGICAL PAIN

## 2023-08-15 ASSESSMENT — PAIN DESCRIPTION - LOCATION
LOCATION: HIP
LOCATION: HIP

## 2023-08-15 ASSESSMENT — PAIN DESCRIPTION - DESCRIPTORS
DESCRIPTORS: DULL;ACHING
DESCRIPTORS: DULL;STABBING

## 2023-08-15 ASSESSMENT — PAIN SCALES - GENERAL
PAINLEVEL_OUTOF10: 8
PAINLEVEL_OUTOF10: 8
PAINLEVEL_OUTOF10: 7

## 2023-08-15 ASSESSMENT — PAIN - FUNCTIONAL ASSESSMENT: PAIN_FUNCTIONAL_ASSESSMENT: ACTIVITIES ARE NOT PREVENTED

## 2023-08-15 ASSESSMENT — PAIN DESCRIPTION - FREQUENCY: FREQUENCY: CONTINUOUS

## 2023-08-15 ASSESSMENT — PAIN DESCRIPTION - ORIENTATION
ORIENTATION: RIGHT
ORIENTATION: RIGHT

## 2023-08-15 ASSESSMENT — PAIN DESCRIPTION - ONSET: ONSET: ON-GOING

## 2023-08-15 NOTE — CARE COORDINATION
Updated plan of care. POD#1 right MICKEY. Ordered wheeled walker and toilet riser with arms through [facility]. Plan is home with [facility] home care-orders completed.  Possible discharge tomorrow once therapies see and pain controlled-akasho

## 2023-08-15 NOTE — PLAN OF CARE
Problem: Discharge Planning  Goal: Discharge to home or other facility with appropriate resources  8/15/2023 0837 by Dean Torres RN  Outcome: Progressing  8/15/2023 0141 by Grant Bartholomew RN  Outcome: Progressing  Flowsheets (Taken 8/14/2023 1314 by Sabina Haynes RN)  Discharge to home or other facility with appropriate resources:   Identify barriers to discharge with patient and caregiver   Identify discharge learning needs (meds, wound care, etc)   Refer to discharge planning if patient needs post-hospital services based on physician order or complex needs related to functional status, cognitive ability or social support system   Arrange for needed discharge resources and transportation as appropriate     Problem: Pain  Goal: Verbalizes/displays adequate comfort level or baseline comfort level  8/15/2023 0837 by Dean Torres RN  Outcome: Progressing  8/15/2023 0141 by Grant Bartholomew RN  Outcome: Progressing     Problem: Safety - Adult  Goal: Free from fall injury  8/15/2023 0837 by Dean Torres RN  Outcome: Progressing  8/15/2023 0141 by Grant Bartholomew RN  Outcome: Progressing     Problem: ABCDS Injury Assessment  Goal: Absence of physical injury  8/15/2023 0837 by Dean Torres RN  Outcome: Progressing  8/15/2023 0141 by Grant Bartholomew RN  Outcome: Progressing

## 2023-08-15 NOTE — PLAN OF CARE
Problem: Discharge Planning  Goal: Discharge to home or other facility with appropriate resources  Outcome: Progressing  Flowsheets (Taken 8/14/2023 1314 by Pepe Edmond RN)  Discharge to home or other facility with appropriate resources:   Identify barriers to discharge with patient and caregiver   Identify discharge learning needs (meds, wound care, etc)   Refer to discharge planning if patient needs post-hospital services based on physician order or complex needs related to functional status, cognitive ability or social support system   Arrange for needed discharge resources and transportation as appropriate     Problem: Pain  Goal: Verbalizes/displays adequate comfort level or baseline comfort level  Outcome: Progressing     Problem: Safety - Adult  Goal: Free from fall injury  Outcome: Progressing     Problem: ABCDS Injury Assessment  Goal: Absence of physical injury  Outcome: Progressing

## 2023-08-16 ENCOUNTER — HOSPITAL ENCOUNTER (INPATIENT)
Age: 68
LOS: 5 days | Discharge: HOME | DRG: 101 | End: 2023-08-21
Attending: STUDENT IN AN ORGANIZED HEALTH CARE EDUCATION/TRAINING PROGRAM | Admitting: FAMILY MEDICINE
Payer: MEDICARE

## 2023-08-16 ENCOUNTER — APPOINTMENT (OUTPATIENT)
Dept: NEUROLOGY | Age: 68
DRG: 469 | End: 2023-08-16
Attending: ORTHOPAEDIC SURGERY
Payer: MEDICARE

## 2023-08-16 ENCOUNTER — APPOINTMENT (OUTPATIENT)
Dept: GENERAL RADIOLOGY | Age: 68
DRG: 469 | End: 2023-08-16
Attending: ORTHOPAEDIC SURGERY
Payer: MEDICARE

## 2023-08-16 ENCOUNTER — APPOINTMENT (OUTPATIENT)
Dept: CT IMAGING | Age: 68
DRG: 469 | End: 2023-08-16
Attending: ORTHOPAEDIC SURGERY
Payer: MEDICARE

## 2023-08-16 VITALS
OXYGEN SATURATION: 99 % | DIASTOLIC BLOOD PRESSURE: 61 MMHG | BODY MASS INDEX: 24.61 KG/M2 | HEART RATE: 103 BPM | TEMPERATURE: 101.3 F | HEIGHT: 63 IN | RESPIRATION RATE: 14 BRPM | WEIGHT: 138.89 LBS | SYSTOLIC BLOOD PRESSURE: 130 MMHG

## 2023-08-16 PROBLEM — R56.9 NEW ONSET SEIZURE WITH ABNORMAL NEUROLOGICAL EXAM WITHOUT HEAD TRAUMA (HCC): Status: ACTIVE | Noted: 2023-08-16

## 2023-08-16 PROBLEM — R29.90 NEW ONSET SEIZURE WITH ABNORMAL NEUROLOGICAL EXAM WITHOUT HEAD TRAUMA (HCC): Status: ACTIVE | Noted: 2023-08-16

## 2023-08-16 PROBLEM — G40.909 SEIZURE DISORDER (HCC): Status: ACTIVE | Noted: 2023-08-16

## 2023-08-16 LAB
ALBUMIN SERPL-MCNC: 3.8 G/DL (ref 3.5–5.2)
ALP SERPL-CCNC: 115 U/L (ref 35–104)
ALT SERPL-CCNC: 12 U/L (ref 0–32)
ANION GAP SERPL CALCULATED.3IONS-SCNC: 12 MMOL/L (ref 7–16)
AST SERPL-CCNC: 26 U/L (ref 0–31)
B.E.: -0.2 MMOL/L (ref -3–3)
B.E.: -1.5 MMOL/L (ref -3–3)
BASOPHILS # BLD: 0.03 K/UL (ref 0–0.2)
BASOPHILS NFR BLD: 0 % (ref 0–2)
BILIRUB SERPL-MCNC: 0.4 MG/DL (ref 0–1.2)
BUN SERPL-MCNC: 23 MG/DL (ref 6–23)
CALCIUM SERPL-MCNC: 9.3 MG/DL (ref 8.6–10.2)
CHLORIDE SERPL-SCNC: 92 MMOL/L (ref 98–107)
CK SERPL-CCNC: 419 U/L (ref 20–180)
CO2 SERPL-SCNC: 24 MMOL/L (ref 22–29)
COHB: 0.1 % (ref 0–1.5)
COHB: 0.8 % (ref 0–1.5)
COMMENT: ABNORMAL
CREAT SERPL-MCNC: 0.6 MG/DL (ref 0.5–1)
CRITICAL: ABNORMAL
CRITICAL: ABNORMAL
DATE ANALYZED: ABNORMAL
DATE ANALYZED: ABNORMAL
DATE OF COLLECTION: ABNORMAL
DATE OF COLLECTION: ABNORMAL
EOSINOPHIL # BLD: 0.06 K/UL (ref 0.05–0.5)
EOSINOPHILS RELATIVE PERCENT: 1 % (ref 0–6)
ERYTHROCYTE [DISTWIDTH] IN BLOOD BY AUTOMATED COUNT: 12.2 % (ref 11.5–15)
FIO2: 100 %
GFR SERPL CREATININE-BSD FRML MDRD: >60 ML/MIN/1.73M2
GLUCOSE BLD-MCNC: 105 MG/DL (ref 74–99)
GLUCOSE BLD-MCNC: 117 MG/DL (ref 74–99)
GLUCOSE BLD-MCNC: 121 MG/DL (ref 74–99)
GLUCOSE BLD-MCNC: 129 MG/DL (ref 74–99)
GLUCOSE BLD-MCNC: 96 MG/DL (ref 74–99)
GLUCOSE SERPL-MCNC: 149 MG/DL (ref 74–99)
HCO3: 22 MMOL/L (ref 22–26)
HCO3: 22.6 MMOL/L (ref 22–26)
HCT VFR BLD AUTO: 33.5 % (ref 34–48)
HGB BLD-MCNC: 10.8 G/DL (ref 11.5–15.5)
HHB: 0.2 % (ref 0–5)
HHB: 1 % (ref 0–5)
IMM GRANULOCYTES # BLD AUTO: 0.03 K/UL (ref 0–0.58)
IMM GRANULOCYTES NFR BLD: 0 % (ref 0–5)
LAB: ABNORMAL
LAB: ABNORMAL
LYMPHOCYTES NFR BLD: 1.33 K/UL (ref 1.5–4)
LYMPHOCYTES RELATIVE PERCENT: 17 % (ref 20–42)
Lab: ABNORMAL
Lab: ABNORMAL
MAGNESIUM SERPL-MCNC: 1.7 MG/DL (ref 1.6–2.6)
MCH RBC QN AUTO: 30.8 PG (ref 26–35)
MCHC RBC AUTO-ENTMCNC: 32.2 G/DL (ref 32–34.5)
MCV RBC AUTO: 95.4 FL (ref 80–99.9)
METHB: 0.3 % (ref 0–1.5)
METHB: 0.3 % (ref 0–1.5)
MODE: ABNORMAL
MODE: AC
MONOCYTES NFR BLD: 0.8 K/UL (ref 0.1–0.95)
MONOCYTES NFR BLD: 10 % (ref 2–12)
NEUTROPHILS NFR BLD: 72 % (ref 43–80)
NEUTS SEG NFR BLD: 5.79 K/UL (ref 1.8–7.3)
O2 CONTENT: 14.2 ML/DL
O2 CONTENT: 16 ML/DL
O2 SATURATION: 99 % (ref 92–98.5)
O2 SATURATION: 99.8 % (ref 92–98.5)
O2HB: 97.9 % (ref 94–97)
O2HB: 99.4 % (ref 94–97)
OPERATOR ID: 7294
OPERATOR ID: ABNORMAL
PATIENT TEMP: 37 C
PATIENT TEMP: 37 C
PCO2: 30.7 MMHG (ref 35–45)
PCO2: 32.6 MMHG (ref 35–45)
PEEP/CPAP: 5 CMH2O
PFO2: 4.82 MMHG/%
PH BLOOD GAS: 7.45 (ref 7.35–7.45)
PH BLOOD GAS: 7.49 (ref 7.35–7.45)
PLATELET # BLD AUTO: 290 K/UL (ref 130–450)
PMV BLD AUTO: 9 FL (ref 7–12)
PO2: 156.4 MMHG (ref 75–100)
PO2: 481.7 MMHG (ref 75–100)
POTASSIUM SERPL-SCNC: 3.8 MMOL/L (ref 3.5–5)
PROCALCITONIN SERPL-MCNC: 0.08 NG/ML (ref 0–0.08)
PROT SERPL-MCNC: 7.2 G/DL (ref 6.4–8.3)
RBC # BLD AUTO: 3.51 M/UL (ref 3.5–5.5)
RI(T): 0.42
RR MECHANICAL: 14 B/MIN
SODIUM SERPL-SCNC: 128 MMOL/L (ref 132–146)
SOURCE, BLOOD GAS: ABNORMAL
SOURCE, BLOOD GAS: ABNORMAL
THB: 10.1 G/DL (ref 11.5–16.5)
THB: 10.5 G/DL (ref 11.5–16.5)
TIME ANALYZED: 1300
TIME ANALYZED: 824
VT MECHANICAL: 400 ML
WBC OTHER # BLD: 8 K/UL (ref 4.5–11.5)

## 2023-08-16 PROCEDURE — 96375 TX/PRO/DX INJ NEW DRUG ADDON: CPT

## 2023-08-16 PROCEDURE — 74018 RADEX ABDOMEN 1 VIEW: CPT

## 2023-08-16 PROCEDURE — 6360000002 HC RX W HCPCS: Performed by: NURSE PRACTITIONER

## 2023-08-16 PROCEDURE — 6370000000 HC RX 637 (ALT 250 FOR IP): Performed by: NURSE PRACTITIONER

## 2023-08-16 PROCEDURE — 87081 CULTURE SCREEN ONLY: CPT

## 2023-08-16 PROCEDURE — 95819 EEG AWAKE AND ASLEEP: CPT

## 2023-08-16 PROCEDURE — 96376 TX/PRO/DX INJ SAME DRUG ADON: CPT

## 2023-08-16 PROCEDURE — 85610 PROTHROMBIN TIME: CPT

## 2023-08-16 PROCEDURE — 82962 GLUCOSE BLOOD TEST: CPT

## 2023-08-16 PROCEDURE — 6370000000 HC RX 637 (ALT 250 FOR IP): Performed by: INTERNAL MEDICINE

## 2023-08-16 PROCEDURE — 6360000002 HC RX W HCPCS

## 2023-08-16 PROCEDURE — 6360000002 HC RX W HCPCS: Performed by: INTERNAL MEDICINE

## 2023-08-16 PROCEDURE — 85025 COMPLETE CBC W/AUTO DIFF WBC: CPT

## 2023-08-16 PROCEDURE — 87086 URINE CULTURE/COLONY COUNT: CPT

## 2023-08-16 PROCEDURE — 0BH18EZ INSERTION OF ENDOTRACHEAL AIRWAY INTO TRACHEA, VIA NATURAL OR ARTIFICIAL OPENING ENDOSCOPIC: ICD-10-PCS | Performed by: INTERNAL MEDICINE

## 2023-08-16 PROCEDURE — 2580000003 HC RX 258: Performed by: INTERNAL MEDICINE

## 2023-08-16 PROCEDURE — 83735 ASSAY OF MAGNESIUM: CPT

## 2023-08-16 PROCEDURE — 6360000002 HC RX W HCPCS: Performed by: STUDENT IN AN ORGANIZED HEALTH CARE EDUCATION/TRAINING PROGRAM

## 2023-08-16 PROCEDURE — 99222 1ST HOSP IP/OBS MODERATE 55: CPT | Performed by: INTERNAL MEDICINE

## 2023-08-16 PROCEDURE — 2000000000 HC ICU R&B

## 2023-08-16 PROCEDURE — 6370000000 HC RX 637 (ALT 250 FOR IP): Performed by: STUDENT IN AN ORGANIZED HEALTH CARE EDUCATION/TRAINING PROGRAM

## 2023-08-16 PROCEDURE — 94002 VENT MGMT INPAT INIT DAY: CPT

## 2023-08-16 PROCEDURE — 82550 ASSAY OF CK (CPK): CPT

## 2023-08-16 PROCEDURE — 82805 BLOOD GASES W/O2 SATURATION: CPT

## 2023-08-16 PROCEDURE — 2500000003 HC RX 250 WO HCPCS

## 2023-08-16 PROCEDURE — 5A1945Z RESPIRATORY VENTILATION, 24-96 CONSECUTIVE HOURS: ICD-10-PCS | Performed by: INTERNAL MEDICINE

## 2023-08-16 PROCEDURE — 6360000002 HC RX W HCPCS: Performed by: ORTHOPAEDIC SURGERY

## 2023-08-16 PROCEDURE — 99291 CRITICAL CARE FIRST HOUR: CPT | Performed by: INTERNAL MEDICINE

## 2023-08-16 PROCEDURE — 84145 PROCALCITONIN (PCT): CPT

## 2023-08-16 PROCEDURE — 71045 X-RAY EXAM CHEST 1 VIEW: CPT

## 2023-08-16 PROCEDURE — 80053 COMPREHEN METABOLIC PANEL: CPT

## 2023-08-16 PROCEDURE — 6370000000 HC RX 637 (ALT 250 FOR IP)

## 2023-08-16 PROCEDURE — 83605 ASSAY OF LACTIC ACID: CPT

## 2023-08-16 PROCEDURE — 36600 WITHDRAWAL OF ARTERIAL BLOOD: CPT

## 2023-08-16 PROCEDURE — 70450 CT HEAD/BRAIN W/O DYE: CPT

## 2023-08-16 PROCEDURE — 51702 INSERT TEMP BLADDER CATH: CPT

## 2023-08-16 PROCEDURE — 31500 INSERT EMERGENCY AIRWAY: CPT

## 2023-08-16 PROCEDURE — C9113 INJ PANTOPRAZOLE SODIUM, VIA: HCPCS | Performed by: INTERNAL MEDICINE

## 2023-08-16 PROCEDURE — 84100 ASSAY OF PHOSPHORUS: CPT

## 2023-08-16 PROCEDURE — A4216 STERILE WATER/SALINE, 10 ML: HCPCS | Performed by: INTERNAL MEDICINE

## 2023-08-16 PROCEDURE — 80048 BASIC METABOLIC PNL TOTAL CA: CPT

## 2023-08-16 PROCEDURE — 2580000003 HC RX 258: Performed by: NURSE PRACTITIONER

## 2023-08-16 PROCEDURE — 5A1935Z RESPIRATORY VENTILATION, LESS THAN 24 CONSECUTIVE HOURS: ICD-10-PCS | Performed by: INTERNAL MEDICINE

## 2023-08-16 PROCEDURE — 2580000003 HC RX 258: Performed by: STUDENT IN AN ORGANIZED HEALTH CARE EDUCATION/TRAINING PROGRAM

## 2023-08-16 RX ORDER — CHLORHEXIDINE GLUCONATE 0.12 MG/ML
15 RINSE ORAL 2 TIMES DAILY
Status: DISCONTINUED | OUTPATIENT
Start: 2023-08-16 | End: 2023-08-16 | Stop reason: HOSPADM

## 2023-08-16 RX ORDER — PROPOFOL 10 MG/ML
5-50 INJECTION, EMULSION INTRAVENOUS CONTINUOUS
Status: CANCELLED | OUTPATIENT
Start: 2023-08-16

## 2023-08-16 RX ORDER — LEVETIRACETAM 500 MG/5ML
1000 INJECTION, SOLUTION, CONCENTRATE INTRAVENOUS EVERY 12 HOURS
Status: CANCELLED | OUTPATIENT
Start: 2023-08-16

## 2023-08-16 RX ORDER — ETOMIDATE 2 MG/ML
20 INJECTION INTRAVENOUS ONCE
Status: COMPLETED | OUTPATIENT
Start: 2023-08-16 | End: 2023-08-16

## 2023-08-16 RX ORDER — MIDAZOLAM HYDROCHLORIDE 1 MG/ML
1-10 INJECTION, SOLUTION INTRAVENOUS CONTINUOUS
Status: DISCONTINUED | OUTPATIENT
Start: 2023-08-16 | End: 2023-08-18

## 2023-08-16 RX ORDER — MINERAL OIL AND WHITE PETROLATUM 150; 830 MG/G; MG/G
OINTMENT OPHTHALMIC EVERY 4 HOURS
Status: DISCONTINUED | OUTPATIENT
Start: 2023-08-16 | End: 2023-08-18

## 2023-08-16 RX ORDER — OXYCODONE HYDROCHLORIDE 5 MG/1
10 TABLET ORAL EVERY 4 HOURS PRN
Status: CANCELLED | OUTPATIENT
Start: 2023-08-16

## 2023-08-16 RX ORDER — AMLODIPINE BESYLATE 10 MG/1
10 TABLET ORAL DAILY
Status: CANCELLED | OUTPATIENT
Start: 2023-08-17

## 2023-08-16 RX ORDER — LORAZEPAM 2 MG/ML
2 INJECTION INTRAMUSCULAR ONCE
Status: DISCONTINUED | OUTPATIENT
Start: 2023-08-16 | End: 2023-08-16

## 2023-08-16 RX ORDER — AMLODIPINE BESYLATE 10 MG/1
10 TABLET ORAL DAILY
Status: DISCONTINUED | OUTPATIENT
Start: 2023-08-17 | End: 2023-08-21 | Stop reason: HOSPADM

## 2023-08-16 RX ORDER — BACLOFEN 10 MG/1
10 TABLET ORAL NIGHTLY
Status: CANCELLED | OUTPATIENT
Start: 2023-08-16

## 2023-08-16 RX ORDER — LORAZEPAM 2 MG/ML
1 INJECTION INTRAMUSCULAR EVERY 10 MIN PRN
Status: DISCONTINUED | OUTPATIENT
Start: 2023-08-16 | End: 2023-08-16 | Stop reason: HOSPADM

## 2023-08-16 RX ORDER — SODIUM CHLORIDE 9 MG/ML
INJECTION, SOLUTION INTRAVENOUS PRN
Status: DISCONTINUED | OUTPATIENT
Start: 2023-08-16 | End: 2023-08-16 | Stop reason: HOSPADM

## 2023-08-16 RX ORDER — KETOROLAC TROMETHAMINE 30 MG/ML
15 INJECTION, SOLUTION INTRAMUSCULAR; INTRAVENOUS EVERY 6 HOURS
Status: CANCELLED | OUTPATIENT
Start: 2023-08-16 | End: 2023-08-17

## 2023-08-16 RX ORDER — LORAZEPAM 2 MG/ML
1 INJECTION INTRAMUSCULAR EVERY 10 MIN PRN
Status: CANCELLED | OUTPATIENT
Start: 2023-08-16

## 2023-08-16 RX ORDER — DICYCLOMINE HYDROCHLORIDE 10 MG/1
20 CAPSULE ORAL 4 TIMES DAILY PRN
Status: CANCELLED | OUTPATIENT
Start: 2023-08-16

## 2023-08-16 RX ORDER — CHLORHEXIDINE GLUCONATE 0.12 MG/ML
15 RINSE ORAL 2 TIMES DAILY
Status: CANCELLED | OUTPATIENT
Start: 2023-08-16

## 2023-08-16 RX ORDER — LISINOPRIL 20 MG/1
40 TABLET ORAL DAILY
Status: CANCELLED | OUTPATIENT
Start: 2023-08-17

## 2023-08-16 RX ORDER — SODIUM CHLORIDE, SODIUM LACTATE, POTASSIUM CHLORIDE, CALCIUM CHLORIDE 600; 310; 30; 20 MG/100ML; MG/100ML; MG/100ML; MG/100ML
INJECTION, SOLUTION INTRAVENOUS CONTINUOUS
Status: CANCELLED | OUTPATIENT
Start: 2023-08-16

## 2023-08-16 RX ORDER — LEVETIRACETAM 500 MG/5ML
500 INJECTION, SOLUTION, CONCENTRATE INTRAVENOUS ONCE
Status: COMPLETED | OUTPATIENT
Start: 2023-08-16 | End: 2023-08-16

## 2023-08-16 RX ORDER — SODIUM CHLORIDE 0.9 % (FLUSH) 0.9 %
5-40 SYRINGE (ML) INJECTION PRN
Status: DISCONTINUED | OUTPATIENT
Start: 2023-08-16 | End: 2023-08-16 | Stop reason: HOSPADM

## 2023-08-16 RX ORDER — SODIUM CHLORIDE 0.9 % (FLUSH) 0.9 %
5-40 SYRINGE (ML) INJECTION PRN
Status: CANCELLED | OUTPATIENT
Start: 2023-08-16

## 2023-08-16 RX ORDER — ENOXAPARIN SODIUM 100 MG/ML
40 INJECTION SUBCUTANEOUS DAILY
Status: DISCONTINUED | OUTPATIENT
Start: 2023-08-16 | End: 2023-08-16 | Stop reason: HOSPADM

## 2023-08-16 RX ORDER — CHLORHEXIDINE GLUCONATE ORAL RINSE 1.2 MG/ML
15 SOLUTION DENTAL 2 TIMES DAILY
Status: DISCONTINUED | OUTPATIENT
Start: 2023-08-16 | End: 2023-08-18

## 2023-08-16 RX ORDER — ONDANSETRON 4 MG/1
4 TABLET, ORALLY DISINTEGRATING ORAL EVERY 8 HOURS PRN
Status: CANCELLED | OUTPATIENT
Start: 2023-08-16

## 2023-08-16 RX ORDER — HEPARIN SODIUM 10000 [USP'U]/ML
5000 INJECTION, SOLUTION INTRAVENOUS; SUBCUTANEOUS EVERY 12 HOURS
Status: DISCONTINUED | OUTPATIENT
Start: 2023-08-16 | End: 2023-08-18

## 2023-08-16 RX ORDER — GABAPENTIN 400 MG/1
800 CAPSULE ORAL 3 TIMES DAILY
Status: DISCONTINUED | OUTPATIENT
Start: 2023-08-16 | End: 2023-08-21 | Stop reason: HOSPADM

## 2023-08-16 RX ORDER — BACLOFEN 10 MG/1
10 TABLET ORAL NIGHTLY
Status: DISCONTINUED | OUTPATIENT
Start: 2023-08-16 | End: 2023-08-21 | Stop reason: HOSPADM

## 2023-08-16 RX ORDER — LORAZEPAM 2 MG/ML
2 INJECTION INTRAMUSCULAR ONCE
Status: COMPLETED | OUTPATIENT
Start: 2023-08-16 | End: 2023-08-16

## 2023-08-16 RX ORDER — SODIUM CHLORIDE, SODIUM LACTATE, POTASSIUM CHLORIDE, CALCIUM CHLORIDE 600; 310; 30; 20 MG/100ML; MG/100ML; MG/100ML; MG/100ML
INJECTION, SOLUTION INTRAVENOUS CONTINUOUS
Status: DISCONTINUED | OUTPATIENT
Start: 2023-08-16 | End: 2023-08-16 | Stop reason: HOSPADM

## 2023-08-16 RX ORDER — SODIUM CHLORIDE 0.9 % (FLUSH) 0.9 %
5-40 SYRINGE (ML) INJECTION EVERY 12 HOURS SCHEDULED
Status: CANCELLED | OUTPATIENT
Start: 2023-08-16

## 2023-08-16 RX ORDER — ENOXAPARIN SODIUM 100 MG/ML
40 INJECTION SUBCUTANEOUS DAILY
Status: DISCONTINUED | OUTPATIENT
Start: 2023-08-17 | End: 2023-08-16

## 2023-08-16 RX ORDER — LORAZEPAM 2 MG/ML
INJECTION INTRAMUSCULAR
Status: COMPLETED
Start: 2023-08-16 | End: 2023-08-16

## 2023-08-16 RX ORDER — LEVETIRACETAM 500 MG/5ML
1000 INJECTION, SOLUTION, CONCENTRATE INTRAVENOUS EVERY 12 HOURS
Status: DISCONTINUED | OUTPATIENT
Start: 2023-08-16 | End: 2023-08-16 | Stop reason: HOSPADM

## 2023-08-16 RX ORDER — MIDAZOLAM HYDROCHLORIDE 1 MG/ML
1-10 INJECTION, SOLUTION INTRAVENOUS CONTINUOUS
Status: DISCONTINUED | OUTPATIENT
Start: 2023-08-16 | End: 2023-08-16 | Stop reason: HOSPADM

## 2023-08-16 RX ORDER — ETOMIDATE 2 MG/ML
INJECTION INTRAVENOUS
Status: COMPLETED
Start: 2023-08-16 | End: 2023-08-16

## 2023-08-16 RX ORDER — ONDANSETRON 2 MG/ML
4 INJECTION INTRAMUSCULAR; INTRAVENOUS EVERY 6 HOURS PRN
Status: CANCELLED | OUTPATIENT
Start: 2023-08-16

## 2023-08-16 RX ORDER — GABAPENTIN 400 MG/1
800 CAPSULE ORAL 3 TIMES DAILY
Status: CANCELLED | OUTPATIENT
Start: 2023-08-16

## 2023-08-16 RX ORDER — MIDAZOLAM HYDROCHLORIDE 1 MG/ML
1-10 INJECTION, SOLUTION INTRAVENOUS CONTINUOUS
Status: CANCELLED | OUTPATIENT
Start: 2023-08-16

## 2023-08-16 RX ORDER — LEVETIRACETAM 500 MG/5ML
1000 INJECTION, SOLUTION, CONCENTRATE INTRAVENOUS EVERY 12 HOURS
Status: DISCONTINUED | OUTPATIENT
Start: 2023-08-17 | End: 2023-08-17

## 2023-08-16 RX ORDER — OXCARBAZEPINE 300 MG/1
600 TABLET, FILM COATED ORAL 3 TIMES DAILY
Status: DISCONTINUED | OUTPATIENT
Start: 2023-08-16 | End: 2023-08-21 | Stop reason: HOSPADM

## 2023-08-16 RX ORDER — OXCARBAZEPINE 300 MG/1
600 TABLET, FILM COATED ORAL 3 TIMES DAILY
Status: CANCELLED | OUTPATIENT
Start: 2023-08-16

## 2023-08-16 RX ORDER — ENOXAPARIN SODIUM 100 MG/ML
40 INJECTION SUBCUTANEOUS DAILY
Status: CANCELLED | OUTPATIENT
Start: 2023-08-17

## 2023-08-16 RX ORDER — PROPOFOL 10 MG/ML
5-50 INJECTION, EMULSION INTRAVENOUS CONTINUOUS
Status: DISCONTINUED | OUTPATIENT
Start: 2023-08-16 | End: 2023-08-18

## 2023-08-16 RX ORDER — SODIUM CHLORIDE 9 MG/ML
INJECTION, SOLUTION INTRAVENOUS PRN
Status: CANCELLED | OUTPATIENT
Start: 2023-08-16

## 2023-08-16 RX ORDER — SUCCINYLCHOLINE CHLORIDE 20 MG/ML
INJECTION INTRAMUSCULAR; INTRAVENOUS
Status: COMPLETED
Start: 2023-08-16 | End: 2023-08-16

## 2023-08-16 RX ORDER — LEVETIRACETAM 500 MG/5ML
1000 INJECTION, SOLUTION, CONCENTRATE INTRAVENOUS ONCE
Status: COMPLETED | OUTPATIENT
Start: 2023-08-16 | End: 2023-08-16

## 2023-08-16 RX ORDER — POLYVINYL ALCOHOL 14 MG/ML
1 SOLUTION/ DROPS OPHTHALMIC EVERY 4 HOURS
Status: DISCONTINUED | OUTPATIENT
Start: 2023-08-16 | End: 2023-08-18

## 2023-08-16 RX ORDER — PROPOFOL 10 MG/ML
5-50 INJECTION, EMULSION INTRAVENOUS CONTINUOUS
Status: DISCONTINUED | OUTPATIENT
Start: 2023-08-16 | End: 2023-08-16 | Stop reason: HOSPADM

## 2023-08-16 RX ORDER — MECLIZINE HCL 12.5 MG/1
12.5 TABLET ORAL 3 TIMES DAILY PRN
Status: CANCELLED | OUTPATIENT
Start: 2023-08-16

## 2023-08-16 RX ORDER — TRAZODONE HYDROCHLORIDE 50 MG/1
50 TABLET ORAL NIGHTLY PRN
Status: CANCELLED | OUTPATIENT
Start: 2023-08-16

## 2023-08-16 RX ORDER — SODIUM CHLORIDE 0.9 % (FLUSH) 0.9 %
5-40 SYRINGE (ML) INJECTION EVERY 12 HOURS SCHEDULED
Status: DISCONTINUED | OUTPATIENT
Start: 2023-08-16 | End: 2023-08-16 | Stop reason: HOSPADM

## 2023-08-16 RX ORDER — BACLOFEN 10 MG/1
10 TABLET ORAL NIGHTLY
Status: DISCONTINUED | OUTPATIENT
Start: 2023-08-16 | End: 2023-08-16 | Stop reason: HOSPADM

## 2023-08-16 RX ORDER — SUCCINYLCHOLINE CHLORIDE 20 MG/ML
100 INJECTION INTRAMUSCULAR; INTRAVENOUS ONCE
Status: COMPLETED | OUTPATIENT
Start: 2023-08-16 | End: 2023-08-16

## 2023-08-16 RX ORDER — OXYCODONE HYDROCHLORIDE 5 MG/1
5 TABLET ORAL EVERY 4 HOURS PRN
Status: CANCELLED | OUTPATIENT
Start: 2023-08-16

## 2023-08-16 RX ADMIN — SODIUM CHLORIDE 4000 MG: 9 INJECTION, SOLUTION INTRAVENOUS at 21:19

## 2023-08-16 RX ADMIN — PROPOFOL 20 MCG/KG/MIN: 10 INJECTION, EMULSION INTRAVENOUS at 13:33

## 2023-08-16 RX ADMIN — KETOROLAC TROMETHAMINE 15 MG: 30 INJECTION, SOLUTION INTRAMUSCULAR; INTRAVENOUS at 02:31

## 2023-08-16 RX ADMIN — SODIUM CHLORIDE 1200 MG PE: 9 INJECTION, SOLUTION INTRAVENOUS at 11:33

## 2023-08-16 RX ADMIN — Medication 6 MG/HR: at 21:59

## 2023-08-16 RX ADMIN — IBUPROFEN 400 MG: 100 SUSPENSION ORAL at 18:14

## 2023-08-16 RX ADMIN — SODIUM CHLORIDE, POTASSIUM CHLORIDE, SODIUM LACTATE AND CALCIUM CHLORIDE: 600; 310; 30; 20 INJECTION, SOLUTION INTRAVENOUS at 18:25

## 2023-08-16 RX ADMIN — BACLOFEN 10 MG: 10 TABLET ORAL at 23:07

## 2023-08-16 RX ADMIN — POLYVINYL ALCOHOL 1 DROP: 14 SOLUTION/ DROPS OPHTHALMIC at 21:32

## 2023-08-16 RX ADMIN — 0.12% CHLORHEXIDINE GLUCONATE 15 ML: 1.2 RINSE ORAL at 13:12

## 2023-08-16 RX ADMIN — ETOMIDATE 20 MG: 20 INJECTION, SOLUTION INTRAVENOUS at 11:53

## 2023-08-16 RX ADMIN — SUCCINYLCHOLINE CHLORIDE 100 MG: 20 INJECTION, SOLUTION INTRAMUSCULAR; INTRAVENOUS at 11:54

## 2023-08-16 RX ADMIN — LEVETIRACETAM 500 MG: 100 INJECTION, SOLUTION INTRAVENOUS at 10:44

## 2023-08-16 RX ADMIN — Medication 10 ML: at 10:09

## 2023-08-16 RX ADMIN — LORAZEPAM 2 MG: 2 INJECTION INTRAMUSCULAR; INTRAVENOUS at 11:40

## 2023-08-16 RX ADMIN — LORAZEPAM 2 MG: 2 INJECTION INTRAMUSCULAR; INTRAVENOUS at 10:42

## 2023-08-16 RX ADMIN — OXCARBAZEPINE 600 MG: 300 TABLET, FILM COATED ORAL at 21:28

## 2023-08-16 RX ADMIN — OXYCODONE HYDROCHLORIDE 10 MG: 5 TABLET ORAL at 01:07

## 2023-08-16 RX ADMIN — ETOMIDATE 20 MG: 2 INJECTION INTRAVENOUS at 11:53

## 2023-08-16 RX ADMIN — LORAZEPAM 2 MG: 2 INJECTION INTRAMUSCULAR; INTRAVENOUS at 10:08

## 2023-08-16 RX ADMIN — SODIUM CHLORIDE, POTASSIUM CHLORIDE, SODIUM LACTATE AND CALCIUM CHLORIDE: 600; 310; 30; 20 INJECTION, SOLUTION INTRAVENOUS at 10:50

## 2023-08-16 RX ADMIN — HEPARIN SODIUM 5000 UNITS: 10000 INJECTION INTRAVENOUS; SUBCUTANEOUS at 21:11

## 2023-08-16 RX ADMIN — PROPOFOL 20 MCG/KG/MIN: 10 INJECTION, EMULSION INTRAVENOUS at 21:56

## 2023-08-16 RX ADMIN — GABAPENTIN 800 MG: 300 CAPSULE ORAL at 21:27

## 2023-08-16 RX ADMIN — LORAZEPAM: 2 INJECTION INTRAMUSCULAR; INTRAVENOUS at 08:02

## 2023-08-16 RX ADMIN — LEVETIRACETAM 1000 MG: 100 INJECTION INTRAVENOUS at 08:08

## 2023-08-16 RX ADMIN — Medication 2 MG/HR: at 12:08

## 2023-08-16 RX ADMIN — ENOXAPARIN SODIUM 40 MG: 100 INJECTION SUBCUTANEOUS at 10:45

## 2023-08-16 RX ADMIN — 0.12% CHLORHEXIDINE GLUCONATE 15 ML: 1.2 RINSE ORAL at 21:11

## 2023-08-16 RX ADMIN — LORAZEPAM 2 MG: 2 INJECTION INTRAMUSCULAR; INTRAVENOUS at 07:56

## 2023-08-16 RX ADMIN — SODIUM CHLORIDE 40 MG: 9 INJECTION, SOLUTION INTRAMUSCULAR; INTRAVENOUS; SUBCUTANEOUS at 13:12

## 2023-08-16 RX ADMIN — SUCCINYLCHOLINE CHLORIDE 100 MG: 20 INJECTION INTRAMUSCULAR; INTRAVENOUS at 11:54

## 2023-08-16 ASSESSMENT — PAIN DESCRIPTION - ORIENTATION
ORIENTATION: RIGHT
ORIENTATION: RIGHT

## 2023-08-16 ASSESSMENT — PAIN DESCRIPTION - FREQUENCY
FREQUENCY: CONTINUOUS
FREQUENCY: CONTINUOUS

## 2023-08-16 ASSESSMENT — PULMONARY FUNCTION TESTS
PIF_VALUE: 14
PIF_VALUE: 14
PIF_VALUE: 20
PIF_VALUE: 17
PIF_VALUE: 18
PIF_VALUE: 16
PIF_VALUE: 24
PIF_VALUE: 17
PIF_VALUE: 18
PIF_VALUE: 18
PIF_VALUE: 17
PIF_VALUE: 20
PIF_VALUE: 17
PIF_VALUE: 31
PIF_VALUE: 14
PIF_VALUE: 19
PIF_VALUE: 24
PIF_VALUE: 16
PIF_VALUE: 22
PIF_VALUE: 19

## 2023-08-16 ASSESSMENT — PAIN SCALES - GENERAL
PAINLEVEL_OUTOF10: 4
PAINLEVEL_OUTOF10: 0
PAINLEVEL_OUTOF10: 8

## 2023-08-16 ASSESSMENT — PAIN - FUNCTIONAL ASSESSMENT
PAIN_FUNCTIONAL_ASSESSMENT: ACTIVITIES ARE NOT PREVENTED
PAIN_FUNCTIONAL_ASSESSMENT: ACTIVITIES ARE NOT PREVENTED

## 2023-08-16 ASSESSMENT — PAIN DESCRIPTION - LOCATION
LOCATION: HIP
LOCATION: HIP

## 2023-08-16 ASSESSMENT — PAIN DESCRIPTION - PAIN TYPE
TYPE: SURGICAL PAIN
TYPE: SURGICAL PAIN

## 2023-08-16 ASSESSMENT — PAIN DESCRIPTION - DESCRIPTORS
DESCRIPTORS: BURNING;ACHING
DESCRIPTORS: ACHING;DULL

## 2023-08-16 ASSESSMENT — PAIN DESCRIPTION - ONSET
ONSET: ON-GOING
ONSET: ON-GOING

## 2023-08-16 NOTE — PATIENT CARE CONFERENCE
Intensive Care Daily Quality Rounding Checklist      ICU Team Members: Dr. Amaya Qureshi, Dr. Sarai Carlson (resident), clinical pharmacist, charge nurse, bedside nurse, respiratory therapist    ICU Day #: NUMBER: 1    Intubation Date: N/A    Ventilator Day #: N/A    Central Line Insertion Date: N/A        Day #: N/A        Indication: N/A     Arterial Line Insertion Date: N/A      Day #: N/A    Temporary Hemodialysis Catheter Insertion Date: N/A      Day # N/A    DVT Prophylaxis: Lovenox    GI Prophylaxis: Protonix    Singleton Catheter Insertion Date: N/A       Day #: N/A      Indications: N/A      Continued need (if yes, reason documented and discussed with physician): N/A    Skin Issues/ Wounds and ordered treatment discussed on rounds: has surgical incision    Goals/ Plans for the Day: Daily labs, monitor for further seizures, intubate if unable to cease, EEG currently, arrange transfer to [facility]

## 2023-08-16 NOTE — CONSULTS
Critical Care Admit/Consult Note         Patient - Allison Smith   MRN -  [id number]   Acct # - [de-identified]   - 1955      Date of Admission -  2023  6:35 AM  Date of evaluation -  2023  [address] Day - 0          ADMIT/CONSULT DETAILS     Reason for Admit/Consult   Status epilepticus     Consulting Service/Physician   Consulting - Cedrick Rasheed MD  Primary Care Physician - Jn Velazquez MD         \[facility] [place] [facility]\""   The patient is a 74 y.o. female with significant past medical history of hypertension, neuropathy, trigeminal neuralgia, depression, who is POD# 2 from total hip replacement. RRT was called earlier today d/t seizure activity. The patient had a witnessed 10 minute tonic seizure s/p 4mg Ativan and Keppra 1500mg. She was post ictal upon arrival to the ICU and then had multiple additional seizures s/p additional ativan and phos phenytoin load. Ultimately she continued to have seizures and required intubation and versed/propofol infusions. She did have a brief EEG today. Plans for transfer to [facility]. Additional history provided by the  over the phone reveals that she may have had seizures in the past.  She was reportedly hospitalized in [place] about 1 month ago for [employer]. \"    Takes baclofen at home - reportedly the dose was reduced to once daily as of May 2023. She also takes Trileptal and gabapentin for trigeminal neuralgia.          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

## 2023-08-16 NOTE — SIGNIFICANT EVENT
Rapid response called at 7:54 AM for seizure activity. Upon arrival, patient had significant rigidity with tonic clonic movements. Patient unresponsive, pupils equal and reactive. Autonomic instability per nursing staff, with blood pressure elevated and patient tachycardic  2 mg Ativan given, with minimal response. 2 additional milligram Ativan given. Patient slightly improved and started follow commands. Still lethargic. Loading dose of Keppra given 1 g. Unasyn started for aspiration pneumonia coverage. Stat CT head performed without any significant findings. Spoke with intensivist Dr. Jennie Severino, patient excepted for transfer to the ICU. Patient has no history of seizures. Admitted to the hospital and had a total right hip replacement with orthopedic surgery 2 days prior. No complications of procedure. Patient was alert and oriented and responding to questions earlier in the morning per nursing staff. Patient takes baclofen chronically, 1 dose given yesterday 10 mg. Patient prescribed baclofen at home 3 times a day as needed, unsure how much she takes at home. Patient also takes Trileptal at home for neuropathic pain? Assessment and plan:    New onset seizures, possibly secondary to baclofen withdrawal versus other. No significant structural abnormalities on CT head. Patient afebrile with no signs of any infection. Obtaining labs to check electrolytes. EEG ordered. Consider ordering MRI brain. Continue seizure prophylactic Keppra. Obtain CK. N.p.o., seizure precautions. Ativan as needed for seizures. Rest factors: Include trigeminal neuralgia and depression. Patient no head trauma.,  No CNS infection, no family history of seizures, no developmental delay, no febrile seizures, no CNS tumors, no CNS vascular disease. She was evaluated at [facility] in 2014 for possible seizures vs pseudoseizures. EEG and video EEG negative for seizures. CT head unremarkable.     Concern for aspiration

## 2023-08-16 NOTE — PLAN OF CARE
Patient is admitted to Dr Randal Jenkins, hospitalist service to the MICU requiring neurology consultation

## 2023-08-16 NOTE — CONSULTS
[facility]      Reason for Consult:  Medical management    History of Present Illness: This is a 74year old female with past medical history of depression, trigeminal neuralgia, and right hip OA presents to the hospital for total right hip procedure which was completed on 8/73 with no complications. Patient was to be discharged today when she had a 10 minute tonic clonic seizure with concomitant post ictal state. Patient has had no history of seizure in the past. She has not received any baclofen nor has there been witnessed overdose of gabapentin. Please see RRT note in regards to medications given during the RRT. As per RN, patient completely at her baseline prior to this. Unable to get more or ROS as she is somnolent likely from the benzodiazepine that she had received. 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     follows with PCP    Arthritis     Depression     Self abuse    Difficult intravenous access     History of suicide attempt 01/14/2022 2004    Hypertension     Ingestion of substance 01/14/2022 2004    Trigeminal neuralgia 2005    diagnosed       Surgical History:  Past Surgical History:   Procedure Laterality Date    BRAIN SURGERY      decompression trigeminal nerve    CARPAL TUNNEL RELEASE Bilateral     FACIAL NERVE DECOMPRESSION      JOINT REPLACEMENT      WILMAN AND BSO (CERVIX REMOVED)      TOTAL HIP ARTHROPLASTY Right 8/14/2023    RIGHT TOTAL HIP ARTHROPLASTY performed by Cintia Zaldivar MD at [facility] OR       Medications Prior to Admission:    Prior to Admission medications    Medication Sig Start Date End Date Taking?  Authorizing Provider   aspirin (ASPIRIN CHILDRENS) 81 MG chewable tablet Take 1 tablet by mouth 2 times daily for 28 days

## 2023-08-16 NOTE — PLAN OF CARE
Problem: Discharge Planning  Goal: Discharge to home or other facility with appropriate resources  8/16/2023 1629 by Crista Soto RN  Outcome: Progressing  Problem: Pain  Goal: Verbalizes/displays adequate comfort level or baseline comfort level  8/16/2023 1629 by Crista Soto RN  Outcome: Progressing    Problem: ABCDS Injury Assessment  Goal: Absence of physical injury  8/16/2023 0513 by Cherie Maloney RN  Outcome: Progressing     Problem: Skin/Tissue Integrity  Goal: Absence of new skin breakdown  Description: 1. Monitor for areas of redness and/or skin breakdown  2. Assess vascular access sites hourly  3. Every 4-6 hours minimum:  Change oxygen saturation probe site  4. Every 4-6 hours:  If on nasal continuous positive airway pressure, respiratory therapy assess nares and determine need for appliance change or resting period.   Outcome: Progressing

## 2023-08-16 NOTE — PROCEDURES
EEG Report  Atif Cornelius is a 74 y.o. female      Appointment Date 08/16/2023 Appointment Time 0930   Facility Location [place] EEG Number    Type of Study EEG Floor 216-01 ICU     Technical Specifications  Technician [address] of consciousness No response, asleep   Sleep deprived? ?    Hyperventilation tested? no   Photic stim tested? no   EEG recording Standard 10-20 electrode placement    Duration of recording 25+   EEG complete? yes       Clinical History   ***    Medications    Current Facility-Administered Medications:     levETIRAcetam (KEPPRA) injection 1,000 mg, 1,000 mg, IntraVENous, Q12H, Jazmin Shelton, DO    LORazepam (ATIVAN) injection 1 mg, 1 mg, IntraVENous, Q10 Min PRN, Jazmin Shelton, DO    levETIRAcetam (KEPPRA) injection 500 mg, 500 mg, IntraVENous, Once, Geri Lester MD, 500 mg at 08/16/23 1044    enoxaparin (LOVENOX) injection 40 mg, 40 mg, SubCUTAneous, Daily, Geri Lester MD, 40 mg at 08/16/23 1045    baclofen (LIORESAL) tablet 10 mg, 10 mg, Oral, Nightly, Geri Lester MD    lactated ringers IV soln infusion, , IntraVENous, Continuous, Geri Lester MD    fosphenytoin (CEREBYX) 1,200 mg PE in sodium chloride 0.9 % 100 mL IVPB (loading dose), 1,200 mg PE, IntraVENous, Once, Geri Lester MD    amLODIPine (NORVASC) tablet 10 mg, 10 mg, Oral, Daily, Tariq Desai, APRN - CNP, 10 mg at 08/15/23 0859    dicyclomine (BENTYL) capsule 20 mg, 20 mg, Oral, 4x Daily PRN, Gerlene Sprain Colaluca, APRN - CNP    gabapentin (NEURONTIN) capsule 800 mg, 800 mg, Oral, TID, Gerlene Sprain Colaluca, APRN - CNP, 800 mg at 08/15/23 2105    lisinopril (PRINIVIL;ZESTRIL) tablet 40 mg, 40 mg, Oral, Daily, Gerlene Sprain Colaluca, APRN - CNP, 40 mg at 08/15/23 0858    meclizine (ANTIVERT) tablet 12.5 mg, 12.5 mg, Oral, TID PRN, NABOR Estrella CNP, 12.5 mg at 08/15/23 0858    OXcarbazepine (TRILEPTAL) tablet 600 mg, 600 mg, Oral, TID, NABOR Estrella CNP, 600 mg at 08/15/23 6890

## 2023-08-16 NOTE — PLAN OF CARE
Discharge readmit orders have been placed. However DC summary will be done by primary. Patient is being transferred to [facility].

## 2023-08-16 NOTE — CONSULTS
Comprehensive Nutrition Assessment    Type and Reason for Visit:  Initial, Consult (TF Recommendation Only - Provider to Manage)    Nutrition Recommendations/Plan:   If pt remains intubated and EN needed for nutrition support, recommend:    TF RECOMMENDATION: Immune Enhancing TF (Pivot 1.5) to goal rate 35 ml/hr and 30 ml water flushes Q 4 hr  This will provide: 840 ml/d, 1260 maria isabel, 79 g pro, 818 ml total free water  This regimen will meet 100% energy and protein needs     Malnutrition Assessment:  Malnutrition Status: At risk for malnutrition (Comment) (increased nutrient needs for postop healing) (08/16/23 1600)    Context:  Acute Illness     Findings of the 6 clinical characteristics of malnutrition:  Energy Intake:  75% or less of estimated energy requirements for 7 or more days  Weight Loss:  No significant weight loss     Body Fat Loss:  Unable to assess     Muscle Mass Loss:  Unable to assess    Fluid Accumulation:  No significant fluid accumulation     Strength:  Not Performed    Nutrition Assessment:    Pt postop 8/14 total hip replacement d/t OA. Currently s/p RRT for change in mental status and possible seizure. Intubated/sedated. Plan for transfer to [facility] for Neurology. Will provide TF Recommendation, in case EN support to start prior to transfer to [facility]    Nutrition Related Findings:    intubated/sedated, NGT clamped, soft abd +BS, trace edema, dentures, hyponatremia Wound Type: Surgical Incision (abrasion, reddened buttock)       Current Nutrition Intake & Therapies:    Average Meal Intake: NPO  Average Supplements Intake: NPO  Diet NPO Exceptions are: Sips of Water with Meds    Anthropometric Measures:  Height: 5' 3\" (160 cm)  Ideal Body Weight (IBW): 115 lbs (52 kg)    Admission Body Weight: 135 lb (61.2 kg) (8/15)  Current Body Weight: 138 lb 14.2 oz (63 kg), 120.8 % IBW.  Weight Source: Bed Scale (8/16)  Current BMI (kg/m2): 24.6  Usual Body Weight: 149 lb 4 oz (67.7 kg) (per EMR x 3 mo ago)  %

## 2023-08-17 ENCOUNTER — APPOINTMENT (OUTPATIENT)
Dept: NEUROLOGY | Age: 68
DRG: 101 | End: 2023-08-17
Attending: STUDENT IN AN ORGANIZED HEALTH CARE EDUCATION/TRAINING PROGRAM
Payer: MEDICARE

## 2023-08-17 ENCOUNTER — APPOINTMENT (OUTPATIENT)
Dept: GENERAL RADIOLOGY | Age: 68
DRG: 101 | End: 2023-08-17
Attending: STUDENT IN AN ORGANIZED HEALTH CARE EDUCATION/TRAINING PROGRAM
Payer: MEDICARE

## 2023-08-17 LAB
AADO2: 51.6 MMHG
AADO2: 82.8 MMHG
ALBUMIN SERPL-MCNC: 2.8 G/DL (ref 3.5–5.2)
ALP SERPL-CCNC: 94 U/L (ref 35–104)
ALT SERPL-CCNC: 9 U/L (ref 0–32)
AMPHET UR QL SCN: NEGATIVE
ANION GAP SERPL CALCULATED.3IONS-SCNC: 12 MMOL/L (ref 7–16)
ANION GAP SERPL CALCULATED.3IONS-SCNC: 12 MMOL/L (ref 7–16)
ANION GAP SERPL CALCULATED.3IONS-SCNC: 13 MMOL/L (ref 7–16)
ANION GAP SERPL CALCULATED.3IONS-SCNC: 8 MMOL/L (ref 7–16)
ANION GAP SERPL CALCULATED.3IONS-SCNC: 9 MMOL/L (ref 7–16)
AST SERPL-CCNC: 18 U/L (ref 0–31)
B.E.: 0.4 MMOL/L (ref -3–3)
B.E.: 1.3 MMOL/L (ref -3–3)
BARBITURATES UR QL SCN: NEGATIVE
BASOPHILS # BLD: 0.02 K/UL (ref 0–0.2)
BASOPHILS # BLD: 0.03 K/UL (ref 0–0.2)
BASOPHILS NFR BLD: 0 % (ref 0–2)
BASOPHILS NFR BLD: 0 % (ref 0–2)
BENZODIAZ UR QL: POSITIVE
BILIRUB SERPL-MCNC: 0.4 MG/DL (ref 0–1.2)
BUN SERPL-MCNC: 10 MG/DL (ref 6–23)
BUN SERPL-MCNC: 12 MG/DL (ref 6–23)
BUN SERPL-MCNC: 15 MG/DL (ref 6–23)
BUN SERPL-MCNC: 16 MG/DL (ref 6–23)
BUN SERPL-MCNC: 9 MG/DL (ref 6–23)
BUPRENORPHINE UR QL: NEGATIVE
CALCIUM SERPL-MCNC: 8 MG/DL (ref 8.6–10.2)
CALCIUM SERPL-MCNC: 8.1 MG/DL (ref 8.6–10.2)
CALCIUM SERPL-MCNC: 8.1 MG/DL (ref 8.6–10.2)
CALCIUM SERPL-MCNC: 8.4 MG/DL (ref 8.6–10.2)
CALCIUM SERPL-MCNC: 8.5 MG/DL (ref 8.6–10.2)
CANNABINOIDS UR QL SCN: POSITIVE
CHLORIDE SERPL-SCNC: 94 MMOL/L (ref 98–107)
CHLORIDE SERPL-SCNC: 95 MMOL/L (ref 98–107)
CHLORIDE SERPL-SCNC: 95 MMOL/L (ref 98–107)
CHLORIDE SERPL-SCNC: 97 MMOL/L (ref 98–107)
CHLORIDE SERPL-SCNC: 97 MMOL/L (ref 98–107)
CO2 SERPL-SCNC: 21 MMOL/L (ref 22–29)
CO2 SERPL-SCNC: 21 MMOL/L (ref 22–29)
CO2 SERPL-SCNC: 22 MMOL/L (ref 22–29)
CO2 SERPL-SCNC: 24 MMOL/L (ref 22–29)
CO2 SERPL-SCNC: 25 MMOL/L (ref 22–29)
COCAINE UR QL SCN: NEGATIVE
COHB: 0.3 % (ref 0–1.5)
COHB: 0.6 % (ref 0–1.5)
CREAT SERPL-MCNC: 0.3 MG/DL (ref 0.5–1)
CREAT SERPL-MCNC: 0.3 MG/DL (ref 0.5–1)
CREAT SERPL-MCNC: 0.4 MG/DL (ref 0.5–1)
CREAT SERPL-MCNC: 0.5 MG/DL (ref 0.5–1)
CREAT SERPL-MCNC: 0.5 MG/DL (ref 0.5–1)
CREAT UR-MCNC: 136 MG/DL (ref 29–226)
CRITICAL: ABNORMAL
CRITICAL: ABNORMAL
DATE ANALYZED: ABNORMAL
DATE ANALYZED: ABNORMAL
DATE OF COLLECTION: ABNORMAL
DATE OF COLLECTION: ABNORMAL
EOSINOPHIL # BLD: 0.08 K/UL (ref 0.05–0.5)
EOSINOPHIL # BLD: 0.12 K/UL (ref 0.05–0.5)
EOSINOPHILS RELATIVE PERCENT: 1 % (ref 0–6)
EOSINOPHILS RELATIVE PERCENT: 2 % (ref 0–6)
ERYTHROCYTE [DISTWIDTH] IN BLOOD BY AUTOMATED COUNT: 12.1 % (ref 11.5–15)
ERYTHROCYTE [DISTWIDTH] IN BLOOD BY AUTOMATED COUNT: 12.2 % (ref 11.5–15)
FENTANYL UR QL: NEGATIVE
FERRITIN SERPL-MCNC: 388 NG/ML
FIO2: 35 %
FIO2: 35 %
FOLATE SERPL-MCNC: 16.1 NG/ML (ref 4.8–24.2)
GFR SERPL CREATININE-BSD FRML MDRD: >60 ML/MIN/1.73M2
GLUCOSE BLD-MCNC: 105 MG/DL (ref 74–99)
GLUCOSE BLD-MCNC: 105 MG/DL (ref 74–99)
GLUCOSE BLD-MCNC: 125 MG/DL (ref 74–99)
GLUCOSE SERPL-MCNC: 100 MG/DL (ref 74–99)
GLUCOSE SERPL-MCNC: 103 MG/DL (ref 74–99)
GLUCOSE SERPL-MCNC: 107 MG/DL (ref 74–99)
GLUCOSE SERPL-MCNC: 111 MG/DL (ref 74–99)
GLUCOSE SERPL-MCNC: 117 MG/DL (ref 74–99)
HCO3: 23.4 MMOL/L (ref 22–26)
HCO3: 24.8 MMOL/L (ref 22–26)
HCT VFR BLD AUTO: 24.9 % (ref 34–48)
HCT VFR BLD AUTO: 28.4 % (ref 34–48)
HGB BLD-MCNC: 8.5 G/DL (ref 11.5–15.5)
HGB BLD-MCNC: 9.2 G/DL (ref 11.5–15.5)
HHB: 1.2 % (ref 0–5)
HHB: 1.9 % (ref 0–5)
IMM GRANULOCYTES # BLD AUTO: <0.03 K/UL (ref 0–0.58)
IMM GRANULOCYTES # BLD AUTO: <0.03 K/UL (ref 0–0.58)
IMM GRANULOCYTES NFR BLD: 0 % (ref 0–5)
IMM GRANULOCYTES NFR BLD: 0 % (ref 0–5)
INR PPP: 1.5
LAB: ABNORMAL
LAB: ABNORMAL
LACTATE BLDV-SCNC: 1.1 MMOL/L (ref 0.5–2.2)
LYMPHOCYTES NFR BLD: 1.17 K/UL (ref 1.5–4)
LYMPHOCYTES NFR BLD: 1.2 K/UL (ref 1.5–4)
LYMPHOCYTES RELATIVE PERCENT: 17 % (ref 20–42)
LYMPHOCYTES RELATIVE PERCENT: 18 % (ref 20–42)
Lab: ABNORMAL
Lab: ABNORMAL
MAGNESIUM SERPL-MCNC: 1.9 MG/DL (ref 1.6–2.6)
MAGNESIUM SERPL-MCNC: 2 MG/DL (ref 1.6–2.6)
MCH RBC QN AUTO: 30.7 PG (ref 26–35)
MCH RBC QN AUTO: 31.6 PG (ref 26–35)
MCHC RBC AUTO-ENTMCNC: 32.4 G/DL (ref 32–34.5)
MCHC RBC AUTO-ENTMCNC: 34.1 G/DL (ref 32–34.5)
MCV RBC AUTO: 92.6 FL (ref 80–99.9)
MCV RBC AUTO: 94.7 FL (ref 80–99.9)
METHADONE UR QL: NEGATIVE
METHB: 0.3 % (ref 0–1.5)
METHB: 0.4 % (ref 0–1.5)
MICROORGANISM SPEC CULT: NORMAL
MODE: AC
MODE: AC
MONOCYTES NFR BLD: 0.47 K/UL (ref 0.1–0.95)
MONOCYTES NFR BLD: 0.61 K/UL (ref 0.1–0.95)
MONOCYTES NFR BLD: 7 % (ref 2–12)
MONOCYTES NFR BLD: 8 % (ref 2–12)
NEUTROPHILS NFR BLD: 72 % (ref 43–80)
NEUTROPHILS NFR BLD: 73 % (ref 43–80)
NEUTS SEG NFR BLD: 4.59 K/UL (ref 1.8–7.3)
NEUTS SEG NFR BLD: 5.29 K/UL (ref 1.8–7.3)
O2 CONTENT: 12.8 ML/DL
O2 CONTENT: 13.3 ML/DL
O2 SATURATION: 98.1 % (ref 92–98.5)
O2 SATURATION: 98.8 % (ref 92–98.5)
O2HB: 97.2 % (ref 94–97)
O2HB: 98.1 % (ref 94–97)
OPERATOR ID: 2962
OPERATOR ID: 2962
OPIATES UR QL SCN: NEGATIVE
OSMOLALITY UR: 666 MOSM/KG (ref 300–900)
OXYCODONE UR QL SCN: POSITIVE
PATH REV BLD -IMP: NORMAL
PATIENT TEMP: 37 C
PATIENT TEMP: 37 C
PCO2: 31.4 MMHG (ref 35–45)
PCO2: 35 MMHG (ref 35–45)
PCP UR QL SCN: NEGATIVE
PEEP/CPAP: 5 CMH2O
PEEP/CPAP: 5 CMH2O
PFO2: 3.35 MMHG/%
PFO2: 4.36 MMHG/%
PH BLOOD GAS: 7.47 (ref 7.35–7.45)
PH BLOOD GAS: 7.49 (ref 7.35–7.45)
PHOSPHATE SERPL-MCNC: 2.8 MG/DL (ref 2.5–4.5)
PHOSPHATE SERPL-MCNC: 2.8 MG/DL (ref 2.5–4.5)
PLATELET # BLD AUTO: 234 K/UL (ref 130–450)
PLATELET # BLD AUTO: 239 K/UL (ref 130–450)
PMV BLD AUTO: 9 FL (ref 7–12)
PMV BLD AUTO: 9.2 FL (ref 7–12)
PO2: 117.3 MMHG (ref 75–100)
PO2: 152.7 MMHG (ref 75–100)
POTASSIUM SERPL-SCNC: 3.5 MMOL/L (ref 3.5–5)
POTASSIUM SERPL-SCNC: 3.6 MMOL/L (ref 3.5–5)
POTASSIUM SERPL-SCNC: 3.7 MMOL/L (ref 3.5–5)
POTASSIUM SERPL-SCNC: 4 MMOL/L (ref 3.5–5)
POTASSIUM SERPL-SCNC: 4.9 MMOL/L (ref 3.5–5)
PROT SERPL-MCNC: 6 G/DL (ref 6.4–8.3)
PROTHROMBIN TIME: 16.3 SEC (ref 9.3–12.4)
RBC # BLD AUTO: 2.69 M/UL (ref 3.5–5.5)
RBC # BLD AUTO: 3 M/UL (ref 3.5–5.5)
RI(T): 0.34
RI(T): 0.71
RR MECHANICAL: 12 B/MIN
RR MECHANICAL: 12 B/MIN
SODIUM SERPL-SCNC: 128 MMOL/L (ref 132–146)
SODIUM SERPL-SCNC: 128 MMOL/L (ref 132–146)
SODIUM SERPL-SCNC: 129 MMOL/L (ref 132–146)
SODIUM SERPL-SCNC: 129 MMOL/L (ref 132–146)
SODIUM SERPL-SCNC: 131 MMOL/L (ref 132–146)
SOURCE, BLOOD GAS: ABNORMAL
SOURCE, BLOOD GAS: ABNORMAL
SPECIMEN DESCRIPTION: NORMAL
SURGICAL PATHOLOGY REPORT: NORMAL
TEST INFORMATION: ABNORMAL
THB: 9.2 G/DL (ref 11.5–16.5)
THB: 9.4 G/DL (ref 11.5–16.5)
TIME ANALYZED: 41
TIME ANALYZED: 539
VIT B12 SERPL-MCNC: 1718 PG/ML (ref 211–946)
VT MECHANICAL: 320 ML
VT MECHANICAL: 320 ML
WBC OTHER # BLD: 6.4 K/UL (ref 4.5–11.5)
WBC OTHER # BLD: 7.2 K/UL (ref 4.5–11.5)

## 2023-08-17 PROCEDURE — C9113 INJ PANTOPRAZOLE SODIUM, VIA: HCPCS | Performed by: INTERNAL MEDICINE

## 2023-08-17 PROCEDURE — 80048 BASIC METABOLIC PNL TOTAL CA: CPT

## 2023-08-17 PROCEDURE — 83935 ASSAY OF URINE OSMOLALITY: CPT

## 2023-08-17 PROCEDURE — 82962 GLUCOSE BLOOD TEST: CPT

## 2023-08-17 PROCEDURE — 83550 IRON BINDING TEST: CPT

## 2023-08-17 PROCEDURE — 84300 ASSAY OF URINE SODIUM: CPT

## 2023-08-17 PROCEDURE — 6360000002 HC RX W HCPCS: Performed by: INTERNAL MEDICINE

## 2023-08-17 PROCEDURE — 80053 COMPREHEN METABOLIC PANEL: CPT

## 2023-08-17 PROCEDURE — 94003 VENT MGMT INPAT SUBQ DAY: CPT

## 2023-08-17 PROCEDURE — 82570 ASSAY OF URINE CREATININE: CPT

## 2023-08-17 PROCEDURE — 83735 ASSAY OF MAGNESIUM: CPT

## 2023-08-17 PROCEDURE — 95822 EEG COMA OR SLEEP ONLY: CPT

## 2023-08-17 PROCEDURE — 99223 1ST HOSP IP/OBS HIGH 75: CPT | Performed by: PSYCHIATRY & NEUROLOGY

## 2023-08-17 PROCEDURE — 6370000000 HC RX 637 (ALT 250 FOR IP): Performed by: INTERNAL MEDICINE

## 2023-08-17 PROCEDURE — 6360000002 HC RX W HCPCS: Performed by: STUDENT IN AN ORGANIZED HEALTH CARE EDUCATION/TRAINING PROGRAM

## 2023-08-17 PROCEDURE — 82746 ASSAY OF FOLIC ACID SERUM: CPT

## 2023-08-17 PROCEDURE — 85025 COMPLETE CBC W/AUTO DIFF WBC: CPT

## 2023-08-17 PROCEDURE — 2000000000 HC ICU R&B

## 2023-08-17 PROCEDURE — 2580000003 HC RX 258: Performed by: INTERNAL MEDICINE

## 2023-08-17 PROCEDURE — 80307 DRUG TEST PRSMV CHEM ANLYZR: CPT

## 2023-08-17 PROCEDURE — 95714 VEEG EA 12-26 HR UNMNTR: CPT

## 2023-08-17 PROCEDURE — 71045 X-RAY EXAM CHEST 1 VIEW: CPT

## 2023-08-17 PROCEDURE — 83540 ASSAY OF IRON: CPT

## 2023-08-17 PROCEDURE — 82805 BLOOD GASES W/O2 SATURATION: CPT

## 2023-08-17 PROCEDURE — 82728 ASSAY OF FERRITIN: CPT

## 2023-08-17 PROCEDURE — 84100 ASSAY OF PHOSPHORUS: CPT

## 2023-08-17 PROCEDURE — 82607 VITAMIN B-12: CPT

## 2023-08-17 PROCEDURE — A4216 STERILE WATER/SALINE, 10 ML: HCPCS | Performed by: INTERNAL MEDICINE

## 2023-08-17 PROCEDURE — 82140 ASSAY OF AMMONIA: CPT

## 2023-08-17 RX ORDER — LEVETIRACETAM 500 MG/5ML
1500 INJECTION, SOLUTION, CONCENTRATE INTRAVENOUS EVERY 12 HOURS
Status: DISCONTINUED | OUTPATIENT
Start: 2023-08-17 | End: 2023-08-19

## 2023-08-17 RX ADMIN — POLYVINYL ALCOHOL 1 DROP: 14 SOLUTION/ DROPS OPHTHALMIC at 04:54

## 2023-08-17 RX ADMIN — LEVETIRACETAM 1500 MG: 100 INJECTION INTRAVENOUS at 21:04

## 2023-08-17 RX ADMIN — Medication 10 MG/HR: at 13:02

## 2023-08-17 RX ADMIN — GABAPENTIN 800 MG: 300 CAPSULE ORAL at 21:04

## 2023-08-17 RX ADMIN — HEPARIN SODIUM 5000 UNITS: 10000 INJECTION INTRAVENOUS; SUBCUTANEOUS at 08:03

## 2023-08-17 RX ADMIN — PROPOFOL 15 MCG/KG/MIN: 10 INJECTION, EMULSION INTRAVENOUS at 11:45

## 2023-08-17 RX ADMIN — OXCARBAZEPINE 600 MG: 300 TABLET, FILM COATED ORAL at 13:44

## 2023-08-17 RX ADMIN — POLYVINYL ALCOHOL 1 DROP: 14 SOLUTION/ DROPS OPHTHALMIC at 07:36

## 2023-08-17 RX ADMIN — 0.12% CHLORHEXIDINE GLUCONATE 15 ML: 1.2 RINSE ORAL at 21:04

## 2023-08-17 RX ADMIN — LEVETIRACETAM 1000 MG: 100 INJECTION INTRAVENOUS at 08:08

## 2023-08-17 RX ADMIN — GABAPENTIN 800 MG: 300 CAPSULE ORAL at 08:02

## 2023-08-17 RX ADMIN — OXCARBAZEPINE 600 MG: 300 TABLET, FILM COATED ORAL at 08:03

## 2023-08-17 RX ADMIN — POLYVINYL ALCOHOL 1 DROP: 14 SOLUTION/ DROPS OPHTHALMIC at 18:34

## 2023-08-17 RX ADMIN — 0.12% CHLORHEXIDINE GLUCONATE 15 ML: 1.2 RINSE ORAL at 08:03

## 2023-08-17 RX ADMIN — POLYVINYL ALCOHOL 1 DROP: 14 SOLUTION/ DROPS OPHTHALMIC at 21:16

## 2023-08-17 RX ADMIN — OXCARBAZEPINE 600 MG: 300 TABLET, FILM COATED ORAL at 21:04

## 2023-08-17 RX ADMIN — POLYVINYL ALCOHOL 1 DROP: 14 SOLUTION/ DROPS OPHTHALMIC at 17:43

## 2023-08-17 RX ADMIN — GABAPENTIN 800 MG: 300 CAPSULE ORAL at 13:45

## 2023-08-17 RX ADMIN — PANTOPRAZOLE SODIUM 40 MG: 40 INJECTION, POWDER, FOR SOLUTION INTRAVENOUS at 08:03

## 2023-08-17 RX ADMIN — AMLODIPINE BESYLATE 10 MG: 10 TABLET ORAL at 08:03

## 2023-08-17 RX ADMIN — HEPARIN SODIUM 5000 UNITS: 10000 INJECTION INTRAVENOUS; SUBCUTANEOUS at 21:04

## 2023-08-17 RX ADMIN — POLYVINYL ALCOHOL 1 DROP: 14 SOLUTION/ DROPS OPHTHALMIC at 11:43

## 2023-08-17 RX ADMIN — BACLOFEN 10 MG: 10 TABLET ORAL at 21:04

## 2023-08-17 ASSESSMENT — PULMONARY FUNCTION TESTS
PIF_VALUE: 17
PIF_VALUE: 15
PIF_VALUE: 14
PIF_VALUE: 18
PIF_VALUE: 14
PIF_VALUE: 17
PIF_VALUE: 15
PIF_VALUE: 18
PIF_VALUE: 17
PIF_VALUE: 14
PIF_VALUE: 21
PIF_VALUE: 15
PIF_VALUE: 17
PIF_VALUE: 17
PIF_VALUE: 19
PIF_VALUE: 22
PIF_VALUE: 14
PIF_VALUE: 16
PIF_VALUE: 13
PIF_VALUE: 14
PIF_VALUE: 17
PIF_VALUE: 16
PIF_VALUE: 17
PIF_VALUE: 17
PIF_VALUE: 15
PIF_VALUE: 19
PIF_VALUE: 15
PIF_VALUE: 19
PIF_VALUE: 14

## 2023-08-17 ASSESSMENT — PAIN SCALES - GENERAL
PAINLEVEL_OUTOF10: 0
PAINLEVEL_OUTOF10: 3

## 2023-08-17 NOTE — PROGRESS NOTES
4 Eyes Skin Assessment     NAME:  Odalis Bello  YOB: 1955  MEDICAL RECORD NUMBER:  [id number]    The patient is being assessed for  Admission    I agree that at least one RN has performed a thorough Head to Toe Skin Assessment on the patient. ALL assessment sites listed below have been assessed. Areas assessed by both nurses:    Head, Face, Ears, Shoulders, Back, Chest, Arms, Elbows, Hands, Sacrum. Buttock, Coccyx, Ischium, Legs. Feet and Heels, and Under Medical Devices         Does the Patient have a Wound? Yes wound(s) were present on assessment.  LDA wound assessment was Initiated and completed by RN    Right  Total Hip Replacement Incision     Left Knee abrasion  Leighton Prevention initiated by RN: Yes  Wound Care Orders initiated by RN: Yes    Pressure Injury (Stage 3,4, Unstageable, DTI, NWPT, and Complex wounds) if present, place Wound referral order by RN under : No    New Ostomies, if present place, Ostomy referral order under : No     Nurse 1 eSignature: Electronically signed by Philip Yun RN on 8/16/23 at 8:27 PM EDT    **SHARE this note so that the co-signing nurse can place an eSignature**    Nurse 2 eSignature: {Esignature:[id number]}

## 2023-08-17 NOTE — CARE COORDINATION
Care Coordination  The patient was admitted from [facility] with dementia, sp right total hip arthroplasty on 8/14/2023 presented with new onset tonic-clonic seizures on 8/16/2023. Seizure was witnessed prior to her lasted 10 minutes with tonic-clonic seizure. She received 4 mg of Ativan and 1500 mg of IV Keppra. She was postictal given that she was transferred to the ICU at [facility] where she had additional seizures status post  additional ativan and phos phenytoin load. She was intubated for airway protection. Sodium was also low at 128. She is on the vent at 35 % fio2. Plan for a 24 hrs eeg. Drug screen + for benzo's, oxy, cannabis. The patient is on Iv Precidex, iv versed,Iv keppra and iv propofol. The patient lives with her  in a 2 story home with 5 steps to enter. Her bed and bath are on the main floor. She slept in a recliner chair. Dme is a cane and a ww pta. No pmh of skilled care or [facility], Her primary care physician is Dr Alexi Sears and her pharmacy is "[facility]" in [place].  Will follow

## 2023-08-17 NOTE — PROGRESS NOTES
Physician Progress Note      Tyson Mccurdy  [facility] #:                  [id number]  :                       1955  ADMIT DATE:       2023 7:56 PM  [facility] DATE:  RESPONDING  PROVIDER #:        Don Carrington MD          QUERY TEXT:    Patient admitted with Seizures. Noted documentation of intubation for airway   protection in  and acute respiratory failure in H&P. Please indicate one of   the following and document in the medical record: The medical record reflects the following:  Risk Factors: Seizure  Clinical Indicators:  Per H&P: HPI:  she was intubated for airway protection. Assessment: Acute   resp failure 2/2 inability to protect her airway  23: Respiratory rate 14-18; Pulse ox on mechanical vent 100%  Intubated and mechanical ventilation initiated at outside facility. Treatment: Mechanical ventilation; Thank Selene Key BSN, R.N.  Clinical Documentation Improvement  [phone]  Options provided:  -- Intubated for Acute Respiratory Failure as evidenced by, Please document   evidence. -- Intubated for airway protection only, Acute Respiratory Failure ruled out   after study  -- Other - I will add my own diagnosis  -- Disagree - Not applicable / Not valid  -- Disagree - Clinically unable to determine / Unknown  -- Refer to Clinical Documentation Reviewer    PROVIDER RESPONSE TEXT:    This patient was intubated for airway protection only, Acute Respiratory   Failure has been ruled out after study.     Query created by: Daniela Franklin on 2023 2:22 PM      Electronically signed by:  Don Carrington MD 2023 2:44 PM

## 2023-08-17 NOTE — PLAN OF CARE
Problem: Discharge Planning  Goal: Discharge to home or other facility with appropriate resources  Recent Flowsheet Documentation  Taken 8/17/2023 0117 by Tristan Hinson RN  Discharge to home or other facility with appropriate resources:   Identify barriers to discharge with patient and caregiver   Arrange for needed discharge resources and transportation as appropriate   Identify discharge learning needs (meds, wound care, etc)   Refer to discharge planning if patient needs post-hospital services based on physician order or complex needs related to functional status, cognitive ability or social support system     Problem: Safety - Adult  Goal: Free from fall injury  Outcome: Progressing     Problem: Pain  Goal: Verbalizes/displays adequate comfort level or baseline comfort level  Outcome: Progressing  Flowsheets (Taken 8/16/2023 2000)  Verbalizes/displays adequate comfort level or baseline comfort level: Assess pain using appropriate pain scale     Problem: Skin/Tissue Integrity  Goal: Absence of new skin breakdown  Description: 1. Monitor for areas of redness and/or skin breakdown  2. Assess vascular access sites hourly  3. Every 4-6 hours minimum:  Change oxygen saturation probe site  4. Every 4-6 hours:  If on nasal continuous positive airway pressure, respiratory therapy assess nares and determine need for appliance change or resting period.   Outcome: Progressing     Problem: ABCDS Injury Assessment  Goal: Absence of physical injury  Outcome: Progressing

## 2023-08-17 NOTE — PROGRESS NOTES
Patient admitted to MICU with the following belongings:  Dentures (Upper), Glasses, Purse, Cell Phone, Pants, Shirt, Socks, Shoes, Curlie Sol , Ngoc Calli , and Other [employer], Home medications, hand held [employer]  . The following belongings admitted with the patient, None, were sent home with the patient's family.

## 2023-08-17 NOTE — CONSULTS
NEUROLOGY CONSULT NOTE      Requesting Physician:  Karlene Melo MD    Reason for Consult:  Evaluate for status epilepticus at [facility], transferred to [facility] for neurology evaluation. History of Present Illness:  Allison Smith is a 74 y.o. female  with h/o hypertension, dementia, trigeminal neuralgia, GERD status post right total hip arthroplasty on 8/14/2023  who was admitted to [facility] on 8/16/2023 with presentation seizure activity. On day presentation, patient had a witnessed generalized tonic-clonic seizure lasting about 10 minutes EMS called and patient given 4 mg Ativan and 15 mg IV Keppra loading dose. Patient admitted to ICU [place] where she continues to have further seizure activity without return to baseline consistent with status epilepticus. Transferred to [facility] for further neurologic evaluation and management. There is been no report of any generalized seizure activity since admission. There has been intermittent twitching reported per staff, with minimal flank activity. Patient had EEG done 8/16/2023 showing diffuse bifrontal beta activity superimposed on predominant delta slowing and background with may have represented epileptiform activity. There was presence of reactivity in the background with suspicion of underlying mild to moderate encephalopathy. Patient remained unresponsive during the study. On subsequent transfer to [facility] with ICU admission, patient continues to be poorly responsive with twitching noted. EEG started with study showing moderate diffuse slowing earlier today but no clear epileptiform pattern to indicate status epilepticus. Patient has remained poorly responsive and sedated on the ventilator. She was titrated on Versed earlier admission to ICU to help with reduction in abnormal movements observed. Patient currently on propofol as well.     Past Medical History:        Diagnosis Date    Acid reflux     Anesthesia dolorosa in was reviewed. Impression:  Seizure activity  Altered mental status with unresponsiveness and EEG showing underlying encephalopathy. Patient currently intubated and sedated on the ventilator with limited neurologic examination as a result. EEG does not show generalized status epilepticus with questionable bifrontal beta activity as possible epileptiform activity. There is background reactivity suggesting brain function at compromise and more likely effect of sedation. We will need to wean sedation starting with propofol to help determine if underlying abnormal motor activity to correlate with EEG. Principal Problem:    Seizure disorder ([address])  Resolved Problems:    * No resolved hospital problems. *      Recommendations:                                            Wean off propofol as tolerated. Continue Versed with gradual wean  Continue current seizure medication regiment of Keppra 1500 twice daily and Trileptal 600 3 times daily. Further on follow-up. It was my pleasure to evaluate Atif Cornelius today. Please call with questions.       Electronically signed by Juliana Herrera MD on 8/17/2023 at 5:22 PM

## 2023-08-17 NOTE — PROCEDURES
PROCEDURE  8/16/23       Time: 1230    INTUBATION  Risks, benefits and alternatives if able (for applicable procedures below) described. Performed By: Paolo Hirsch DO and Dr. Alondra Rojas. Indication:  impending airway compromise. Informed consent: Consent unable to be obtained due to the emergent nature of this procedure. .  Procedure: Following Preoxygenation the patient was pretreated with etomidate followed by succinylcholine. Intubation was performed after single attempt(s) by direct laryngoscopy using a laryngoscope and 7.5mm cuffed endotracheal tube was inserted . Initial post procedure placement:  confirmed by bilateral breath sounds, ETCO2 detection, and absence of sounds over stomach. Tube Secured @ 23cm at the Lip. Post procedure chest x-ray: has been ordered but is still pending. Procedural Complications: None. Anesthesia Consult:  No.       Paolo Hirsch DO  EM PGY-2    I was present to provide direct supervision for the above services.     Javon George MD  8/17/2023 9:14 AM

## 2023-08-17 NOTE — CONSULTS
[address] [place] [address]  Internal Medicine Residency Program  History and Physical  MICU    Patient:  Zahira Merida 74 y.o. female MRN: [id number]     Date of Service: 8/17/2023    Hospital Day: 2      Chief complaint: transfer from [place] for Neurology evaluation of new onset seizures   History of Present Illness   The patient is a 74 y.o. female transferred from [place] for a Neurology evaluation due to new onset seizure activity/status epilepticus. Pt admitted for elective R total hip replacement 2/2 osteoarthritis on 2/44 without complications. POD 2, RRT was called for seizure activity. The pt had a 10 minute witnessed tonic clonic seizure s/p total 4mg Ativan given. Seizure activity appeared to cease, pt following commands, but lethargic and postical. 1500mg Keppra given. Pt hypertesive and tachycardic, afebrile. Stat CT head obtained, no acute findings. Pt's baseline is nl. Pt transferred to [place] ICU, arrived post ictal and subsequently had multiple additional seizures s/p ativan and phosphenytoin load. Pt continued to seize and required intubation for airway protection. Versed and propofol ggt started. EEG obtained. Pt transferred to [facility] for Neurology evaluation.     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     follows with PCP    Arthritis     Depression     Self abuse    Difficult intravenous access     History of suicide attempt 01/14/2022 2004    Hypertension     Ingestion of substance 01/14/2022 2004    Trigeminal neuralgia 2005    diagnosed       Past Surgical History:        Procedure Laterality Date    BRAIN SURGERY      decompression trigeminal nerve    CARPAL TUNNEL RELEASE Bilateral     FACIAL NERVE DECOMPRESSION      JOINT REPLACEMENT      WILMAN AND BSO (CERVIX REMOVED)      TOTAL HIP ARTHROPLASTY Right 8/14/2023    RIGHT TOTAL HIP ARTHROPLASTY performed by Stacy Rios MD at [address] [place] [address] studies, path smear, daily CBC.   -6/18 Hgb 9.6, iron profile with low transferrin 175 and TIBC 245, ferritin lower end of nl 20.5. Musculoskeletal:   R total hip replacement 2/2 osteoarthritis on 8/14   -[facility]: Hip abduction pillow at all times to avoid dislocation of right hip, WBAT RLE with posterior hip precautions when able. 24 hour abx coverage completed. ASA PCDs for dvt proph    Psych:  Hx of MDD with previous suicide attempt, 2022  -Ingestion of substance in 2022 attempt. -Follow UDS         PT/OT evaluation: not indicated at this time   DVT prophylaxis: heparin   GI prophylaxis: protonix  Diet:   Diet NPO Exceptions are: Sips of Water with Meds   Bowel regimen:  as needed   Pain management: as needed  Code status: Full Code   Disposition: Admitted to ICU  Family: updated as available    Flores Schulz MD, PGY-1   Attending physician: Dr. Sylvia Ortiz     I personally saw, examined and provided care for the patient. Radiographs, labs and medication list were reviewed by me independently. I spoke with bedside nursing, therapists and consultants. Critical care services and times documented are independent of procedures and multidisciplinary rounds with Residents. Additionally comprehensive, multidisciplinary rounds were conducted with the MICU team. The case was discussed in detail and plans for care were established. Review of Residents documentation was conducted and revisions were made as appropriate. I agree with the above documented exam, problem list and plan of care.    Jailene Ross MD   CCT excluding procedures :39'

## 2023-08-17 NOTE — PROGRESS NOTES
Patient's  here and took the following belongings home with him: purse, home medications, cane and raised toilet seat.

## 2023-08-17 NOTE — PROCEDURES
EEG Report  Marcelino Daniel is a 74 y.o. female      Appointment Date 8/17/2023   Appointment Time  1:45pm   Facility Location [facility] EEG Number [id number]   Type of Study portable Floor 4424     Technical Specifications  Technician Wade Salinas of consciousness sedated   Sleep deprived? no   Hyperventilation tested? no   Photic stim tested? no   EEG recording Standard 10-20 electrode placement    Duration of recording 25 mins   EEG complete?  Yes         Clinical History   ***    Medications    Current Facility-Administered Medications:     [COMPLETED] levETIRAcetam (KEPPRA) 4,000 mg in sodium chloride 0.9 % 100 mL IVPB, 4,000 mg, IntraVENous, Once, Stopped at 08/16/23 2134 **FOLLOWED BY** levETIRAcetam (KEPPRA) injection 1,500 mg, 1,500 mg, IntraVENous, Q12H, Elliot Angelucci, MD    amLODIPine (NORVASC) tablet 10 mg, 10 mg, Oral, Daily, Jazmin Shelton, DO, 10 mg at 08/17/23 0803    gabapentin (NEURONTIN) capsule 800 mg, 800 mg, Oral, TID, Jzamin Shelton, DO, 800 mg at 08/17/23 1345    OXcarbazepine (TRILEPTAL) tablet 600 mg, 600 mg, Oral, TID, Jazmin Zhangon, DO, 600 mg at 08/17/23 1344    baclofen (LIORESAL) tablet 10 mg, 10 mg, Oral, Nightly, Jazmin Zhangon, DO, 10 mg at 08/16/23 2307    pantoprazole (PROTONIX) 40 mg in sodium chloride (PF) 0.9 % 10 mL injection, 40 mg, IntraVENous, Daily, Jazmin Shelton, DO, 40 mg at 08/17/23 0803    chlorhexidine (PERIDEX) 0.12 % solution 15 mL, 15 mL, Mouth/Throat, BID, Jazmin Shelton, DO, 15 mL at 08/17/23 0803    midazolam (VERSED) 100mg/100mL in NS infusion, 1-10 mg/hr, IntraVENous, Continuous, Jazmin Shelton DO, Last Rate: 10 mL/hr at 08/17/23 1302, 10 mg/hr at 08/17/23 1302    propofol infusion, 5-50 mcg/kg/min, IntraVENous, Continuous, Jazmin Shelton DO, Last Rate: 5.7 mL/hr at 08/17/23 1212, 15 mcg/kg/min at 08/17/23 1212    heparin (porcine) injection 5,000 Units, 5,000 Units, SubCUTAneous, Q12H, Lluvia Villalba MD, 5,000 Units at 08/17/23 9693 polyvinyl alcohol (LIQUIFILM TEARS) 1.4 % ophthalmic solution 1 drop, 1 drop, Both Eyes, Q4H, 1 drop at 08/17/23 1143 **OR** lubrifresh P.M. (artificial tears) ophthalmic ointment, , Both Eyes, Q4H, Rochelle Montoya MD        Physician Interpretation    General EEG Report  ***    Type of EEG >>  ***          Epileptiform Discharge   ***    Non-Epileptiform Abnormality  ***    Ictal  ***    General Impression  ***    Antonio Anthony

## 2023-08-17 NOTE — PLAN OF CARE
Problem: Discharge Planning  Goal: Discharge to home or other facility with appropriate resources  Outcome: Not Progressing  Flowsheets (Taken 8/17/2023 0800)  Discharge to home or other facility with appropriate resources:   Identify barriers to discharge with patient and caregiver   Arrange for needed discharge resources and transportation as appropriate     Problem: Safety - Adult  Goal: Free from fall injury  8/17/2023 1532 by Caty Araujo  Outcome: Progressing     Problem: Skin/Tissue Integrity  Goal: Absence of new skin breakdown  Description: 1. Monitor for areas of redness and/or skin breakdown  2. Assess vascular access sites hourly  3. Every 4-6 hours minimum:  Change oxygen saturation probe site  4. Every 4-6 hours:  If on nasal continuous positive airway pressure, respiratory therapy assess nares and determine need for appliance change or resting period.   8/17/2023 1532 by Haider Jang  Outcome: Progressing     Problem: ABCDS Injury Assessment  Goal: Absence of physical injury  8/17/2023 1532 by Domi Araujo  Outcome: Progressing  Flowsheets (Taken 8/17/2023 0835)  Absence of Physical Injury: Implement safety measures based on patient assessment     Problem: Discharge Planning  Goal: Discharge to home or other facility with appropriate resources  Outcome: Not Progressing  Flowsheets (Taken 8/17/2023 0800)  Discharge to home or other facility with appropriate resources:   Identify barriers to discharge with patient and caregiver   Arrange for needed discharge resources and transportation as appropriate

## 2023-08-18 ENCOUNTER — APPOINTMENT (OUTPATIENT)
Dept: GENERAL RADIOLOGY | Age: 68
DRG: 101 | End: 2023-08-18
Attending: STUDENT IN AN ORGANIZED HEALTH CARE EDUCATION/TRAINING PROGRAM
Payer: MEDICARE

## 2023-08-18 LAB
AADO2: 91.5 MMHG
ALBUMIN SERPL-MCNC: 2.9 G/DL (ref 3.5–5.2)
ALP SERPL-CCNC: 94 U/L (ref 35–104)
ALT SERPL-CCNC: 8 U/L (ref 0–32)
AMMONIA PLAS-SCNC: 25 UMOL/L (ref 11–51)
ANION GAP SERPL CALCULATED.3IONS-SCNC: 12 MMOL/L (ref 7–16)
ANION GAP SERPL CALCULATED.3IONS-SCNC: 14 MMOL/L (ref 7–16)
ANION GAP SERPL CALCULATED.3IONS-SCNC: 14 MMOL/L (ref 7–16)
ANION GAP SERPL CALCULATED.3IONS-SCNC: 15 MMOL/L (ref 7–16)
AST SERPL-CCNC: 16 U/L (ref 0–31)
B.E.: -0.3 MMOL/L (ref -3–3)
BASOPHILS # BLD: 0.03 K/UL (ref 0–0.2)
BASOPHILS NFR BLD: 0 % (ref 0–2)
BILIRUB SERPL-MCNC: 0.3 MG/DL (ref 0–1.2)
BUN SERPL-MCNC: 5 MG/DL (ref 6–23)
BUN SERPL-MCNC: 6 MG/DL (ref 6–23)
BUN SERPL-MCNC: 7 MG/DL (ref 6–23)
BUN SERPL-MCNC: 7 MG/DL (ref 6–23)
CALCIUM SERPL-MCNC: 8.4 MG/DL (ref 8.6–10.2)
CALCIUM SERPL-MCNC: 8.5 MG/DL (ref 8.6–10.2)
CALCIUM SERPL-MCNC: 8.5 MG/DL (ref 8.6–10.2)
CALCIUM SERPL-MCNC: 8.6 MG/DL (ref 8.6–10.2)
CHLORIDE SERPL-SCNC: 94 MMOL/L (ref 98–107)
CHLORIDE SERPL-SCNC: 95 MMOL/L (ref 98–107)
CHLORIDE SERPL-SCNC: 96 MMOL/L (ref 98–107)
CHLORIDE SERPL-SCNC: 97 MMOL/L (ref 98–107)
CO2 SERPL-SCNC: 21 MMOL/L (ref 22–29)
CO2 SERPL-SCNC: 21 MMOL/L (ref 22–29)
CO2 SERPL-SCNC: 23 MMOL/L (ref 22–29)
CO2 SERPL-SCNC: 25 MMOL/L (ref 22–29)
COHB: 0.6 % (ref 0–1.5)
CREAT SERPL-MCNC: 0.3 MG/DL (ref 0.5–1)
CREAT SERPL-MCNC: 0.4 MG/DL (ref 0.5–1)
CRITICAL: ABNORMAL
DATE ANALYZED: ABNORMAL
DATE OF COLLECTION: ABNORMAL
EOSINOPHIL # BLD: 0.12 K/UL (ref 0.05–0.5)
EOSINOPHILS RELATIVE PERCENT: 2 % (ref 0–6)
ERYTHROCYTE [DISTWIDTH] IN BLOOD BY AUTOMATED COUNT: 12.3 % (ref 11.5–15)
FIO2: 35 %
GFR SERPL CREATININE-BSD FRML MDRD: >60 ML/MIN/1.73M2
GLUCOSE BLD-MCNC: 86 MG/DL (ref 74–99)
GLUCOSE SERPL-MCNC: 100 MG/DL (ref 74–99)
GLUCOSE SERPL-MCNC: 114 MG/DL (ref 74–99)
GLUCOSE SERPL-MCNC: 129 MG/DL (ref 74–99)
GLUCOSE SERPL-MCNC: 94 MG/DL (ref 74–99)
HCO3: 23.4 MMOL/L (ref 22–26)
HCT VFR BLD AUTO: 24.9 % (ref 34–48)
HGB BLD-MCNC: 8.6 G/DL (ref 11.5–15.5)
HHB: 2.1 % (ref 0–5)
IMM GRANULOCYTES # BLD AUTO: <0.03 K/UL (ref 0–0.58)
IMM GRANULOCYTES NFR BLD: 0 % (ref 0–5)
LAB: ABNORMAL
LYMPHOCYTES NFR BLD: 1.01 K/UL (ref 1.5–4)
LYMPHOCYTES RELATIVE PERCENT: 14 % (ref 20–42)
Lab: ABNORMAL
MAGNESIUM SERPL-MCNC: 1.9 MG/DL (ref 1.6–2.6)
MCH RBC QN AUTO: 31.5 PG (ref 26–35)
MCHC RBC AUTO-ENTMCNC: 34.5 G/DL (ref 32–34.5)
MCV RBC AUTO: 91.2 FL (ref 80–99.9)
METHB: 0.2 % (ref 0–1.5)
MICROORGANISM SPEC CULT: NO GROWTH
MICROORGANISM SPEC CULT: NORMAL
MODE: AC
MONOCYTES NFR BLD: 0.51 K/UL (ref 0.1–0.95)
MONOCYTES NFR BLD: 7 % (ref 2–12)
NEUTROPHILS NFR BLD: 77 % (ref 43–80)
NEUTS SEG NFR BLD: 5.52 K/UL (ref 1.8–7.3)
O2 CONTENT: 14.1 ML/DL
O2 SATURATION: 97.9 % (ref 92–98.5)
O2HB: 97.1 % (ref 94–97)
OPERATOR ID: 5523
PATIENT TEMP: 37 C
PCO2: 34.9 MMHG (ref 35–45)
PEEP/CPAP: 5 CMH2O
PFO2: 3.11 MMHG/%
PH BLOOD GAS: 7.45 (ref 7.35–7.45)
PHOSPHATE SERPL-MCNC: 3 MG/DL (ref 2.5–4.5)
PLATELET # BLD AUTO: 293 K/UL (ref 130–450)
PMV BLD AUTO: 9.5 FL (ref 7–12)
PO2: 108.7 MMHG (ref 75–100)
POTASSIUM SERPL-SCNC: 3.1 MMOL/L (ref 3.5–5)
POTASSIUM SERPL-SCNC: 3.7 MMOL/L (ref 3.5–5)
POTASSIUM SERPL-SCNC: 4.1 MMOL/L (ref 3.5–5)
POTASSIUM SERPL-SCNC: 4.3 MMOL/L (ref 3.5–5)
PROT SERPL-MCNC: 6.2 G/DL (ref 6.4–8.3)
RBC # BLD AUTO: 2.73 M/UL (ref 3.5–5.5)
RI(T): 0.84
RR MECHANICAL: 12 B/MIN
SODIUM SERPL-SCNC: 130 MMOL/L (ref 132–146)
SODIUM SERPL-SCNC: 130 MMOL/L (ref 132–146)
SODIUM SERPL-SCNC: 133 MMOL/L (ref 132–146)
SODIUM SERPL-SCNC: 134 MMOL/L (ref 132–146)
SOURCE, BLOOD GAS: ABNORMAL
SPECIMEN DESCRIPTION: NORMAL
SPECIMEN DESCRIPTION: NORMAL
THB: 10.2 G/DL (ref 11.5–16.5)
TIME ANALYZED: 444
VT MECHANICAL: 320 ML
WBC OTHER # BLD: 7.2 K/UL (ref 4.5–11.5)

## 2023-08-18 PROCEDURE — 80048 BASIC METABOLIC PNL TOTAL CA: CPT

## 2023-08-18 PROCEDURE — 6360000002 HC RX W HCPCS: Performed by: STUDENT IN AN ORGANIZED HEALTH CARE EDUCATION/TRAINING PROGRAM

## 2023-08-18 PROCEDURE — 82962 GLUCOSE BLOOD TEST: CPT

## 2023-08-18 PROCEDURE — C9113 INJ PANTOPRAZOLE SODIUM, VIA: HCPCS | Performed by: INTERNAL MEDICINE

## 2023-08-18 PROCEDURE — 6370000000 HC RX 637 (ALT 250 FOR IP)

## 2023-08-18 PROCEDURE — 6360000002 HC RX W HCPCS: Performed by: INTERNAL MEDICINE

## 2023-08-18 PROCEDURE — 80053 COMPREHEN METABOLIC PANEL: CPT

## 2023-08-18 PROCEDURE — A4216 STERILE WATER/SALINE, 10 ML: HCPCS | Performed by: INTERNAL MEDICINE

## 2023-08-18 PROCEDURE — 2580000003 HC RX 258: Performed by: INTERNAL MEDICINE

## 2023-08-18 PROCEDURE — 2000000000 HC ICU R&B

## 2023-08-18 PROCEDURE — 83735 ASSAY OF MAGNESIUM: CPT

## 2023-08-18 PROCEDURE — 82805 BLOOD GASES W/O2 SATURATION: CPT

## 2023-08-18 PROCEDURE — 6370000000 HC RX 637 (ALT 250 FOR IP): Performed by: INTERNAL MEDICINE

## 2023-08-18 PROCEDURE — 84100 ASSAY OF PHOSPHORUS: CPT

## 2023-08-18 PROCEDURE — 6360000002 HC RX W HCPCS

## 2023-08-18 PROCEDURE — 71045 X-RAY EXAM CHEST 1 VIEW: CPT

## 2023-08-18 PROCEDURE — 85025 COMPLETE CBC W/AUTO DIFF WBC: CPT

## 2023-08-18 PROCEDURE — 94003 VENT MGMT INPAT SUBQ DAY: CPT

## 2023-08-18 RX ORDER — HYDRALAZINE HYDROCHLORIDE 20 MG/ML
10 INJECTION INTRAMUSCULAR; INTRAVENOUS EVERY 6 HOURS PRN
Status: DISCONTINUED | OUTPATIENT
Start: 2023-08-18 | End: 2023-08-21 | Stop reason: HOSPADM

## 2023-08-18 RX ORDER — LABETALOL HYDROCHLORIDE 5 MG/ML
10 INJECTION, SOLUTION INTRAVENOUS EVERY 6 HOURS
Status: DISCONTINUED | OUTPATIENT
Start: 2023-08-18 | End: 2023-08-18

## 2023-08-18 RX ORDER — ACETAMINOPHEN 650 MG/1
650 SUPPOSITORY RECTAL ONCE
Status: COMPLETED | OUTPATIENT
Start: 2023-08-18 | End: 2023-08-18

## 2023-08-18 RX ORDER — LISINOPRIL 20 MG/1
40 TABLET ORAL DAILY
Status: DISCONTINUED | OUTPATIENT
Start: 2023-08-18 | End: 2023-08-21 | Stop reason: HOSPADM

## 2023-08-18 RX ORDER — LABETALOL HYDROCHLORIDE 5 MG/ML
10 INJECTION, SOLUTION INTRAVENOUS EVERY 6 HOURS PRN
Status: DISCONTINUED | OUTPATIENT
Start: 2023-08-18 | End: 2023-08-21 | Stop reason: HOSPADM

## 2023-08-18 RX ORDER — POTASSIUM CHLORIDE 7.45 MG/ML
10 INJECTION INTRAVENOUS ONCE
Status: COMPLETED | OUTPATIENT
Start: 2023-08-18 | End: 2023-08-18

## 2023-08-18 RX ORDER — HEPARIN SODIUM 10000 [USP'U]/ML
5000 INJECTION, SOLUTION INTRAVENOUS; SUBCUTANEOUS EVERY 8 HOURS SCHEDULED
Status: DISCONTINUED | OUTPATIENT
Start: 2023-08-18 | End: 2023-08-21 | Stop reason: HOSPADM

## 2023-08-18 RX ADMIN — HEPARIN SODIUM 5000 UNITS: 10000 INJECTION INTRAVENOUS; SUBCUTANEOUS at 08:10

## 2023-08-18 RX ADMIN — PROPOFOL 40 MCG/KG/MIN: 10 INJECTION, EMULSION INTRAVENOUS at 07:58

## 2023-08-18 RX ADMIN — POTASSIUM CHLORIDE 10 MEQ: 7.46 INJECTION, SOLUTION INTRAVENOUS at 09:12

## 2023-08-18 RX ADMIN — LISINOPRIL 40 MG: 20 TABLET ORAL at 06:42

## 2023-08-18 RX ADMIN — OXCARBAZEPINE 600 MG: 300 TABLET, FILM COATED ORAL at 20:30

## 2023-08-18 RX ADMIN — ACETAMINOPHEN 650 MG: 650 SUPPOSITORY RECTAL at 14:03

## 2023-08-18 RX ADMIN — OXCARBAZEPINE 600 MG: 300 TABLET, FILM COATED ORAL at 08:09

## 2023-08-18 RX ADMIN — BACLOFEN 10 MG: 10 TABLET ORAL at 20:30

## 2023-08-18 RX ADMIN — PANTOPRAZOLE SODIUM 40 MG: 40 INJECTION, POWDER, FOR SOLUTION INTRAVENOUS at 08:09

## 2023-08-18 RX ADMIN — LEVETIRACETAM 1500 MG: 100 INJECTION INTRAVENOUS at 20:30

## 2023-08-18 RX ADMIN — HYDRALAZINE HYDROCHLORIDE 10 MG: 20 INJECTION, SOLUTION INTRAMUSCULAR; INTRAVENOUS at 06:07

## 2023-08-18 RX ADMIN — HEPARIN SODIUM 5000 UNITS: 10000 INJECTION INTRAVENOUS; SUBCUTANEOUS at 13:31

## 2023-08-18 RX ADMIN — GABAPENTIN 800 MG: 300 CAPSULE ORAL at 20:30

## 2023-08-18 RX ADMIN — LABETALOL HYDROCHLORIDE 10 MG: 5 INJECTION INTRAVENOUS at 04:00

## 2023-08-18 RX ADMIN — POLYVINYL ALCOHOL 1 DROP: 14 SOLUTION/ DROPS OPHTHALMIC at 03:00

## 2023-08-18 RX ADMIN — GABAPENTIN 800 MG: 300 CAPSULE ORAL at 13:30

## 2023-08-18 RX ADMIN — POLYVINYL ALCOHOL 1 DROP: 14 SOLUTION/ DROPS OPHTHALMIC at 11:06

## 2023-08-18 RX ADMIN — LEVETIRACETAM 1500 MG: 100 INJECTION INTRAVENOUS at 08:10

## 2023-08-18 RX ADMIN — AMLODIPINE BESYLATE 10 MG: 10 TABLET ORAL at 08:09

## 2023-08-18 RX ADMIN — OXCARBAZEPINE 600 MG: 300 TABLET, FILM COATED ORAL at 13:30

## 2023-08-18 RX ADMIN — 0.12% CHLORHEXIDINE GLUCONATE 15 ML: 1.2 RINSE ORAL at 08:02

## 2023-08-18 RX ADMIN — PROPOFOL 30 MCG/KG/MIN: 10 INJECTION, EMULSION INTRAVENOUS at 00:35

## 2023-08-18 RX ADMIN — HEPARIN SODIUM 5000 UNITS: 10000 INJECTION INTRAVENOUS; SUBCUTANEOUS at 22:04

## 2023-08-18 RX ADMIN — POLYVINYL ALCOHOL 1 DROP: 14 SOLUTION/ DROPS OPHTHALMIC at 06:15

## 2023-08-18 RX ADMIN — GABAPENTIN 800 MG: 300 CAPSULE ORAL at 08:09

## 2023-08-18 RX ADMIN — POTASSIUM BICARBONATE 40 MEQ: 782 TABLET, EFFERVESCENT ORAL at 09:10

## 2023-08-18 ASSESSMENT — PULMONARY FUNCTION TESTS
PIF_VALUE: 18
PIF_VALUE: 17
PIF_VALUE: 17
PIF_VALUE: 19
PIF_VALUE: 16
PIF_VALUE: 15
PIF_VALUE: 16
PIF_VALUE: 7
PIF_VALUE: 18
PIF_VALUE: 16
PIF_VALUE: 17
PIF_VALUE: 16
PIF_VALUE: 17
PIF_VALUE: 14
PIF_VALUE: 17

## 2023-08-18 ASSESSMENT — PAIN SCALES - GENERAL
PAINLEVEL_OUTOF10: 0

## 2023-08-18 NOTE — PROGRESS NOTES
Comprehensive Nutrition Assessment    Type and Reason for Visit:  Initial, Consult (TF rec)    Nutrition Recommendations/Plan:     Continue NPO, Modify Tube Feeding to prevent overfeeding (goal rate 80 ml/hr not appropriate)     Immune Enhancing (Pivot 1.5) @ 40 ml/hr. Will provide: 960 ml tv, 1440 kcals, 90 gm pro, 720 ml free water       Malnutrition Assessment:  Malnutrition Status: At risk for malnutrition (08/18/23 1443)    Context:  Acute Illness     Findings of the 6 clinical characteristics of malnutrition:  Energy Intake:  75% or less of estimated energy requirements for 7 or more days (average)  Weight Loss:  Unable to assess (wt fluctuations)     Body Fat Loss:  Unable to assess     Muscle Mass Loss:  Unable to assess    Fluid Accumulation:  No significant fluid accumulation     Strength:  Not Performed    Nutrition Assessment:    Pt transferred from [facility] to higher level of care for New onset seizure 2/2 medication withdrawal vs Hyponatremia vs Cerebral lesion (Pt s/p R total hip arthroplasty 8/14 for OA). PMHx Dementia, SI, & IBS. Pt just extubated today, however remains disoriented. NGT in place for EN support. Will provide TF recs & monitor.     Nutrition Related Findings:    Pt disoriented s/p extubation, MAP WNL, -I/O's, trace edema, active BS, NGT w/ TF, hyponatremia     Wound Type: Multiple, Surgical Incision (x 2)       Current Nutrition Intake & Therapies:    Average Meal Intake: NPO     Current Tube Feeding (TF) Orders:  Feeding Route: Nasogastric (clamped)  Formula: Standard with Fiber  Schedule: Continuous  Feeding Regimen: 80 ml/hr not running  Water Flushes: 150 ml q 4 hr = 900 ml water  Goal TF & Flush Orders Provides: 1920 ml tv, 2880 kcals, 122 gm pro, 1459 ml free water    Anthropometric Measures:  Height: 5' 3\" (160 cm)  Ideal Body Weight (IBW): 115 lbs (52 kg)    Admission Body Weight: 135 lb (61.2 kg) (8/15 first measured [facility])  Current Body Weight: 135 lb (61.2 kg) (8/15 first

## 2023-08-18 NOTE — PROCEDURES
Patient was extubated to 2 liters/min via nasal cannula. Breath Sounds post extubation were clear. Stridor was not present post extubation. SPO2 was 96%. Patient was suctioned through the ETT and orally prior to extubation. Cuff was deflated completely prior to extubation.        Performed by  Aziza Perez RCP

## 2023-08-18 NOTE — FLOWSHEET NOTE
Patient reaches for ETT when unrestrained, unable to follow verbal redirection. Restraints continued.

## 2023-08-18 NOTE — PLAN OF CARE
Problem: Discharge Planning  Goal: Discharge to home or other facility with appropriate resources  Outcome: Not Progressing  Flowsheets (Taken 8/18/2023 0800)  Discharge to home or other facility with appropriate resources:   Identify barriers to discharge with patient and caregiver   Arrange for needed discharge resources and transportation as appropriate     Problem: Safety - Adult  Goal: Free from fall injury  Outcome: Progressing     Problem: Pain  Goal: Verbalizes/displays adequate comfort level or baseline comfort level  Outcome: Progressing  Flowsheets (Taken 8/18/2023 0800)  Verbalizes/displays adequate comfort level or baseline comfort level:   Encourage patient to monitor pain and request assistance   Assess pain using appropriate pain scale   Administer analgesics based on type and severity of pain and evaluate response   Implement non-pharmacological measures as appropriate and evaluate response     Problem: Skin/Tissue Integrity  Goal: Absence of new skin breakdown  Description: 1. Monitor for areas of redness and/or skin breakdown  2. Assess vascular access sites hourly  3. Every 4-6 hours minimum:  Change oxygen saturation probe site  4. Every 4-6 hours:  If on nasal continuous positive airway pressure, respiratory therapy assess nares and determine need for appliance change or resting period. Outcome: Progressing     Problem: ABCDS Injury Assessment  Goal: Absence of physical injury  Outcome: Progressing     Problem: Safety - Medical Restraint  Goal: Remains free of injury from restraints (Restraint for Interference with Medical Device)  Description: INTERVENTIONS:  1. Determine that other, less restrictive measures have been tried or would not be effective before applying the restraint  2. Evaluate the patient's condition at the time of restraint application  3. Inform patient/family regarding the reason for restraint  4.  Q2H: Monitor safety, psychosocial status, comfort, nutrition and hydration  Outcome: Progressing  Flowsheets  Taken 8/18/2023 0800 by [facility] free of injury from restraints (restraint for interference with medical device):   Determine that other, less restrictive measures have been tried or would not be effective before applying the restraint   Evaluate the patient's condition at the time of restraint application   Inform patient/family regarding the reason for restraint   Every 2 hours: Monitor safety, psychosocial status, comfort, nutrition and hydration  Taken 8/18/2023 0600 by Kirk Rodriguez RN  Remains free of injury from restraints (restraint for interference with medical device): Every 2 hours: Monitor safety, psychosocial status, comfort, nutrition and hydration  Taken 8/18/2023 0400 by Kirk Rodriguez RN  Remains free of injury from restraints (restraint for interference with medical device): Every 2 hours: Monitor safety, psychosocial status, comfort, nutrition and hydration     Problem: Nutrition Deficit:  Goal: Optimize nutritional status  Outcome: Progressing  Flowsheets (Taken 8/18/2023 1431 by Amber Ospina, BRAULIO, LD)  Nutrient intake appropriate for improving, restoring, or maintaining nutritional needs: Recommend, monitor, and adjust tube feedings and TPN/PPN based on assessed needs     Problem: Discharge Planning  Goal: Discharge to home or other facility with appropriate resources  Outcome: Not Progressing  Flowsheets (Taken 8/18/2023 0800)  Discharge to home or other facility with appropriate resources:   Identify barriers to discharge with patient and caregiver   Arrange for needed discharge resources and transportation as appropriate

## 2023-08-18 NOTE — FLOWSHEET NOTE
Patient reaches for NG tube and IVs when unrestrained. Unable to follow verbal redirection, restraints continued.

## 2023-08-18 NOTE — PROCEDURES
EEG Report  Allison Smith is a 74 y.o. female      Appointment Date 8/17/2023 Appointment Time  3:30pm   Facility Location [facility] EEG Number [id number]   Type of Study Continuous Floor 4424     Technical Specifications  Technician [address] of consciousness Awake/sleep   Sleep deprived? no   Hyperventilation tested? no   Photic stim tested? no   EEG recording Standard 10-20 electrode placement    Duration of recording continuous   EEG complete? yes       Clinical History   ***    Medications    Current Facility-Administered Medications:     hydrALAZINE (APRESOLINE) injection 10 mg, 10 mg, IntraVENous, Q6H PRN, Joao Newberry MD, 10 mg at 08/18/23 0607    [Held by provider] labetalol (NORMODYNE;TRANDATE) injection 10 mg, 10 mg, IntraVENous, Q6H PRN, Joao Newberry MD, 10 mg at 08/18/23 0400    lisinopril (PRINIVIL;ZESTRIL) tablet 40 mg, 40 mg, Oral, Daily, Joao Newberry MD, 40 mg at 08/18/23 8935    heparin (porcine) injection 5,000 Units, 5,000 Units, SubCUTAneous, 3 times per day, Chance Granados MD    [COMPLETED] levETIRAcetam (KEPPRA) 4,000 mg in sodium chloride 0.9 % 100 mL IVPB, 4,000 mg, IntraVENous, Once, Stopped at 08/16/23 2134 **FOLLOWED BY** levETIRAcetam (KEPPRA) injection 1,500 mg, 1,500 mg, IntraVENous, Q12H, Chance Granados MD, 1,500 mg at 08/18/23 0810    amLODIPine (NORVASC) tablet 10 mg, 10 mg, Oral, Daily, Jazmin RHODES Nikita, DO, 10 mg at 08/18/23 0809    gabapentin (NEURONTIN) capsule 800 mg, 800 mg, Oral, TID, Jazmin J [place], DO, 800 mg at 08/18/23 0809    OXcarbazepine (TRILEPTAL) tablet 600 mg, 600 mg, Oral, TID, Jazmin J Nikita, DO, 600 mg at 08/18/23 0809    baclofen (LIORESAL) tablet 10 mg, 10 mg, Oral, Nightly, Jazmin J [place], DO, 10 mg at 08/17/23 2104    pantoprazole (PROTONIX) 40 mg in sodium chloride (PF) 0.9 % 10 mL injection, 40 mg, IntraVENous, Daily, Jazmin MEAGAN Shelton DO, 40 mg at 08/18/23 0809    chlorhexidine (PERIDEX) 0.12 % solution 15 mL, 15 mL, Mouth/Throat, BID, Jazmin Zhangon, DO, 15 mL at 08/18/23 0802    midazolam (VERSED) 100mg/100mL in NS infusion, 1-10 mg/hr, IntraVENous, Continuous, Jazmin Shelton DO, Stopped at 08/17/23 1717    [Held by provider] propofol infusion, 5-50 mcg/kg/min, IntraVENous, Continuous, Jazmin Shelton, DO, Last Rate: 11.3 mL/hr at 08/18/23 0938, 30 mcg/kg/min at 08/18/23 1794        Physician Interpretation    General EEG Report  ***    Type of EEG >>  ***          Epileptiform Discharge   ***    Non-Epileptiform Abnormality  ***    Ictal  ***    General Impression  ***    Coral Anthony

## 2023-08-19 LAB
ALBUMIN SERPL-MCNC: 3.5 G/DL (ref 3.5–5.2)
ALP SERPL-CCNC: 104 U/L (ref 35–104)
ALT SERPL-CCNC: 12 U/L (ref 0–32)
ANION GAP SERPL CALCULATED.3IONS-SCNC: 13 MMOL/L (ref 7–16)
ANION GAP SERPL CALCULATED.3IONS-SCNC: 14 MMOL/L (ref 7–16)
AST SERPL-CCNC: 25 U/L (ref 0–31)
B.E.: 2 MMOL/L (ref -3–3)
BASOPHILS # BLD: 0.05 K/UL (ref 0–0.2)
BASOPHILS NFR BLD: 1 % (ref 0–2)
BILIRUB SERPL-MCNC: 0.4 MG/DL (ref 0–1.2)
BUN SERPL-MCNC: 10 MG/DL (ref 6–23)
BUN SERPL-MCNC: 12 MG/DL (ref 6–23)
CALCIUM SERPL-MCNC: 8.6 MG/DL (ref 8.6–10.2)
CALCIUM SERPL-MCNC: 8.9 MG/DL (ref 8.6–10.2)
CHLORIDE SERPL-SCNC: 95 MMOL/L (ref 98–107)
CHLORIDE SERPL-SCNC: 96 MMOL/L (ref 98–107)
CO2 SERPL-SCNC: 24 MMOL/L (ref 22–29)
CO2 SERPL-SCNC: 25 MMOL/L (ref 22–29)
COHB: 0.3 % (ref 0–1.5)
CREAT SERPL-MCNC: 0.3 MG/DL (ref 0.5–1)
CREAT SERPL-MCNC: 0.4 MG/DL (ref 0.5–1)
CRITICAL: ABNORMAL
DATE ANALYZED: ABNORMAL
DATE OF COLLECTION: ABNORMAL
EOSINOPHIL # BLD: 0.18 K/UL (ref 0.05–0.5)
EOSINOPHILS RELATIVE PERCENT: 2 % (ref 0–6)
ERYTHROCYTE [DISTWIDTH] IN BLOOD BY AUTOMATED COUNT: 11.9 % (ref 11.5–15)
GFR SERPL CREATININE-BSD FRML MDRD: >60 ML/MIN/1.73M2
GFR SERPL CREATININE-BSD FRML MDRD: >60 ML/MIN/1.73M2
GLUCOSE BLD-MCNC: 125 MG/DL (ref 74–99)
GLUCOSE BLD-MCNC: 143 MG/DL (ref 74–99)
GLUCOSE SERPL-MCNC: 105 MG/DL (ref 74–99)
GLUCOSE SERPL-MCNC: 110 MG/DL (ref 74–99)
HCO3: 24.5 MMOL/L (ref 22–26)
HCT VFR BLD AUTO: 28.1 % (ref 34–48)
HGB BLD-MCNC: 9.3 G/DL (ref 11.5–15.5)
HHB: 4.2 % (ref 0–5)
IMM GRANULOCYTES # BLD AUTO: <0.03 K/UL (ref 0–0.58)
IMM GRANULOCYTES NFR BLD: 0 % (ref 0–5)
LAB: ABNORMAL
LYMPHOCYTES NFR BLD: 1.51 K/UL (ref 1.5–4)
LYMPHOCYTES RELATIVE PERCENT: 20 % (ref 20–42)
Lab: ABNORMAL
MAGNESIUM SERPL-MCNC: 2.1 MG/DL (ref 1.6–2.6)
MCH RBC QN AUTO: 30.3 PG (ref 26–35)
MCHC RBC AUTO-ENTMCNC: 33.1 G/DL (ref 32–34.5)
MCV RBC AUTO: 91.5 FL (ref 80–99.9)
METHB: 0.2 % (ref 0–1.5)
MODE: ABNORMAL
MONOCYTES NFR BLD: 0.48 K/UL (ref 0.1–0.95)
MONOCYTES NFR BLD: 6 % (ref 2–12)
NEUTROPHILS NFR BLD: 71 % (ref 43–80)
NEUTS SEG NFR BLD: 5.51 K/UL (ref 1.8–7.3)
O2 CONTENT: 14.5 ML/DL
O2 SATURATION: 95.8 % (ref 92–98.5)
O2HB: 95.3 % (ref 94–97)
OPERATOR ID: 1210
PATIENT TEMP: 37 C
PCO2: 30.8 MMHG (ref 35–45)
PH BLOOD GAS: 7.52 (ref 7.35–7.45)
PHOSPHATE SERPL-MCNC: 2.9 MG/DL (ref 2.5–4.5)
PLATELET CONFIRMATION: NORMAL
PLATELET, FLUORESCENCE: 356 K/UL (ref 130–450)
PMV BLD AUTO: 9.7 FL (ref 7–12)
PO2: 76.8 MMHG (ref 75–100)
POTASSIUM SERPL-SCNC: 3.6 MMOL/L (ref 3.5–5)
POTASSIUM SERPL-SCNC: 3.9 MMOL/L (ref 3.5–5)
PROT SERPL-MCNC: 7.2 G/DL (ref 6.4–8.3)
RBC # BLD AUTO: 3.07 M/UL (ref 3.5–5.5)
SODIUM SERPL-SCNC: 133 MMOL/L (ref 132–146)
SODIUM SERPL-SCNC: 134 MMOL/L (ref 132–146)
SODIUM UR-SCNC: <20 MMOL/L
SOURCE, BLOOD GAS: ABNORMAL
THB: 10.8 G/DL (ref 11.5–16.5)
TIME ANALYZED: 847
WBC OTHER # BLD: 7.8 K/UL (ref 4.5–11.5)

## 2023-08-19 PROCEDURE — 2580000003 HC RX 258: Performed by: INTERNAL MEDICINE

## 2023-08-19 PROCEDURE — 6370000000 HC RX 637 (ALT 250 FOR IP)

## 2023-08-19 PROCEDURE — 80053 COMPREHEN METABOLIC PANEL: CPT

## 2023-08-19 PROCEDURE — 84100 ASSAY OF PHOSPHORUS: CPT

## 2023-08-19 PROCEDURE — 6370000000 HC RX 637 (ALT 250 FOR IP): Performed by: INTERNAL MEDICINE

## 2023-08-19 PROCEDURE — 83735 ASSAY OF MAGNESIUM: CPT

## 2023-08-19 PROCEDURE — A4216 STERILE WATER/SALINE, 10 ML: HCPCS | Performed by: INTERNAL MEDICINE

## 2023-08-19 PROCEDURE — 2060000000 HC ICU INTERMEDIATE R&B

## 2023-08-19 PROCEDURE — 82805 BLOOD GASES W/O2 SATURATION: CPT

## 2023-08-19 PROCEDURE — 6360000002 HC RX W HCPCS

## 2023-08-19 PROCEDURE — 51701 INSERT BLADDER CATHETER: CPT

## 2023-08-19 PROCEDURE — 6360000002 HC RX W HCPCS: Performed by: INTERNAL MEDICINE

## 2023-08-19 PROCEDURE — 82962 GLUCOSE BLOOD TEST: CPT

## 2023-08-19 PROCEDURE — 36415 COLL VENOUS BLD VENIPUNCTURE: CPT

## 2023-08-19 PROCEDURE — 99233 SBSQ HOSP IP/OBS HIGH 50: CPT | Performed by: NURSE PRACTITIONER

## 2023-08-19 PROCEDURE — C9113 INJ PANTOPRAZOLE SODIUM, VIA: HCPCS | Performed by: INTERNAL MEDICINE

## 2023-08-19 PROCEDURE — 51798 US URINE CAPACITY MEASURE: CPT

## 2023-08-19 PROCEDURE — 80048 BASIC METABOLIC PNL TOTAL CA: CPT

## 2023-08-19 PROCEDURE — 85025 COMPLETE CBC W/AUTO DIFF WBC: CPT

## 2023-08-19 RX ORDER — OXYCODONE HYDROCHLORIDE AND ACETAMINOPHEN 5; 325 MG/1; MG/1
1 TABLET ORAL ONCE
Status: COMPLETED | OUTPATIENT
Start: 2023-08-19 | End: 2023-08-19

## 2023-08-19 RX ORDER — LEVETIRACETAM 500 MG/5ML
1000 INJECTION, SOLUTION, CONCENTRATE INTRAVENOUS EVERY 12 HOURS
Status: DISCONTINUED | OUTPATIENT
Start: 2023-08-19 | End: 2023-08-21

## 2023-08-19 RX ADMIN — HEPARIN SODIUM 5000 UNITS: 10000 INJECTION INTRAVENOUS; SUBCUTANEOUS at 05:02

## 2023-08-19 RX ADMIN — OXCARBAZEPINE 600 MG: 300 TABLET, FILM COATED ORAL at 13:42

## 2023-08-19 RX ADMIN — LEVETIRACETAM 1000 MG: 100 INJECTION INTRAVENOUS at 20:39

## 2023-08-19 RX ADMIN — BACLOFEN 10 MG: 10 TABLET ORAL at 20:42

## 2023-08-19 RX ADMIN — GABAPENTIN 800 MG: 300 CAPSULE ORAL at 20:39

## 2023-08-19 RX ADMIN — HEPARIN SODIUM 5000 UNITS: 10000 INJECTION INTRAVENOUS; SUBCUTANEOUS at 13:42

## 2023-08-19 RX ADMIN — LEVETIRACETAM 1500 MG: 100 INJECTION INTRAVENOUS at 08:30

## 2023-08-19 RX ADMIN — PANTOPRAZOLE SODIUM 40 MG: 40 INJECTION, POWDER, FOR SOLUTION INTRAVENOUS at 08:09

## 2023-08-19 RX ADMIN — OXYCODONE AND ACETAMINOPHEN 1 TABLET: 5; 325 TABLET ORAL at 06:27

## 2023-08-19 RX ADMIN — GABAPENTIN 800 MG: 300 CAPSULE ORAL at 08:30

## 2023-08-19 RX ADMIN — HYDRALAZINE HYDROCHLORIDE 10 MG: 20 INJECTION, SOLUTION INTRAMUSCULAR; INTRAVENOUS at 05:02

## 2023-08-19 RX ADMIN — OXCARBAZEPINE 600 MG: 300 TABLET, FILM COATED ORAL at 20:43

## 2023-08-19 RX ADMIN — GABAPENTIN 800 MG: 300 CAPSULE ORAL at 13:42

## 2023-08-19 RX ADMIN — HEPARIN SODIUM 5000 UNITS: 10000 INJECTION INTRAVENOUS; SUBCUTANEOUS at 20:39

## 2023-08-19 RX ADMIN — LISINOPRIL 40 MG: 20 TABLET ORAL at 08:08

## 2023-08-19 RX ADMIN — AMLODIPINE BESYLATE 10 MG: 10 TABLET ORAL at 08:09

## 2023-08-19 RX ADMIN — OXCARBAZEPINE 600 MG: 300 TABLET, FILM COATED ORAL at 08:30

## 2023-08-19 ASSESSMENT — PAIN SCALES - GENERAL
PAINLEVEL_OUTOF10: 0
PAINLEVEL_OUTOF10: 9
PAINLEVEL_OUTOF10: 0

## 2023-08-19 ASSESSMENT — PAIN DESCRIPTION - DESCRIPTORS: DESCRIPTORS: ACHING;STABBING

## 2023-08-19 ASSESSMENT — PAIN DESCRIPTION - LOCATION: LOCATION: HIP

## 2023-08-19 ASSESSMENT — PAIN DESCRIPTION - ORIENTATION: ORIENTATION: RIGHT

## 2023-08-19 NOTE — PROGRESS NOTES
Patient requesting dentures at this time. This RN looked through patient belongings and only found upper dentures. Per patient's , dentures were last placed in blue cup. During admission to MICU, only upper dentures documented in admission belongings.

## 2023-08-19 NOTE — PLAN OF CARE
Problem: Discharge Planning  Goal: Discharge to home or other facility with appropriate resources  8/19/2023 1015 by Meli Grijalva RN  Outcome: Progressing  Flowsheets (Taken 8/19/2023 0800)  Discharge to home or other facility with appropriate resources: Identify barriers to discharge with patient and caregiver     Problem: Safety - Adult  Goal: Free from fall injury  8/19/2023 1015 by Meli Grijalva RN  Outcome: Progressing  Flowsheets (Taken 8/19/2023 0800)  Free From Fall Injury: Instruct family/caregiver on patient safety     Problem: Pain  Goal: Verbalizes/displays adequate comfort level or baseline comfort level  8/19/2023 1015 by Meli Grijalva RN  Outcome: Progressing  Flowsheets (Taken 8/19/2023 0800)  Verbalizes/displays adequate comfort level or baseline comfort level: Encourage patient to monitor pain and request assistance     Problem: Skin/Tissue Integrity  Goal: Absence of new skin breakdown  Description: 1. Monitor for areas of redness and/or skin breakdown  2. Assess vascular access sites hourly  3. Every 4-6 hours minimum:  Change oxygen saturation probe site  4. Every 4-6 hours:  If on nasal continuous positive airway pressure, respiratory therapy assess nares and determine need for appliance change or resting period. 8/19/2023 1015 by Meli Grijalva RN  Outcome: Progressing     Problem: ABCDS Injury Assessment  Goal: Absence of physical injury  8/19/2023 1015 by Meli Grijalva RN  Outcome: Progressing  Flowsheets (Taken 8/19/2023 0800)  Absence of Physical Injury: Implement safety measures based on patient assessment     Problem: Safety - Medical Restraint  Goal: Remains free of injury from restraints (Restraint for Interference with Medical Device)  Description: INTERVENTIONS:  1. Determine that other, less restrictive measures have been tried or would not be effective before applying the restraint  2.  Evaluate the patient's condition at the time of

## 2023-08-19 NOTE — PLAN OF CARE
Patient heard calling out loud in the [address] for help. Patient found in bed with NGT in right hand stating she had just pulled it out accidentally and was apologetic about it. I offered tissue paper and turned off the TF.       Electronically signed by Yohana Castillo MD on 8/19/2023 at 8:04 AM

## 2023-08-19 NOTE — PLAN OF CARE
When unrestrained, patient continues to pull at lines/tubes that are necessary for providing care at this time. Education and redirection of the patient is not effective at this time. Patient remains in bilateral soft wrist restraints. Problem: Safety - Medical Restraint  Goal: Remains free of injury from restraints (Restraint for Interference with Medical Device)  Description: INTERVENTIONS:  1. Determine that other, less restrictive measures have been tried or would not be effective before applying the restraint  2. Evaluate the patient's condition at the time of restraint application  3. Inform patient/family regarding the reason for restraint  4.  Q2H: Monitor safety, psychosocial status, comfort, nutrition and hydration  8/19/2023 0250 by Tatum Turner RN  Outcome: Progressing  Flowsheets (Taken 8/19/2023 0250)  Remains free of injury from restraints (restraint for interference with medical device):   Determine that other, less restrictive measures have been tried or would not be effective before applying the restraint   Evaluate the patient's condition at the time of restraint application   Inform patient/family regarding the reason for restraint   Every 2 hours: Monitor safety, psychosocial status, comfort, nutrition and hydration

## 2023-08-19 NOTE — PROGRESS NOTES
4 Eyes Skin Assessment     NAME:  Zay Bell  YOB: 1955  MEDICAL RECORD NUMBER:  [id number]    The patient is being assessed for  Transfer to New Unit    I agree that at least one RN has performed a thorough Head to Toe Skin Assessment on the patient. ALL assessment sites listed below have been assessed. Areas assessed by both nurses:    Head, Face, Ears, Shoulders, Back, Chest, Arms, Elbows, Hands, Sacrum. Buttock, Coccyx, Ischium, Legs. Feet and Heels, and Under Medical Devices         Does the Patient have a Wound? No noted wound(s)       Leighton Prevention initiated by RN: Yes  Wound Care Orders initiated by RN: Yes    Pressure Injury (Stage 3,4, Unstageable, DTI, NWPT, and Complex wounds) if present, place Wound referral order by RN under : No    New Ostomies, if present place, Ostomy referral order under : No     Patient noted to have abrasions on her forehead and left knee, ecchymotic areas on her BUE and BLE, Right hip incision, and blanchable redness on her bilateral heels and coccyx.     Nurse 1 eSignature: Electronically signed by Memo Cotton RN on 8/19/23 at 5:47 PM EDT    **SHARE this note so that the co-signing nurse can place an eSignature**    Nurse 2 eSignature: Electronically signed by Sally Mixon RN on 8/19/23 at 6:37 PM EDT

## 2023-08-19 NOTE — PLAN OF CARE
Problem: Discharge Planning  Goal: Discharge to home or other facility with appropriate resources  8/19/2023 1624 by Kane Andujar RN  Outcome: Progressing     Problem: Safety - Adult  Goal: Free from fall injury  8/19/2023 1624 by Kane Andujar RN  Outcome: Progressing  Flowsheets (Taken 8/19/2023 1600)  Free From Fall Injury: Instruct family/caregiver on patient safety     Problem: Pain  Goal: Verbalizes/displays adequate comfort level or baseline comfort level  8/19/2023 1624 by Kane Andujar RN  Outcome: Progressing     Problem: Skin/Tissue Integrity  Goal: Absence of new skin breakdown  Description: 1. Monitor for areas of redness and/or skin breakdown  2. Assess vascular access sites hourly  3. Every 4-6 hours minimum:  Change oxygen saturation probe site  4. Every 4-6 hours:  If on nasal continuous positive airway pressure, respiratory therapy assess nares and determine need for appliance change or resting period. 8/19/2023 1624 by Kane Andujar RN  Outcome: Progressing     Problem: ABCDS Injury Assessment  Goal: Absence of physical injury  8/19/2023 1624 by Kane Andujar RN  Outcome: Progressing  Flowsheets (Taken 8/19/2023 1600)  Absence of Physical Injury: Implement safety measures based on patient assessment     Problem: Safety - Medical Restraint  Goal: Remains free of injury from restraints (Restraint for Interference with Medical Device)  Description: INTERVENTIONS:  1. Determine that other, less restrictive measures have been tried or would not be effective before applying the restraint  2. Evaluate the patient's condition at the time of restraint application  3. Inform patient/family regarding the reason for restraint  4.  Q2H: Monitor safety, psychosocial status, comfort, nutrition and hydration  8/19/2023 1624 by Kane Andujar RN  Outcome: Progressing     Problem: Nutrition Deficit:  Goal: Optimize nutritional status  8/19/2023 1624 by Bobby Mcclure

## 2023-08-19 NOTE — PLAN OF CARE
Problem: Safety - Adult  Goal: Free from fall injury  8/19/2023 0250 by Priya Lambert RN  Outcome: Progressing  Flowsheets (Taken 8/19/2023 0250)  Free From Fall Injury:   Instruct family/caregiver on patient safety   Based on caregiver fall risk screen, instruct family/caregiver to ask for assistance with transferring infant if caregiver noted to have fall risk factors  8/18/2023 1629 by Marciano Araujo  Outcome: Progressing     Problem: Skin/Tissue Integrity  Goal: Absence of new skin breakdown  Description: 1. Monitor for areas of redness and/or skin breakdown  2. Assess vascular access sites hourly  3. Every 4-6 hours minimum:  Change oxygen saturation probe site  4. Every 4-6 hours:  If on nasal continuous positive airway pressure, respiratory therapy assess nares and determine need for appliance change or resting period. 8/19/2023 0250 by Priya Lambert RN  Outcome: Progressing  8/18/2023 1629 by Mary Anne Mccoy  Outcome: Progressing     Problem: Neurosensory - Adult  Goal: Achieves stable or improved neurological status  Outcome: Progressing  Flowsheets (Taken 8/19/2023 0250)  Achieves stable or improved neurological status:   Assess for and report changes in neurological status   Initiate measures to prevent increased intracranial pressure   Maintain blood pressure and fluid volume within ordered parameters to optimize cerebral perfusion and minimize risk of hemorrhage   Monitor temperature, glucose, and sodium. Initiate appropriate interventions as ordered     Problem: Neurosensory - Adult  Goal: Absence of seizures  Outcome: Progressing  Flowsheets (Taken 8/19/2023 0250)  Absence of seizures:   Monitor for seizure activity.   If seizure occurs, document type and location of movements and any associated apnea   If seizure occurs, turn head to side and suction secretions as needed   Administer anticonvulsants as ordered   Support airway/breathing, administer oxygen as needed   Diagnostic studies as

## 2023-08-19 NOTE — PROGRESS NOTES
While cleaning patient's room after discharge from the unit, bottom dentures found in separate blue cup beneath sink. Teeth returned to patient on [address]. Patient now has both upper and lower dentures.

## 2023-08-19 NOTE — PROGRESS NOTES
Called report to [facility] on PICU. Transport requested. All patient's belongings packed and in bed with patient.  Family notified

## 2023-08-20 LAB
ALBUMIN SERPL-MCNC: 3.1 G/DL (ref 3.5–5.2)
ALP SERPL-CCNC: 86 U/L (ref 35–104)
ALT SERPL-CCNC: 10 U/L (ref 0–32)
ANION GAP SERPL CALCULATED.3IONS-SCNC: 12 MMOL/L (ref 7–16)
ANION GAP SERPL CALCULATED.3IONS-SCNC: 12 MMOL/L (ref 7–16)
AST SERPL-CCNC: 16 U/L (ref 0–31)
BACTERIA URNS QL MICRO: ABNORMAL
BASOPHILS # BLD: 0.04 K/UL (ref 0–0.2)
BASOPHILS NFR BLD: 1 % (ref 0–2)
BILIRUB SERPL-MCNC: 0.3 MG/DL (ref 0–1.2)
BILIRUB UR QL STRIP: NEGATIVE
BUN SERPL-MCNC: 11 MG/DL (ref 6–23)
BUN SERPL-MCNC: 13 MG/DL (ref 6–23)
CALCIUM SERPL-MCNC: 8.6 MG/DL (ref 8.6–10.2)
CALCIUM SERPL-MCNC: 8.8 MG/DL (ref 8.6–10.2)
CHLORIDE SERPL-SCNC: 96 MMOL/L (ref 98–107)
CHLORIDE SERPL-SCNC: 97 MMOL/L (ref 98–107)
CLARITY UR: CLEAR
CO2 SERPL-SCNC: 23 MMOL/L (ref 22–29)
CO2 SERPL-SCNC: 24 MMOL/L (ref 22–29)
COLOR UR: YELLOW
CREAT SERPL-MCNC: 0.3 MG/DL (ref 0.5–1)
CREAT SERPL-MCNC: 0.3 MG/DL (ref 0.5–1)
EOSINOPHIL # BLD: 0.22 K/UL (ref 0.05–0.5)
EOSINOPHILS RELATIVE PERCENT: 3 % (ref 0–6)
ERYTHROCYTE [DISTWIDTH] IN BLOOD BY AUTOMATED COUNT: 12.1 % (ref 11.5–15)
GFR SERPL CREATININE-BSD FRML MDRD: >60 ML/MIN/1.73M2
GFR SERPL CREATININE-BSD FRML MDRD: >60 ML/MIN/1.73M2
GLUCOSE BLD-MCNC: 120 MG/DL (ref 74–99)
GLUCOSE SERPL-MCNC: 102 MG/DL (ref 74–99)
GLUCOSE SERPL-MCNC: 131 MG/DL (ref 74–99)
GLUCOSE UR STRIP-MCNC: NEGATIVE MG/DL
HCT VFR BLD AUTO: 25.8 % (ref 34–48)
HGB BLD-MCNC: 8.6 G/DL (ref 11.5–15.5)
HGB UR QL STRIP.AUTO: ABNORMAL
IMM GRANULOCYTES # BLD AUTO: 0.07 K/UL (ref 0–0.58)
IMM GRANULOCYTES NFR BLD: 1 % (ref 0–5)
IRON SATN MFR SERPL: 10 % (ref 15–50)
IRON SERPL-MCNC: 16 UG/DL (ref 37–145)
KETONES UR STRIP-MCNC: NEGATIVE MG/DL
LEUKOCYTE ESTERASE UR QL STRIP: NEGATIVE
LYMPHOCYTES NFR BLD: 1.47 K/UL (ref 1.5–4)
LYMPHOCYTES RELATIVE PERCENT: 21 % (ref 20–42)
MAGNESIUM SERPL-MCNC: 1.9 MG/DL (ref 1.6–2.6)
MCH RBC QN AUTO: 30.4 PG (ref 26–35)
MCHC RBC AUTO-ENTMCNC: 33.3 G/DL (ref 32–34.5)
MCV RBC AUTO: 91.2 FL (ref 80–99.9)
MONOCYTES NFR BLD: 0.68 K/UL (ref 0.1–0.95)
MONOCYTES NFR BLD: 10 % (ref 2–12)
NEUTROPHILS NFR BLD: 65 % (ref 43–80)
NEUTS SEG NFR BLD: 4.53 K/UL (ref 1.8–7.3)
NITRITE UR QL STRIP: NEGATIVE
PH UR STRIP: 7 [PH] (ref 5–9)
PHOSPHATE SERPL-MCNC: 3.3 MG/DL (ref 2.5–4.5)
PLATELET # BLD AUTO: 329 K/UL (ref 130–450)
PMV BLD AUTO: 9.1 FL (ref 7–12)
POTASSIUM SERPL-SCNC: 3.7 MMOL/L (ref 3.5–5)
POTASSIUM SERPL-SCNC: 3.7 MMOL/L (ref 3.5–5)
PROT SERPL-MCNC: 6.4 G/DL (ref 6.4–8.3)
PROT UR STRIP-MCNC: NEGATIVE MG/DL
RBC # BLD AUTO: 2.83 M/UL (ref 3.5–5.5)
RBC #/AREA URNS HPF: ABNORMAL /HPF
SODIUM SERPL-SCNC: 131 MMOL/L (ref 132–146)
SODIUM SERPL-SCNC: 133 MMOL/L (ref 132–146)
SP GR UR STRIP: 1.01 (ref 1–1.03)
TIBC SERPL-MCNC: 157 UG/DL (ref 250–450)
UROBILINOGEN UR STRIP-ACNC: 1 EU/DL (ref 0–1)
WBC #/AREA URNS HPF: ABNORMAL /HPF
WBC OTHER # BLD: 7 K/UL (ref 4.5–11.5)

## 2023-08-20 PROCEDURE — 2060000000 HC ICU INTERMEDIATE R&B

## 2023-08-20 PROCEDURE — C9113 INJ PANTOPRAZOLE SODIUM, VIA: HCPCS | Performed by: INTERNAL MEDICINE

## 2023-08-20 PROCEDURE — 84100 ASSAY OF PHOSPHORUS: CPT

## 2023-08-20 PROCEDURE — 6370000000 HC RX 637 (ALT 250 FOR IP): Performed by: INTERNAL MEDICINE

## 2023-08-20 PROCEDURE — 6360000002 HC RX W HCPCS: Performed by: INTERNAL MEDICINE

## 2023-08-20 PROCEDURE — 6370000000 HC RX 637 (ALT 250 FOR IP)

## 2023-08-20 PROCEDURE — 36415 COLL VENOUS BLD VENIPUNCTURE: CPT

## 2023-08-20 PROCEDURE — 87086 URINE CULTURE/COLONY COUNT: CPT

## 2023-08-20 PROCEDURE — 81001 URINALYSIS AUTO W/SCOPE: CPT

## 2023-08-20 PROCEDURE — 82962 GLUCOSE BLOOD TEST: CPT

## 2023-08-20 PROCEDURE — 2580000003 HC RX 258: Performed by: INTERNAL MEDICINE

## 2023-08-20 PROCEDURE — 83735 ASSAY OF MAGNESIUM: CPT

## 2023-08-20 PROCEDURE — 80053 COMPREHEN METABOLIC PANEL: CPT

## 2023-08-20 PROCEDURE — A4216 STERILE WATER/SALINE, 10 ML: HCPCS | Performed by: INTERNAL MEDICINE

## 2023-08-20 PROCEDURE — 85025 COMPLETE CBC W/AUTO DIFF WBC: CPT

## 2023-08-20 PROCEDURE — 80048 BASIC METABOLIC PNL TOTAL CA: CPT

## 2023-08-20 RX ORDER — OXYCODONE HYDROCHLORIDE 5 MG/1
5 TABLET ORAL EVERY 6 HOURS PRN
Status: DISCONTINUED | OUTPATIENT
Start: 2023-08-20 | End: 2023-08-21 | Stop reason: HOSPADM

## 2023-08-20 RX ADMIN — LISINOPRIL 40 MG: 20 TABLET ORAL at 08:16

## 2023-08-20 RX ADMIN — GABAPENTIN 800 MG: 300 CAPSULE ORAL at 13:58

## 2023-08-20 RX ADMIN — LEVETIRACETAM 1000 MG: 100 INJECTION INTRAVENOUS at 08:19

## 2023-08-20 RX ADMIN — LEVETIRACETAM 1000 MG: 100 INJECTION INTRAVENOUS at 20:08

## 2023-08-20 RX ADMIN — HEPARIN SODIUM 5000 UNITS: 10000 INJECTION INTRAVENOUS; SUBCUTANEOUS at 20:10

## 2023-08-20 RX ADMIN — HEPARIN SODIUM 5000 UNITS: 10000 INJECTION INTRAVENOUS; SUBCUTANEOUS at 05:08

## 2023-08-20 RX ADMIN — BACLOFEN 10 MG: 10 TABLET ORAL at 20:09

## 2023-08-20 RX ADMIN — HEPARIN SODIUM 5000 UNITS: 10000 INJECTION INTRAVENOUS; SUBCUTANEOUS at 13:58

## 2023-08-20 RX ADMIN — OXCARBAZEPINE 600 MG: 300 TABLET, FILM COATED ORAL at 08:20

## 2023-08-20 RX ADMIN — GABAPENTIN 800 MG: 300 CAPSULE ORAL at 08:16

## 2023-08-20 RX ADMIN — GABAPENTIN 800 MG: 300 CAPSULE ORAL at 20:09

## 2023-08-20 RX ADMIN — PANTOPRAZOLE SODIUM 40 MG: 40 INJECTION, POWDER, FOR SOLUTION INTRAVENOUS at 08:19

## 2023-08-20 RX ADMIN — OXCARBAZEPINE 600 MG: 300 TABLET, FILM COATED ORAL at 13:58

## 2023-08-20 RX ADMIN — OXCARBAZEPINE 600 MG: 300 TABLET, FILM COATED ORAL at 20:09

## 2023-08-20 RX ADMIN — AMLODIPINE BESYLATE 10 MG: 10 TABLET ORAL at 08:16

## 2023-08-20 ASSESSMENT — PAIN SCALES - GENERAL
PAINLEVEL_OUTOF10: 0

## 2023-08-20 NOTE — DISCHARGE INSTRUCTIONS
**your prescriptions for aspirin and oxycodone were previously sent to [facility] outpatient pharmacy- please pickup those scripts there**      **someone should be contacting you tomorrow regarding home healthcare setup for physical and occupational therapy. DISCHARGE INSTRUCTIONS FOR TOTAL HIP REPLACEMENT        Prevent blood clots - you are at risk post operatively for DVT (Deep vein    thrombosis and / or PE (Pulmonary Embolism)       Continue antiembolic stockings (NOELLE hose) for 4 weeks from date of surgery. Remove stockings every eight hours. Check skin for redness or any breakdowns on heels and over outer ankle bones. Do not roll stockings down or fold over (this may cause a band of constriction    interfering with circulation and increasing your risk of a blood clot forming or a    skin breakdown. If you have not been wearing your stockings and notice increased swelling or    excessive swelling in your extremity then: Lie down and elevate legs for 20-30   minutes. Apply stockings. If swelling does not improve, call office. REMEMBER: Mild to moderate swelling of the operative limb is expected    for some time after surgery. Take frequent walks around  your home. Be careful outdoors- watch for uneven ground and pavement, curbs and holes. Gradually increase your activity to prevent fatigue. When at rest (sitting in a chair or lying down,resting) continue circulation                exercises at least every hour - foot flaps, ankle rolls, quad and glut sets. You will continue one of the following blood thinners at home. Lovenox -  An injection once or twice a day if you have a history of blood clots,    cancer, stomach ulcers or gastrointestinal bleeding. Aspirin - 325 mg twice daily if no history of the above ailments.    Coumadin - if held before surgery, your family doctor will be responsible for   managing and ordering this medication upon your

## 2023-08-21 VITALS
RESPIRATION RATE: 18 BRPM | HEART RATE: 103 BPM | SYSTOLIC BLOOD PRESSURE: 110 MMHG | WEIGHT: 150.79 LBS | OXYGEN SATURATION: 100 % | HEIGHT: 63 IN | TEMPERATURE: 98.3 F | BODY MASS INDEX: 26.72 KG/M2 | DIASTOLIC BLOOD PRESSURE: 95 MMHG

## 2023-08-21 LAB
ALBUMIN SERPL-MCNC: 2.9 G/DL (ref 3.5–5.2)
ALP SERPL-CCNC: 82 U/L (ref 35–104)
ALT SERPL-CCNC: 14 U/L (ref 0–32)
ANION GAP SERPL CALCULATED.3IONS-SCNC: 11 MMOL/L (ref 7–16)
ANION GAP SERPL CALCULATED.3IONS-SCNC: 12 MMOL/L (ref 7–16)
ANION GAP SERPL CALCULATED.3IONS-SCNC: 12 MMOL/L (ref 7–16)
AST SERPL-CCNC: 17 U/L (ref 0–31)
BASOPHILS # BLD: 0.04 K/UL (ref 0–0.2)
BASOPHILS NFR BLD: 1 % (ref 0–2)
BILIRUB SERPL-MCNC: 0.3 MG/DL (ref 0–1.2)
BUN SERPL-MCNC: 10 MG/DL (ref 6–23)
BUN SERPL-MCNC: 11 MG/DL (ref 6–23)
BUN SERPL-MCNC: 12 MG/DL (ref 6–23)
CALCIUM SERPL-MCNC: 8.3 MG/DL (ref 8.6–10.2)
CALCIUM SERPL-MCNC: 8.8 MG/DL (ref 8.6–10.2)
CALCIUM SERPL-MCNC: 8.8 MG/DL (ref 8.6–10.2)
CHLORIDE SERPL-SCNC: 96 MMOL/L (ref 98–107)
CHLORIDE SERPL-SCNC: 97 MMOL/L (ref 98–107)
CHLORIDE SERPL-SCNC: 98 MMOL/L (ref 98–107)
CO2 SERPL-SCNC: 24 MMOL/L (ref 22–29)
CO2 SERPL-SCNC: 25 MMOL/L (ref 22–29)
CO2 SERPL-SCNC: 26 MMOL/L (ref 22–29)
CREAT SERPL-MCNC: 0.3 MG/DL (ref 0.5–1)
CREAT SERPL-MCNC: 0.4 MG/DL (ref 0.5–1)
CREAT SERPL-MCNC: 0.4 MG/DL (ref 0.5–1)
EOSINOPHIL # BLD: 0.28 K/UL (ref 0.05–0.5)
EOSINOPHILS RELATIVE PERCENT: 5 % (ref 0–6)
ERYTHROCYTE [DISTWIDTH] IN BLOOD BY AUTOMATED COUNT: 12.2 % (ref 11.5–15)
GFR SERPL CREATININE-BSD FRML MDRD: >60 ML/MIN/1.73M2
GLUCOSE BLD-MCNC: 106 MG/DL (ref 74–99)
GLUCOSE BLD-MCNC: 143 MG/DL (ref 74–99)
GLUCOSE SERPL-MCNC: 100 MG/DL (ref 74–99)
GLUCOSE SERPL-MCNC: 134 MG/DL (ref 74–99)
GLUCOSE SERPL-MCNC: 99 MG/DL (ref 74–99)
HCT VFR BLD AUTO: 24.6 % (ref 34–48)
HGB BLD-MCNC: 8.1 G/DL (ref 11.5–15.5)
IMM GRANULOCYTES # BLD AUTO: 0.03 K/UL (ref 0–0.58)
IMM GRANULOCYTES NFR BLD: 1 % (ref 0–5)
LYMPHOCYTES NFR BLD: 1.64 K/UL (ref 1.5–4)
LYMPHOCYTES RELATIVE PERCENT: 27 % (ref 20–42)
MAGNESIUM SERPL-MCNC: 1.7 MG/DL (ref 1.6–2.6)
MCH RBC QN AUTO: 30.3 PG (ref 26–35)
MCHC RBC AUTO-ENTMCNC: 32.9 G/DL (ref 32–34.5)
MCV RBC AUTO: 92.1 FL (ref 80–99.9)
MONOCYTES NFR BLD: 0.67 K/UL (ref 0.1–0.95)
MONOCYTES NFR BLD: 11 % (ref 2–12)
NEUTROPHILS NFR BLD: 57 % (ref 43–80)
NEUTS SEG NFR BLD: 3.49 K/UL (ref 1.8–7.3)
PHOSPHATE SERPL-MCNC: 3.9 MG/DL (ref 2.5–4.5)
PLATELET # BLD AUTO: 347 K/UL (ref 130–450)
PMV BLD AUTO: 9 FL (ref 7–12)
POTASSIUM SERPL-SCNC: 3.7 MMOL/L (ref 3.5–5)
POTASSIUM SERPL-SCNC: 3.8 MMOL/L (ref 3.5–5)
POTASSIUM SERPL-SCNC: 4 MMOL/L (ref 3.5–5)
PROT SERPL-MCNC: 6 G/DL (ref 6.4–8.3)
RBC # BLD AUTO: 2.67 M/UL (ref 3.5–5.5)
SODIUM SERPL-SCNC: 133 MMOL/L (ref 132–146)
SODIUM SERPL-SCNC: 134 MMOL/L (ref 132–146)
SODIUM SERPL-SCNC: 134 MMOL/L (ref 132–146)
WBC OTHER # BLD: 6.2 K/UL (ref 4.5–11.5)

## 2023-08-21 PROCEDURE — 6360000002 HC RX W HCPCS: Performed by: INTERNAL MEDICINE

## 2023-08-21 PROCEDURE — 97535 SELF CARE MNGMENT TRAINING: CPT

## 2023-08-21 PROCEDURE — 2580000003 HC RX 258: Performed by: INTERNAL MEDICINE

## 2023-08-21 PROCEDURE — 85025 COMPLETE CBC W/AUTO DIFF WBC: CPT

## 2023-08-21 PROCEDURE — 36415 COLL VENOUS BLD VENIPUNCTURE: CPT

## 2023-08-21 PROCEDURE — 97530 THERAPEUTIC ACTIVITIES: CPT

## 2023-08-21 PROCEDURE — 83735 ASSAY OF MAGNESIUM: CPT

## 2023-08-21 PROCEDURE — 80048 BASIC METABOLIC PNL TOTAL CA: CPT

## 2023-08-21 PROCEDURE — 80053 COMPREHEN METABOLIC PANEL: CPT

## 2023-08-21 PROCEDURE — 97165 OT EVAL LOW COMPLEX 30 MIN: CPT

## 2023-08-21 PROCEDURE — 6370000000 HC RX 637 (ALT 250 FOR IP): Performed by: INTERNAL MEDICINE

## 2023-08-21 PROCEDURE — 84100 ASSAY OF PHOSPHORUS: CPT

## 2023-08-21 PROCEDURE — 97161 PT EVAL LOW COMPLEX 20 MIN: CPT

## 2023-08-21 PROCEDURE — 82962 GLUCOSE BLOOD TEST: CPT

## 2023-08-21 PROCEDURE — C9113 INJ PANTOPRAZOLE SODIUM, VIA: HCPCS | Performed by: INTERNAL MEDICINE

## 2023-08-21 PROCEDURE — A4216 STERILE WATER/SALINE, 10 ML: HCPCS | Performed by: INTERNAL MEDICINE

## 2023-08-21 PROCEDURE — 6370000000 HC RX 637 (ALT 250 FOR IP)

## 2023-08-21 PROCEDURE — 6370000000 HC RX 637 (ALT 250 FOR IP): Performed by: FAMILY MEDICINE

## 2023-08-21 RX ORDER — LEVETIRACETAM 1000 MG/1
1000 TABLET ORAL 2 TIMES DAILY
Qty: 60 TABLET | Refills: 3 | Status: SHIPPED | OUTPATIENT
Start: 2023-08-21

## 2023-08-21 RX ORDER — LEVETIRACETAM 500 MG/1
1000 TABLET ORAL 2 TIMES DAILY
Status: DISCONTINUED | OUTPATIENT
Start: 2023-08-21 | End: 2023-08-21 | Stop reason: HOSPADM

## 2023-08-21 RX ADMIN — OXCARBAZEPINE 600 MG: 300 TABLET, FILM COATED ORAL at 15:54

## 2023-08-21 RX ADMIN — HEPARIN SODIUM 5000 UNITS: 10000 INJECTION INTRAVENOUS; SUBCUTANEOUS at 05:06

## 2023-08-21 RX ADMIN — LISINOPRIL 40 MG: 20 TABLET ORAL at 10:29

## 2023-08-21 RX ADMIN — HEPARIN SODIUM 5000 UNITS: 10000 INJECTION INTRAVENOUS; SUBCUTANEOUS at 15:54

## 2023-08-21 RX ADMIN — OXCARBAZEPINE 600 MG: 300 TABLET, FILM COATED ORAL at 10:29

## 2023-08-21 RX ADMIN — LEVETIRACETAM 1000 MG: 500 TABLET, FILM COATED ORAL at 19:41

## 2023-08-21 RX ADMIN — GABAPENTIN 800 MG: 300 CAPSULE ORAL at 10:29

## 2023-08-21 RX ADMIN — LEVETIRACETAM 1000 MG: 500 TABLET, FILM COATED ORAL at 10:30

## 2023-08-21 RX ADMIN — OXYCODONE HYDROCHLORIDE 5 MG: 5 TABLET ORAL at 11:57

## 2023-08-21 RX ADMIN — AMLODIPINE BESYLATE 10 MG: 10 TABLET ORAL at 10:30

## 2023-08-21 RX ADMIN — PANTOPRAZOLE SODIUM 40 MG: 40 INJECTION, POWDER, FOR SOLUTION INTRAVENOUS at 10:30

## 2023-08-21 RX ADMIN — GABAPENTIN 800 MG: 300 CAPSULE ORAL at 15:54

## 2023-08-21 ASSESSMENT — PAIN DESCRIPTION - ONSET
ONSET: ON-GOING
ONSET: ON-GOING

## 2023-08-21 ASSESSMENT — PAIN DESCRIPTION - FREQUENCY
FREQUENCY: CONTINUOUS
FREQUENCY: CONTINUOUS

## 2023-08-21 ASSESSMENT — PAIN DESCRIPTION - PAIN TYPE
TYPE: ACUTE PAIN;CHRONIC PAIN
TYPE: ACUTE PAIN;SURGICAL PAIN

## 2023-08-21 ASSESSMENT — PAIN SCALES - GENERAL
PAINLEVEL_OUTOF10: 0
PAINLEVEL_OUTOF10: 8
PAINLEVEL_OUTOF10: 0
PAINLEVEL_OUTOF10: 0
PAINLEVEL_OUTOF10: 7
PAINLEVEL_OUTOF10: 6

## 2023-08-21 ASSESSMENT — PAIN - FUNCTIONAL ASSESSMENT
PAIN_FUNCTIONAL_ASSESSMENT: PREVENTS OR INTERFERES SOME ACTIVE ACTIVITIES AND ADLS
PAIN_FUNCTIONAL_ASSESSMENT: PREVENTS OR INTERFERES SOME ACTIVE ACTIVITIES AND ADLS

## 2023-08-21 ASSESSMENT — PAIN DESCRIPTION - DESCRIPTORS
DESCRIPTORS: ACHING;DISCOMFORT
DESCRIPTORS: ACHING;DISCOMFORT

## 2023-08-21 ASSESSMENT — PAIN DESCRIPTION - ORIENTATION
ORIENTATION: RIGHT;LEFT
ORIENTATION: RIGHT;LEFT

## 2023-08-21 ASSESSMENT — PAIN DESCRIPTION - LOCATION: LOCATION: HIP;KNEE

## 2023-08-21 NOTE — ACP (ADVANCE CARE PLANNING)
Advance Care Planning   Healthcare Decision Maker:    Primary Decision Maker: Maria De Jesus Latisha Spouse - [phone]    Secondary Decision Maker: Romy Dixie - Child - [phone]    Click here to complete Healthcare Decision Makers including selection of the Healthcare Decision Maker Relationship (ie \"Primary\").

## 2023-08-21 NOTE — CARE COORDINATION
CM Update: Pt planning to return Home at discharge. [facility] following (referral called while at [facility]). She will need orders placed at discharge. Awaiting therapy evals to determine home going needs vs [facility]. Pt transferred from [facility] after having a seizure s/p right hip replacement. Seizure precautions in place. Keppra and Trileptal continue. Neuro and Ortho following.  CM/SW to follow (TF)    Imer Gutierrez, [facility]  Case Management  [phone]

## 2023-08-21 NOTE — PROGRESS NOTES
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     follows with PCP    Arthritis     Depression     Self abuse    Difficult intravenous access     History of suicide attempt 2022    Hypertension     Ingestion of substance 2022    Trigeminal neuralgia 2005    diagnosed      Past Surgical History:   Procedure Laterality Date    BRAIN SURGERY      decompression trigeminal nerve    CARPAL TUNNEL RELEASE Bilateral     FACIAL NERVE DECOMPRESSION      JOINT REPLACEMENT      WILMAN AND BSO (CERVIX REMOVED)      TOTAL HIP ARTHROPLASTY Right 2023    RIGHT TOTAL HIP ARTHROPLASTY performed by Hima Chavarria MD at [facility] OR      Procedure/Surgery:  none this admission  Precautions:  Fall Risk, WBAT RLE- recent MICKEY 23, Posterolateral hip precautions, seizure precautions, + Bed Alarm  Equipment Needs:  TBD    SUBJECTIVE:    Pt lives with her  in a 2 story home with 5 stairs to enter and 2 rail. Bed is on 1 floor and bath is in the basement and 2nd floor. Pt sleeps in a recliner on the 1st floor and is planning to use a bsc. Pt ambulated with SPC PTA. Owns [employer], and [facility]    OBJECTIVE:   Initial Evaluation  Date: 23 Treatment Short Term/ Long Term   Goals   AM-PAC 6 Clicks 63/39     Was pt agreeable to Eval/treatment? yes     Does pt have pain? Pain in RLE, not rated     Bed Mobility  Rolling: NT  Supine to sit: Sup  Sit to supine: NT  Scooting: Sup  Rolling: Independent   Supine to sit:  Independent   Sit to supine: Independent   Scooting: Independent    Transfers Sit to stand: Sup  Stand to sit: Sup  Stand pivot: Sup with [employer]  Sit to stand: independence with all functional transfers   [x] Gait Training to improve gait mechanics, endurance and assess need for appropriate assistive device  [x] Stair Training in preparation for safe discharge home and/or into the community   [x] Positioning to prevent skin breakdown and contractures  [x] Safety and Education Training   [x] Patient/Caregiver Education   [] HEP  [x] Other     PT long term treatment goals are located in above grid    Frequency of treatments: 2-5x/week x 1-2 weeks. Time in  1305  Time out  1330    Total Treatment Time  10 minutes     Evaluation Time includes thorough review of current medical information, gathering information on past medical history/social history and prior level of function, completion of standardized testing/informal observation of tasks, assessment of data and education on plan of care and goals.     CPT codes:  [x] Low Complexity PT evaluation 46585  [] Moderate Complexity PT evaluation 05550  [] High Complexity PT evaluation 31476  [] PT Re-evaluation 66786  [] Gait training 41457 0 minutes  [] Manual therapy 01729 0 minutes  [x] Therapeutic activities 93833 10 minutes  [] Therapeutic exercises 50280 0 minutes  [] Neuromuscular reeducation 92481 0 minutes     Bianca Dias PT, DPT  [id number]

## 2023-08-21 NOTE — PROGRESS NOTES
[address]      TFRZ:                                                  Patient Name: Zahira Merida  MRN: [id number]  : 1955  Room: [address] [place] [address]    Evaluating OT: PARUL Schultz, OTR/L  # [id number]    Referring Provider:  Jarrod Stout MD  Specific Provider Orders:  Christian Colvin and Treat\"  23    Diagnosis: Seizure disorder [facility]) [C92.290]    Pt was initially admitted to [facility] on 23 for pre-scheduled Right Hip Hemiarthroplasty. During hospitalization, RRT for Seizure, transferred to ICU and Intubated on 23. Transferred to [facility] for Neurology consult same day. 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     Surgeries this admission: 23:  R MICKEY     Precautions:  Fall Risk  Posterolateral Hip Precautions  Abduction Pillow b/t knees in bed/chair  Seizure Precautions  Minced/Moist Solids, Thin liquids  Anxiety    Assessment of current deficits   [x] Functional mobility  [x]ADLs  [x] Strength               [x]Cognition   [x] Functional transfers   [x] IADLs         [x] Safety Awareness   [x]Endurance   [] Fine Coordination              [x] Balance     [] Vision/perception   []Sensation    []Gross Motor Coordination  [x] ROM  [] Delirium                  [] Motor Control OT PLAN OF CARE   OT POC based on physician orders, patient diagnosis and results of clinical assessment    Frequency/Duration 1-3 days/wk for 2 weeks PRN   Specific OT Treatment to include:   * Instruction/training on adapted ADL techniques and AE recommendations to increase functional independence within precautions       * Training on energy conservation strategies, correct breathing pattern and techniques to improve independence/tolerance for self-care routine  * Functional transfer/mobility training/DME recommendations for increased independence, safety, and fall prevention  * Patient/Family education to increase follow through with safety techniques and functional independence  * Recommendation of environmental modifications for increased safety with functional transfers/mobility and ADLs  * Cognitive retraining/development of therapeutic activities to improve problem solving, judgement, memory, and attention for increased safety/participation in ADL/IADL tasks  * Therapeutic exercise to improve motor endurance, ROM, and functional strength for ADLs/functional transfers  * Therapeutic activities to facilitate/challenge dynamic balance, stand tolerance for increased safety and independence with ADLs  * Therapeutic activities to facilitate gross/fine motor skills for increased independence with ADLs  * Neuro-muscular re-education: facilitation of righting/equilibrium reactions  * Positioning to improve skin integrity, interaction with environment and functional independence  * Delirium prevention/treatment  * Manual techniques for edema management  Other:    Recommended Adaptive Equipment: TBD as pt progresses - Hip Kit issued at other facility post-op      Home Living:  Pt lives with her  in a 2-story house. Bed/bath on the 2nd floor, No Bathroom on 1st floor.  (+) Basement.    Bathroom setup:  Walk-in-Shower, [address] [place] [address]   Equipment owned:  Raised commode seat w/ Hand-rests, BSC, completion of ADLs, use of DME/AD/Adaptive equip: within precautions   Instruction/training on safe functional mobility/transfer techniques, use of DME/AD: within precautions      Instruction/training on energy conservation techs (EC)/Work Simplification/Pursed-Lip Breathing (PLB) for completion of ADLs:     Neuromuscular Reeducation to facilitate balance/righting reactions for increased function with ADLs:    Skilled positioning/alignment for Pain Mgmt, Skin Integrity, Edema Control, to maximize Pt's safety and ability to safely and INDly interact w/ his/her environment, maximize respiratory status  Activity tolerance - Sitting/Standing to improve endurance w/ functional ax   Cognitive retraining -  Oriented pt to current Date, Place and Situation; Cues for safety/safety awareness, sequencing, problem solving - educated on precautions, adherence to precautions w/ Completion of functional ax/mobility   Skilled monitoring of pt's response to tx ax    Pt/family ed re: benefits of participate in post-acute therapy program - Sub-acute Rehab    Consulted RN, PT    Made all appropriate Environmental Modifications to facilitate pt's level of IND and safety. Recommendations for Continued Participation in OT services during Hospitalization and at D/C    Pt and/or Family verbalized/demonstrated a Fair(+) understanding of education provided. Will Review PRN. Rehab Potential: Good(-) for established goals     Patient / Family Goal: Return home \"to take care of my animals\"        Patient and/or family were instructed on functional diagnosis, prognosis/goals and OT plan of care. Demonstrated Fair(+) understanding.      Eval Complexity: Low    Time In: 1601  Time Out: 1630  Total Treatment Time: 14 minutes    Min Units   OT Eval Low 97165  X  1   OT Eval Medium 18153      OT Eval High 67542      OT Re-Eval [id number]       Therapeutic Ex 51445       Therapeutic Activities 86260       ADL/Self Care 16883  14  1   Orthotic

## 2023-08-21 NOTE — DISCHARGE SUMMARY
Hospitalist Discharge Summary    Patient ID: Carey Ferguson   Patient : 1955  Patient's PCP: Luis Colvin MD    Admit Date: 2023   Admitting Physician: Kota Nash MD    Discharge Date:  2023  Discharge Physician: Kota Nash MD   Discharge Condition: Stable  Discharge Disposition: Home    History of presenting illness:  59-year-old lady hypertension, dementia, trigeminal neuralgia, GERD status post right total hip arthroplasty on 2023 presented with new onset tonic-clonic seizures on 2023. Seizure was witnessed prior to her lasted 10 minutes with tonic-clonic seizure. She received 4 mg of Ativan and 1500 mg of IV Keppra. She was postictal given that she was transferred to the ICU at [facility] where she had additional seizures status post  additional ativan and phos phenytoin load. She was intubated for airway protection. Sodium was also low at 128 Given this she was transferred here to [facility] to the ICU. She was extubated and transitioned to oral keppra. CT head was negative or any acute findings. Case workers to help set up home PT/OT.     Hospital course in brief:  (Please refer to daily progress notes for a comprehensive review of the hospitalization by requesting medical records)  As above    Consults:   IP CONSULT TO NEUROLOGY  IP CONSULT TO DIETITIAN    Discharge Diagnoses:  Acute respite failure secondary to inability protect her airway  Status epilepticus   Status post right arthroplasty on 2023  Delirium- improved  Chronic Hyponatremia  Trigeminal neuralgia on Trileptal gabapentin and baclofen    Discharge Instructions / Follow up:    Continued appropriate risk factor modification of blood pressure, diabetes and serum lipids will remain essential to reducing risk of future atherosclerotic development    Activity: activity as tolerated    Significant labs:  CBC:   Recent Labs     23  0415 23  0422 23  0424   WBC 7.8 7.0 6.2 ABDOMEN 8/16/2023 12:57 pm COMPARISON: None. HISTORY: ORDERING SYSTEM PROVIDED HISTORY: Confirmation of course of NG/OG/NE tube and location of tip of tube TECHNOLOGIST PROVIDED HISTORY: Reason for exam:->Confirmation of course of NG/OG/NE tube and location of tip of tube Portable? ->Yes FINDINGS: Nonspecific bowel gas pattern without evidence of obstruction. No abnormal calcifications. No acute osseous abnormality. Enteric tube tip in the body of the stomach. Enteric tube tip in the body of the stomach. XR HIP 2-3 VW W PELVIS RIGHT    Result Date: 8/14/2023  EXAMINATION: ONE XRAY VIEW OF THE PELVIS AND TWO XRAY VIEWS RIGHT HIP 8/14/2023 11:53 am COMPARISON: 02/12/2023 abdomen/pelvis CT. HISTORY: ORDERING SYSTEM PROVIDED HISTORY: post op TECHNOLOGIST PROVIDED HISTORY: Of operative side while in recovery room Reason for exam:->post op FINDINGS: The patient is status post recent right hip arthroplasty. The metallic acetabular and femoral prosthetic components appear well-positioned. No fracture or dislocation. Osseous mineralization is normal.  There is moderate lower lumbar spondylosis and marked disc disease at L4-5 and L5-S1. There are scattered gas lucencies within the soft tissues around the right hip consistent with recent surgery. Postoperative changes consistent with recent right hip arthroplasty.        Discharge Medications:      Medication List        START taking these medications      levETIRAcetam 1000 MG tablet  Commonly known as: KEPPRA  Take 1 tablet by mouth 2 times daily            CONTINUE taking these medications      amLODIPine 10 MG tablet  Commonly known as: NORVASC  Take 1 tablet by mouth daily     aspirin 81 MG chewable tablet  Commonly known as: Aspirin Childrens  Take 1 tablet by mouth 2 times daily for 28 days     baclofen 10 MG tablet  Commonly known as: LIORESAL  Take 1 tablet by mouth 3 times daily as needed (muscle spasm)     dicyclomine 20 MG tablet  Commonly known as: BENTYL     gabapentin 800 MG tablet  Commonly known as: NEURONTIN  Take 1 tablet by mouth 3 times daily for 180 days. lisinopril 40 MG tablet  Commonly known as: PRINIVIL;ZESTRIL     meclizine 12.5 MG tablet  Commonly known as: ANTIVERT  Take 1 tablet by mouth 3 times daily as needed for Dizziness     menthol-methyl salicylate 11-77 % external cream  Apply topically 4 times daily as needed for Pain     OXcarbazepine 600 MG tablet  Commonly known as: TRILEPTAL  Take 1 tablet by mouth 3 times daily     oxyCODONE 5 MG immediate release tablet  Commonly known as: Roxicodone  Take 1 tablet by mouth every 6 hours as needed for Pain for up to 7 days. Intended supply: 7 days. Take lowest dose possible to manage pain Max Daily Amount: 20 mg     pantoprazole 40 MG tablet  Commonly known as: PROTONIX  Take 1 tablet by mouth every morning     polyethylene glycol 17 GM/SCOOP powder  Commonly known as: GLYCOLAX     traZODone 50 MG tablet  Commonly known as: DESYREL  Take 1 tablet by mouth nightly as needed for Sleep            STOP taking these medications      meloxicam 15 MG tablet  Commonly known as: MOBIC               Where to Get Your Medications        These medications were sent to [address] [place] [address] Court, [address],  [address]      Phone: [phone]   levETIRAcetam 1000 MG tablet         Time Spent on discharge is more than 35 minutes in the examination, evaluation, counseling and review of medications and discharge plan.    +++++++++++++++++++++++++++++++++++++++++++++++++  Anisa Martinez MD  [place], [place]  +++++++++++++++++++++++++++++++++++++++++++++++++  NOTE: This report was transcribed using voice recognition software. Every effort was made to ensure accuracy; however, inadvertent computerized transcription errors may be present.

## 2023-08-21 NOTE — PROGRESS NOTES
Perfect serve message sent to Dr. Brooklynn Vasquez to inquire if patient can be discharged tonight. Awaiting response.

## 2023-08-21 NOTE — PLAN OF CARE
Problem: Discharge Planning  Goal: Discharge to home or other facility with appropriate resources  8/21/2023 1947 by Vincenzo Posada RN  Outcome: Completed  8/21/2023 1522 by Vincenzo Posada RN  Outcome: Progressing     Problem: Safety - Adult  Goal: Free from fall injury  8/21/2023 1947 by Vincenzo Posada RN  Outcome: Completed  8/21/2023 1522 by Vincenzo Posada RN  Outcome: Progressing     Problem: Pain  Goal: Verbalizes/displays adequate comfort level or baseline comfort level  8/21/2023 1947 by Vincenzo Posada RN  Outcome: Completed  8/21/2023 1522 by Vincenzo Posada RN  Outcome: Progressing     Problem: Skin/Tissue Integrity  Goal: Absence of new skin breakdown  Description: 1. Monitor for areas of redness and/or skin breakdown  2. Assess vascular access sites hourly  3. Every 4-6 hours minimum:  Change oxygen saturation probe site  4. Every 4-6 hours:  If on nasal continuous positive airway pressure, respiratory therapy assess nares and determine need for appliance change or resting period. 8/21/2023 1947 by Vincenzo Posada RN  Outcome: Completed  8/21/2023 1522 by Vincenzo Posada RN  Outcome: Progressing     Problem: ABCDS Injury Assessment  Goal: Absence of physical injury  8/21/2023 1947 by Vincenzo Posada RN  Outcome: Completed  8/21/2023 1522 by Vincenzo Posada RN  Outcome: Progressing     Problem: Safety - Medical Restraint  Goal: Remains free of injury from restraints (Restraint for Interference with Medical Device)  Description: INTERVENTIONS:  1. Determine that other, less restrictive measures have been tried or would not be effective before applying the restraint  2. Evaluate the patient's condition at the time of restraint application  3. Inform patient/family regarding the reason for restraint  4.  Q2H: Monitor safety, psychosocial status, comfort, nutrition and hydration  8/21/2023 1947 by Reba Hampton RN  Outcome: Completed  8/21/2023 1522 by Reba Hampton RN  Outcome: Progressing     Problem: Nutrition Deficit:  Goal: Optimize nutritional status  8/21/2023 1947 by Reba Hampton RN  Outcome: Completed  8/21/2023 1522 by Reba Hampton RN  Outcome: Progressing     Problem: Neurosensory - Adult  Goal: Achieves stable or improved neurological status  8/21/2023 1947 by Reba Hampton RN  Outcome: Completed  8/21/2023 1522 by Reba Hampton RN  Outcome: Progressing  Goal: Absence of seizures  Outcome: Completed  Goal: Remains free of injury related to seizures activity  Outcome: Completed  Goal: Achieves maximal functionality and self care  Outcome: Completed

## 2023-08-21 NOTE — PROGRESS NOTES
Call received from Dr. Jong Rutledge requesting I reach out to ortho team for completion of discharge medication reconciliation orders in regards to pain medication orders and anticoagulation medications for discharge. Perfect serve message sent to ortho resident on call to notify of primary okay with d/c and requesting they place orders for d/c pain meds and anticoagulants. Ortho resident replied \"Will address when able\".

## 2023-08-21 NOTE — PLAN OF CARE
Problem: Discharge Planning  Goal: Discharge to home or other facility with appropriate resources  Outcome: Progressing     Problem: Safety - Adult  Goal: Free from fall injury  Outcome: Progressing     Problem: Pain  Goal: Verbalizes/displays adequate comfort level or baseline comfort level  Outcome: Progressing     Problem: Skin/Tissue Integrity  Goal: Absence of new skin breakdown  Description: 1. Monitor for areas of redness and/or skin breakdown  2. Assess vascular access sites hourly  3. Every 4-6 hours minimum:  Change oxygen saturation probe site  4. Every 4-6 hours:  If on nasal continuous positive airway pressure, respiratory therapy assess nares and determine need for appliance change or resting period. Outcome: Progressing     Problem: ABCDS Injury Assessment  Goal: Absence of physical injury  Outcome: Progressing     Problem: Safety - Medical Restraint  Goal: Remains free of injury from restraints (Restraint for Interference with Medical Device)  Description: INTERVENTIONS:  1. Determine that other, less restrictive measures have been tried or would not be effective before applying the restraint  2. Evaluate the patient's condition at the time of restraint application  3. Inform patient/family regarding the reason for restraint  4.  Q2H: Monitor safety, psychosocial status, comfort, nutrition and hydration  Outcome: Progressing     Problem: Nutrition Deficit:  Goal: Optimize nutritional status  Outcome: Progressing     Problem: Neurosensory - Adult  Goal: Achieves stable or improved neurological status  Outcome: Progressing

## 2023-08-22 ENCOUNTER — TELEPHONE (OUTPATIENT)
Dept: PRIMARY CARE CLINIC | Age: 68
End: 2023-08-22

## 2023-08-22 DIAGNOSIS — Z65.8 LACK OF SOCIAL SUPPORT: ICD-10-CM

## 2023-08-22 DIAGNOSIS — Z59.82 LACK OF ACCESS TO TRANSPORTATION: ICD-10-CM

## 2023-08-22 DIAGNOSIS — G40.909 SEIZURE DISORDER (HCC): ICD-10-CM

## 2023-08-22 DIAGNOSIS — Z96.641 S/P TOTAL RIGHT HIP ARTHROPLASTY: Primary | ICD-10-CM

## 2023-08-22 DIAGNOSIS — Z96.641 S/P HIP REPLACEMENT, RIGHT: Primary | ICD-10-CM

## 2023-08-22 DIAGNOSIS — Z74.2 HOME HELP NEEDED: ICD-10-CM

## 2023-08-22 LAB
ALBUMIN SERPL-MCNC: NORMAL G/DL
ALP BLD-CCNC: NORMAL U/L
ALT SERPL-CCNC: NORMAL U/L
ANION GAP SERPL CALCULATED.3IONS-SCNC: NORMAL MMOL/L
AST SERPL-CCNC: NORMAL U/L
BASOPHILS ABSOLUTE: NORMAL
BASOPHILS RELATIVE PERCENT: NORMAL
BILIRUB SERPL-MCNC: NORMAL MG/DL
BUN BLDV-MCNC: NORMAL MG/DL
CALCIUM SERPL-MCNC: NORMAL MG/DL
CHLORIDE BLD-SCNC: NORMAL MMOL/L
CO2: NORMAL
CREAT SERPL-MCNC: NORMAL MG/DL
EGFR: NORMAL
EOSINOPHILS ABSOLUTE: NORMAL
EOSINOPHILS RELATIVE PERCENT: NORMAL
GLUCOSE BLD-MCNC: NORMAL MG/DL
HCT VFR BLD CALC: NORMAL %
HEMOGLOBIN: NORMAL
LYMPHOCYTES ABSOLUTE: NORMAL
LYMPHOCYTES RELATIVE PERCENT: NORMAL
MCH RBC QN AUTO: NORMAL PG
MCHC RBC AUTO-ENTMCNC: NORMAL G/DL
MCV RBC AUTO: NORMAL FL
MICROORGANISM SPEC CULT: NO GROWTH
MONOCYTES ABSOLUTE: NORMAL
MONOCYTES RELATIVE PERCENT: NORMAL
NEUTROPHILS ABSOLUTE: NORMAL
NEUTROPHILS RELATIVE PERCENT: NORMAL
PDW BLD-RTO: NORMAL %
PLATELET # BLD: NORMAL 10*3/UL
PMV BLD AUTO: NORMAL FL
POTASSIUM SERPL-SCNC: NORMAL MMOL/L
RBC # BLD: NORMAL 10*6/UL
SODIUM BLD-SCNC: NORMAL MMOL/L
SPECIMEN DESCRIPTION: NORMAL
TOTAL PROTEIN: NORMAL
WBC # BLD: NORMAL 10*3/UL

## 2023-08-22 RX ORDER — ASPIRIN 81 MG/1
81 TABLET ORAL 2 TIMES DAILY
Qty: 56 TABLET | Refills: 0 | Status: SHIPPED | OUTPATIENT
Start: 2023-08-22 | End: 2023-09-19

## 2023-08-22 RX ORDER — OXYCODONE HYDROCHLORIDE AND ACETAMINOPHEN 5; 325 MG/1; MG/1
1 TABLET ORAL EVERY 6 HOURS PRN
Qty: 28 TABLET | Refills: 0 | Status: SHIPPED | OUTPATIENT
Start: 2023-08-22 | End: 2023-08-29

## 2023-08-22 SDOH — ECONOMIC STABILITY - TRANSPORTATION SECURITY: TRANSPORTATION INSECURITY: Z59.82
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
## 2023-08-22 NOTE — TELEPHONE ENCOUNTER
Patient returned call - called requesting home delivery for medication, that she is unable to  medication and her  is not willing to pick it up for her since unable to drive. She states  told her that he is \"done taking care of her, that he had to deal with her being in a hospital for 1 week\" , \"that he  her to take care of him and not him take care of her\". She states he was supposed to be home this week with her since d/c from hospital yesterday but woke up this morning and he was getting dressed for work, \"told her to figure it out\". She states that she is at a loss and should've never proceeded with surgery but hip was so painful. When asked why she didn't go to a rehabilitation facility she states  told her she had to be home to take care of their pets (dog , cats) and would help her thru the post operative period but refusing to do so. When asked about other family, friends, neighbors - she states that she has no one to go to or help her. She has a son listed as EC, she states that he works midnights and is legally blind so unable to help her. Called PCP to request  consult and assist with transportation needs. Spoke to Raven, she will send message to provider requesting  consult and states [facility] typically assists with transportation needs. Will speak to providers when they come into clinic concerning r/s post op appointment that was last week and unable to make due to being admitted.

## 2023-08-22 NOTE — TELEPHONE ENCOUNTER
Patient had surgery, 8/14/2023 - Rt MICKEY - was transferred to [facility] after post op complications.     Patient called office this morning, discharged from hospital and requesting medications to be set to local pharmacy ([address], [address] [place] [address],[phone])    Patient was notified medication will be signed and sent to above pharmacy

## 2023-08-22 NOTE — PROGRESS NOTES
CLINICAL PHARMACY NOTE: MEDS TO BEDS    Total # of Prescriptions Filled: 2   The following medications were delivered to the patient:  Asp 81  Oxy 5    Additional Documentation:

## 2023-08-22 NOTE — TELEPHONE ENCOUNTER
Called pharmacy to see if they have delivery services -  home and door dash is avail for medication. Instructed to have patient call pharmacy and set up the delivery. Called patient to let her know to call pharmacy and set up home delivery. Patient sounded very winded and upset, she states that she just came from basement and almost fell. She states that she has to go up / down the steps to feed the cat(s). She has been trying to reach  but his phone is off. Dogs started to bark and said someone was pulling in and had to go. Patient hung up.

## 2023-08-22 NOTE — CARE COORDINATION
[facility] notified of discharge yesterday evening and homecare orders. They will follow up with patient at home. For questions I can be reached at [phone].  Isma Alvarez

## 2023-08-23 ENCOUNTER — TELEPHONE (OUTPATIENT)
Dept: PRIMARY CARE CLINIC | Age: 68
End: 2023-08-23

## 2023-08-23 ENCOUNTER — TELEPHONE (OUTPATIENT)
Dept: FAMILY MEDICINE CLINIC | Age: 68
End: 2023-08-23

## 2023-08-23 ENCOUNTER — TELEPHONE (OUTPATIENT)
Dept: ORTHOPEDIC SURGERY | Age: 68
End: 2023-08-23

## 2023-08-23 NOTE — TELEPHONE ENCOUNTER
Called and spoke to  Hannah Maldonado today regarding Melly's well-being. Provider and staff concerned for her safety at home. Lian Sanford advised us to contact the [facility] police to do a welfare check and she will place a call to adult protective services as well.  Adrianna Salter will place a call to the [facility] and ask for a welfare check on the patient. -Giselle Dixon

## 2023-08-23 NOTE — TELEPHONE ENCOUNTER
Advised by case management and , placed call to [facility] GEORGE, spoke with dispatch to do a welfare check --- patient name was given and dispatch stated that their officers actually just left her house, someone already placed a welfare check for her. Patient was stable and declined all medical attention offered to her. Told DORIS we would document.
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
## 2023-08-23 NOTE — TELEPHONE ENCOUNTER
Called and left her another message. Told her she can always ask ECC to connect her to [facility] office and they will call here. Also just needing to know if she has [address] yet?   If she calls back and is put through to you guys again

## 2023-08-23 NOTE — TELEPHONE ENCOUNTER
Called left another msg for pt since she called back and they connected her to [facility] and was disconnected

## 2023-08-23 NOTE — TELEPHONE ENCOUNTER
----- Message from [facility] sent at 8/23/2023  1:21 PM EDT -----  Subject: Message to Provider    QUESTIONS  Information for Provider? Had a call from a patient that was returning a   call and didnt know who it was but she disconnected and I have not been   able to get through. IT looks like Carol tried to call her today. She   seems frustrated. ---------------------------------------------------------------------------  --------------  Jessica MATHEW  [phone]; Do not leave any message, patient will call back for answer  ---------------------------------------------------------------------------  --------------  SCRIPT ANSWERS  Relationship to Patient?  Self
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
## 2023-08-24 NOTE — TELEPHONE ENCOUNTER
Pt called in and is scheduled for an appointment with Dr Sidney Mason on Monday at 8:30 am.  Not able to keep anything down and has not been sleeping  She does not have home health. She keeps getting placed on hold and then call disconnects. She states they haven't or wont call her back. She is very frustrated. She states she will be here Monday for appointment. She cant get medications and cant get any help. She stated she called insurance to see about getting transportation and they will not come back to end of her driveway to get her and she cant walk to where they need her to walk to get picked up. Has anything come up with welfare check?

## 2023-08-24 NOTE — TELEPHONE ENCOUNTER
See social work phone note. She is supposed to be seen by [facility]. Please call them.

## 2023-08-24 NOTE — TELEPHONE ENCOUNTER
Pt scheduled for Monday for hospital follow up. Not able to keep anything down and has not been sleeping  She does not have home health. She keeps getting placed on hold and then call disconnects. She states they haven't or wont call her back. She is very frustrated. She states she will be here Monday for appointment. She cant get medications and cant get any help. She stated she called insurance to see about getting transportation and they will not come back to end of her driveway to get her and she cant walk to where they need her to walk to get picked up.   Pt states she has been in and out of ED since discharge from [address] [place] [address] Initial Follow Up Call    Call within 2 business days of discharge: No     Patient: Zahira Merida Patient : 1955 MRN: [id number]    [unfilled]    RARS: Readmission Risk Score: 19.3       Spoke with: Zaki Friday    Discharge department/facility: hospital    Non-face-to-face services provided:  Scheduled appointment with PCP-Monday at 8:30 am    Follow Up  Future Appointments   Date Time Provider [address]   2023  8:30 AM MD Jessy Montaño [facility]   10/9/2023  2:00 PM Laura Mccauley MD [facility]

## 2023-08-28 ENCOUNTER — OFFICE VISIT (OUTPATIENT)
Dept: PRIMARY CARE CLINIC | Age: 68
End: 2023-08-28

## 2023-08-28 VITALS
BODY MASS INDEX: 23.39 KG/M2 | OXYGEN SATURATION: 97 % | DIASTOLIC BLOOD PRESSURE: 78 MMHG | TEMPERATURE: 98.3 F | WEIGHT: 132 LBS | HEART RATE: 100 BPM | SYSTOLIC BLOOD PRESSURE: 138 MMHG | RESPIRATION RATE: 20 BRPM | HEIGHT: 63 IN

## 2023-08-28 DIAGNOSIS — E87.1 HYPONATREMIA: ICD-10-CM

## 2023-08-28 DIAGNOSIS — Z91.81 AT HIGH RISK FOR FALLS: ICD-10-CM

## 2023-08-28 DIAGNOSIS — R11.0 NAUSEA: ICD-10-CM

## 2023-08-28 DIAGNOSIS — Z09 HOSPITAL DISCHARGE FOLLOW-UP: Primary | ICD-10-CM

## 2023-08-28 DIAGNOSIS — D64.9 POSTOPERATIVE ANEMIA: ICD-10-CM

## 2023-08-28 DIAGNOSIS — Z96.641 S/P TOTAL RIGHT HIP ARTHROPLASTY: ICD-10-CM

## 2023-08-28 DIAGNOSIS — R29.90 NEW ONSET SEIZURE WITH ABNORMAL NEUROLOGICAL EXAM WITHOUT HEAD TRAUMA (HCC): ICD-10-CM

## 2023-08-28 DIAGNOSIS — R56.9 NEW ONSET SEIZURE WITH ABNORMAL NEUROLOGICAL EXAM WITHOUT HEAD TRAUMA (HCC): ICD-10-CM

## 2023-08-28 RX ORDER — MELOXICAM 15 MG/1
15 TABLET ORAL DAILY
COMMUNITY

## 2023-08-28 RX ORDER — ONDANSETRON 4 MG/1
4 TABLET, ORALLY DISINTEGRATING ORAL 3 TIMES DAILY PRN
Qty: 90 TABLET | Refills: 0 | Status: SHIPPED | OUTPATIENT
Start: 2023-08-28

## 2023-08-28 NOTE — PROGRESS NOTES
Post-Discharge Transitional Care  Follow Up      Zay Bell   YOB: 1955    Date of Office Visit:  8/28/2023  Date of Hospital Admission: 8/16/23  Date of Hospital Discharge: 8/21/23  Risk of hospital readmission (high >=14%. Medium >=10%) :Readmission Risk Score: 19.3      Care management risk score Rising risk (score 2-5) and Complex Care (Scores >=6): No Risk Score On File     Non face to face  following discharge, date last encounter closed (first attempt may have been earlier): 08/22/2023    Call initiated 2 business days of discharge: No    ASSESSMENT/PLAN:   Hospital discharge follow-up  -     MT DISCHARGE MEDS RECONCILED W/ CURRENT OUTPATIENT MED LIST  New onset seizure with abnormal neurological exam without head trauma ([address])  S/P total right hip arthroplasty  Nausea  -     Comprehensive Metabolic Panel; Future  -     CBC; Future  -     ondansetron (ZOFRAN-ODT) 4 MG disintegrating tablet; Take 1 tablet by mouth 3 times daily as needed for Nausea or Vomiting, Disp-90 tablet, R-0Normal  Hyponatremia  -     Comprehensive Metabolic Panel; Future  Postoperative anemia  -     CBC; Future  At high risk for falls    Patient with new-onset witnessed seizure in hospital s/p right TKA. Now with new history from , possible seizure in July 2023, witnessed syncope then shaking therefore may had been related to syncope rather than seizure. Hospital workup notable for negative head CT and hyponatremia. Was started on Keppra and discharged to follow-up with neurology. Labs notable for hyponatremia and anemia. Unclear why patient restarted lisinopril. Had stopped this for hyponatremia, changed to hydralazine. Patient may have been taking at home as this was reported during her medication reconciliation for hospitalization. Repeat sodium in hospital had improved on lisinopril and blood pressure controlled today. Will recheck labs prior to stopping.  Continue lisinopril 40 mg daily, amlodipine 10

## 2023-08-30 ENCOUNTER — OFFICE VISIT (OUTPATIENT)
Dept: ORTHOPEDIC SURGERY | Age: 68
End: 2023-08-30

## 2023-08-30 VITALS — HEIGHT: 63 IN | WEIGHT: 132 LBS | BODY MASS INDEX: 23.39 KG/M2

## 2023-08-30 DIAGNOSIS — Z96.641 S/P HIP REPLACEMENT, RIGHT: Primary | ICD-10-CM

## 2023-08-30 PROCEDURE — 99024 POSTOP FOLLOW-UP VISIT: CPT | Performed by: ORTHOPAEDIC SURGERY

## 2023-08-30 ASSESSMENT — ENCOUNTER SYMPTOMS
VOMITING: 1
SHORTNESS OF BREATH: 0
DIARRHEA: 0
NAUSEA: 1
CONSTIPATION: 0

## 2023-08-30 NOTE — PROGRESS NOTES
[facility]   ORTHOPAEDIC SURGERY AND SPORTS MEDICINE  DATE OF VISIT: 08/30/23  Follow Up Post Operative Visit     CHIEF COMPLAINT:   Chief Complaint   Patient presents with    Follow Up After Procedure     8/14/2023 - Right total hip replacement     Post-Op Check     Rt hip s/p 16 days    Suture / Staple Removal     Rt hip    Wound Check     Rt hip       Surgery: Right total hip replacement   Date: 08/14/2023    Subjective:    Tyshawn Bhatti is here for follow up visit s/p above procedure. She is doing well despite immediate postop seizures requiring transfer and intubation. She has been doing okay at home getting around. Still having some pain. Controlled Substances Monitoring:        Physical Exam:    No data recorded    General: Alert and oriented x3, no acute distress  Cardiovascular/pulmonary: No labored breathing, peripheral perfusion intact  Musculoskeletal:    Exam of the hip shows intact incision. Leg lengths near equivalent. Hip range of motion moderate arcs tolerable with minimal pain      Imaging: X-rays show well aligned total hip arthroplasty    Assessment and Plan: Status post right total hip arthroplasty  She is doing well despite postop complication of seizure. This required transfer downtown. She was then apparently admitted also at [facility] for a seizure. She has been doing okay at home. She will continue with home PT and transition to outpatient. We will see her back in 6 weeks.     Mandie Worthy MD  Orthopaedic Surgery   8/30/23  11:27 AM

## 2023-09-01 ENCOUNTER — TELEPHONE (OUTPATIENT)
Dept: INTERNAL MEDICINE | Age: 68
End: 2023-09-01

## 2023-09-01 NOTE — TELEPHONE ENCOUNTER
Late Entry:    Covering LSW left message at [address] for status update on 8.31.23;  noted that pt did keep PCP appt on 8.38.23 and ortho appt on 8.30.23.

## 2023-09-11 ENCOUNTER — TELEPHONE (OUTPATIENT)
Dept: PRIMARY CARE CLINIC | Age: 68
End: 2023-09-11

## 2023-09-11 NOTE — TELEPHONE ENCOUNTER
Patient needs to be evaluated and possible GI referral. I referred her previously but does not appear she saw them. This does not sound consistent with IBS. Recommend walk-in clinic.

## 2023-09-11 NOTE — TELEPHONE ENCOUNTER
Patient thinks that she is having an IBS flare up. She is not on any antibiotics at this time and states that she has had this before. She said she can not even get up off of the couch without having 'discharge'. She describes it as 'pooping jelly'. She states that it is very embarrassing and she can't do anything bc of this.

## 2023-09-12 DIAGNOSIS — Z96.641 S/P HIP REPLACEMENT, RIGHT: ICD-10-CM

## 2023-09-12 RX ORDER — OXYCODONE HYDROCHLORIDE AND ACETAMINOPHEN 5; 325 MG/1; MG/1
1 TABLET ORAL EVERY 6 HOURS PRN
Qty: 28 TABLET | Refills: 0 | Status: SHIPPED | OUTPATIENT
Start: 2023-09-12 | End: 2023-09-19

## 2023-09-12 NOTE — PROGRESS NOTES
Physician Progress Note      Yumiko Robert  [facility] #:                  [id number]  :                       1955  ADMIT DATE:       2023 7:56 PM  [facility] DATE:        2023 8:55 PM  RESPONDING  PROVIDER #:        Saint Mcgill MD          QUERY TEXT:    Pt admitted with seizure. Pt noted to have chronic hyponatremia and had   stopped gabapentin, baclofen and Trileptal prior to surgery. If possible,   please document in progress notes and discharge summary after study the   etiology of the seizure: The medical record reflects the following:  Risk Factors: Medication stopped for surgery to include baclofen,   oxcarbazepine and gabapentin. Clinical Indicators:  23: Per Critical care consult: New onset seizure activity, status   epilepticus 2/2 abrupt discontinuation of Baclofen VS meningitis VS CVA VS   idiopathic  23: Per critical care: New onset with status epilepticus 2/2 new   medication withdrawal VS hyponatremia VS cerebral lesion. Patient was on   gabapentin, oxcarbazepine, and baclofen outpatient but were all stopped during   this admission prior to surgery. 23: Sodium 128  23: sodium 128  23: Sodium 130  Treatment: Neurology consult; EEG; Keppra IV; Gabapentin PO; Trileptal PO;   Baclofen PO; Critical care consult; Thank you,  Mickey Vaca BSN, R.N.  Clinical Documentation Improvement  [phone]  Options provided:  -- Seizure due to Baclofen withdrawal  -- Seizure due to brain lesion  -- Seizure due to Hyponatremia  -- Seizure due to other, please specify cause. -- Other - I will add my own diagnosis  -- Disagree - Not applicable / Not valid  -- Disagree - Clinically unable to determine / Unknown  -- Refer to Clinical Documentation Reviewer    PROVIDER RESPONSE TEXT:    Seizure is due to Hyponatremia. Query created by: Mickey Vaca on 2023 11:04 AM      QUERY TEXT:    Pt admitted with Seizures.  Pt noted to have Chronic

## 2023-09-12 NOTE — TELEPHONE ENCOUNTER
Pt phoned in requesting refill,     Surgery: Right total hip replacement   Date: 08/14/2023        4 weeks out

## 2023-09-13 ENCOUNTER — OFFICE VISIT (OUTPATIENT)
Dept: FAMILY MEDICINE CLINIC | Age: 68
End: 2023-09-13
Payer: MEDICARE

## 2023-09-13 VITALS
HEIGHT: 63 IN | HEART RATE: 104 BPM | TEMPERATURE: 97.9 F | BODY MASS INDEX: 23.39 KG/M2 | OXYGEN SATURATION: 97 % | SYSTOLIC BLOOD PRESSURE: 120 MMHG | RESPIRATION RATE: 20 BRPM | DIASTOLIC BLOOD PRESSURE: 78 MMHG | WEIGHT: 132 LBS

## 2023-09-13 DIAGNOSIS — Z48.89 ENCOUNTER FOR POSTOPERATIVE WOUND CHECK: Primary | ICD-10-CM

## 2023-09-13 PROCEDURE — 3017F COLORECTAL CA SCREEN DOC REV: CPT | Performed by: PHYSICIAN ASSISTANT

## 2023-09-13 PROCEDURE — 3074F SYST BP LT 130 MM HG: CPT | Performed by: PHYSICIAN ASSISTANT

## 2023-09-13 PROCEDURE — 3078F DIAST BP <80 MM HG: CPT | Performed by: PHYSICIAN ASSISTANT

## 2023-09-13 PROCEDURE — 99213 OFFICE O/P EST LOW 20 MIN: CPT | Performed by: PHYSICIAN ASSISTANT

## 2023-09-13 PROCEDURE — 1090F PRES/ABSN URINE INCON ASSESS: CPT | Performed by: PHYSICIAN ASSISTANT

## 2023-09-13 PROCEDURE — 1036F TOBACCO NON-USER: CPT | Performed by: PHYSICIAN ASSISTANT

## 2023-09-13 PROCEDURE — G8400 PT W/DXA NO RESULTS DOC: HCPCS | Performed by: PHYSICIAN ASSISTANT

## 2023-09-13 PROCEDURE — 1111F DSCHRG MED/CURRENT MED MERGE: CPT | Performed by: PHYSICIAN ASSISTANT

## 2023-09-13 PROCEDURE — G8420 CALC BMI NORM PARAMETERS: HCPCS | Performed by: PHYSICIAN ASSISTANT

## 2023-09-13 PROCEDURE — G8427 DOCREV CUR MEDS BY ELIG CLIN: HCPCS | Performed by: PHYSICIAN ASSISTANT

## 2023-09-13 PROCEDURE — 1124F ACP DISCUSS-NO DSCNMKR DOCD: CPT | Performed by: PHYSICIAN ASSISTANT

## 2023-09-13 ASSESSMENT — ENCOUNTER SYMPTOMS
PHOTOPHOBIA: 0
DIARRHEA: 0
BACK PAIN: 0
VOMITING: 0
ABDOMINAL PAIN: 0
SHORTNESS OF BREATH: 0
SORE THROAT: 0
COUGH: 0
NAUSEA: 0

## 2023-09-16 DIAGNOSIS — I10 PRIMARY HYPERTENSION: ICD-10-CM

## 2023-09-18 ENCOUNTER — TELEPHONE (OUTPATIENT)
Dept: PRIMARY CARE CLINIC | Age: 68
End: 2023-09-18

## 2023-09-18 DIAGNOSIS — I10 PRIMARY HYPERTENSION: ICD-10-CM

## 2023-09-18 RX ORDER — HYDRALAZINE HYDROCHLORIDE 10 MG/1
10 TABLET, FILM COATED ORAL 3 TIMES DAILY
Qty: 90 TABLET | Refills: 2 | Status: SHIPPED
Start: 2023-09-18 | End: 2023-09-19

## 2023-09-18 NOTE — TELEPHONE ENCOUNTER
Patient is trying to schedule an ER follow up. She was in the emergency room on Friday. I am not sure who is covering for her, but she will need called back.

## 2023-09-18 NOTE — TELEPHONE ENCOUNTER
Last Appointment:  8/28/2023  Future Appointments   Date Time Provider [address]   10/9/2023  2:00 PM Drea Lozoya MD [facility]   10/9/2023  4:30 PM MD Isma Awanony   10/12/2023  1:20 PM NABOR Christina - CNP [facility]

## 2023-09-19 RX ORDER — HYDRALAZINE HYDROCHLORIDE 10 MG/1
10 TABLET, FILM COATED ORAL 3 TIMES DAILY
Qty: 270 TABLET | Refills: 1 | Status: SHIPPED | OUTPATIENT
Start: 2023-09-19

## 2023-09-19 NOTE — TELEPHONE ENCOUNTER
Last Appointment:  4/24/2023  Future Appointments   Date Time Provider [address]   10/9/2023  2:00 PM Laura Mccauley MD [facility]   10/9/2023  4:30 PM Laura Mccauley MD [facility]   10/12/2023  1:20 PM Gracie Tapia, APRN - CNP [facility]

## 2023-09-28 RX ORDER — PANTOPRAZOLE SODIUM 40 MG/1
40 TABLET, DELAYED RELEASE ORAL EVERY MORNING
Qty: 90 TABLET | Refills: 1 | Status: SHIPPED | OUTPATIENT
Start: 2023-09-28

## 2023-09-28 NOTE — TELEPHONE ENCOUNTER
Last Appointment:  8/28/2023  Future Appointments   Date Time Provider [address]   10/9/2023  2:00 PM Yaritza Boland MD [facility]   10/9/2023  4:30 PM Yaritza Boland MD [facility]   10/12/2023  1:20 PM NABOR Muniz - YOU [facility]

## 2023-10-05 DIAGNOSIS — I10 PRIMARY HYPERTENSION: ICD-10-CM

## 2023-10-05 RX ORDER — METOPROLOL SUCCINATE 25 MG/1
25 TABLET, EXTENDED RELEASE ORAL DAILY
Qty: 90 TABLET | Refills: 1 | OUTPATIENT
Start: 2023-10-05

## 2023-10-09 ENCOUNTER — OFFICE VISIT (OUTPATIENT)
Dept: PRIMARY CARE CLINIC | Age: 68
End: 2023-10-09
Payer: MEDICARE

## 2023-10-09 VITALS
HEART RATE: 96 BPM | DIASTOLIC BLOOD PRESSURE: 70 MMHG | SYSTOLIC BLOOD PRESSURE: 136 MMHG | HEIGHT: 63 IN | TEMPERATURE: 98.5 F | WEIGHT: 135 LBS | OXYGEN SATURATION: 95 % | BODY MASS INDEX: 23.92 KG/M2 | RESPIRATION RATE: 20 BRPM

## 2023-10-09 VITALS
OXYGEN SATURATION: 95 % | DIASTOLIC BLOOD PRESSURE: 70 MMHG | HEART RATE: 96 BPM | TEMPERATURE: 98.5 F | RESPIRATION RATE: 20 BRPM | SYSTOLIC BLOOD PRESSURE: 136 MMHG | BODY MASS INDEX: 23.92 KG/M2 | HEIGHT: 63 IN | WEIGHT: 135 LBS

## 2023-10-09 DIAGNOSIS — Z96.641 S/P TOTAL RIGHT HIP ARTHROPLASTY: Primary | ICD-10-CM

## 2023-10-09 DIAGNOSIS — F51.04 PSYCHOPHYSIOLOGICAL INSOMNIA: ICD-10-CM

## 2023-10-09 DIAGNOSIS — E87.1 HYPONATREMIA: ICD-10-CM

## 2023-10-09 DIAGNOSIS — Z12.31 SCREENING MAMMOGRAM FOR BREAST CANCER: ICD-10-CM

## 2023-10-09 DIAGNOSIS — R13.10 DYSPHAGIA, UNSPECIFIED TYPE: ICD-10-CM

## 2023-10-09 DIAGNOSIS — Z13.820 SCREENING FOR OSTEOPOROSIS: ICD-10-CM

## 2023-10-09 DIAGNOSIS — I10 PRIMARY HYPERTENSION: ICD-10-CM

## 2023-10-09 DIAGNOSIS — Z78.0 ASYMPTOMATIC MENOPAUSAL STATE: ICD-10-CM

## 2023-10-09 DIAGNOSIS — Z12.12 SCREENING FOR COLORECTAL CANCER: ICD-10-CM

## 2023-10-09 DIAGNOSIS — Z12.11 SCREENING FOR COLORECTAL CANCER: ICD-10-CM

## 2023-10-09 DIAGNOSIS — K21.9 GASTROESOPHAGEAL REFLUX DISEASE, UNSPECIFIED WHETHER ESOPHAGITIS PRESENT: ICD-10-CM

## 2023-10-09 DIAGNOSIS — D64.9 POSTOPERATIVE ANEMIA: ICD-10-CM

## 2023-10-09 DIAGNOSIS — R11.0 NAUSEA: ICD-10-CM

## 2023-10-09 DIAGNOSIS — R29.90 NEW ONSET SEIZURE WITH ABNORMAL NEUROLOGICAL EXAM WITHOUT HEAD TRAUMA (HCC): ICD-10-CM

## 2023-10-09 DIAGNOSIS — K58.1 IRRITABLE BOWEL SYNDROME WITH CONSTIPATION: ICD-10-CM

## 2023-10-09 DIAGNOSIS — R56.9 NEW ONSET SEIZURE WITH ABNORMAL NEUROLOGICAL EXAM WITHOUT HEAD TRAUMA (HCC): ICD-10-CM

## 2023-10-09 DIAGNOSIS — Z71.89 COUNSELING REGARDING ADVANCED DIRECTIVES AND GOALS OF CARE: ICD-10-CM

## 2023-10-09 DIAGNOSIS — Z00.00 MEDICARE ANNUAL WELLNESS VISIT, SUBSEQUENT: Primary | ICD-10-CM

## 2023-10-09 LAB
ALBUMIN SERPL-MCNC: 4.5 G/DL (ref 3.5–5.2)
ALP BLD-CCNC: 90 U/L (ref 35–104)
ALT SERPL-CCNC: 7 U/L (ref 0–32)
ANION GAP SERPL CALCULATED.3IONS-SCNC: 12 MMOL/L (ref 7–16)
AST SERPL-CCNC: 21 U/L (ref 0–31)
BILIRUB SERPL-MCNC: 0.2 MG/DL (ref 0–1.2)
BUN BLDV-MCNC: 29 MG/DL (ref 6–23)
CALCIUM SERPL-MCNC: 9.5 MG/DL (ref 8.6–10.2)
CHLORIDE BLD-SCNC: 98 MMOL/L (ref 98–107)
CO2: 24 MMOL/L (ref 22–29)
CREAT SERPL-MCNC: 0.5 MG/DL (ref 0.5–1)
GFR SERPL CREATININE-BSD FRML MDRD: >60 ML/MIN/1.73M2
GLUCOSE BLD-MCNC: 96 MG/DL (ref 74–99)
HCT VFR BLD CALC: 36.6 % (ref 34–48)
HEMOGLOBIN: 11.7 G/DL (ref 11.5–15.5)
MCH RBC QN AUTO: 30.6 PG (ref 26–35)
MCHC RBC AUTO-ENTMCNC: 32 G/DL (ref 32–34.5)
MCV RBC AUTO: 95.8 FL (ref 80–99.9)
PDW BLD-RTO: 12.9 % (ref 11.5–15)
PLATELET # BLD: 364 K/UL (ref 130–450)
PMV BLD AUTO: 9.9 FL (ref 7–12)
POTASSIUM SERPL-SCNC: 4.1 MMOL/L (ref 3.5–5)
RBC # BLD: 3.82 M/UL (ref 3.5–5.5)
SODIUM BLD-SCNC: 134 MMOL/L (ref 132–146)
TOTAL PROTEIN: 7.3 G/DL (ref 6.4–8.3)
WBC # BLD: 7.8 K/UL (ref 4.5–11.5)

## 2023-10-09 PROCEDURE — 1090F PRES/ABSN URINE INCON ASSESS: CPT | Performed by: STUDENT IN AN ORGANIZED HEALTH CARE EDUCATION/TRAINING PROGRAM

## 2023-10-09 PROCEDURE — G8427 DOCREV CUR MEDS BY ELIG CLIN: HCPCS | Performed by: STUDENT IN AN ORGANIZED HEALTH CARE EDUCATION/TRAINING PROGRAM

## 2023-10-09 PROCEDURE — 3075F SYST BP GE 130 - 139MM HG: CPT | Performed by: STUDENT IN AN ORGANIZED HEALTH CARE EDUCATION/TRAINING PROGRAM

## 2023-10-09 PROCEDURE — 1124F ACP DISCUSS-NO DSCNMKR DOCD: CPT | Performed by: STUDENT IN AN ORGANIZED HEALTH CARE EDUCATION/TRAINING PROGRAM

## 2023-10-09 PROCEDURE — 3078F DIAST BP <80 MM HG: CPT | Performed by: STUDENT IN AN ORGANIZED HEALTH CARE EDUCATION/TRAINING PROGRAM

## 2023-10-09 PROCEDURE — 3017F COLORECTAL CA SCREEN DOC REV: CPT | Performed by: STUDENT IN AN ORGANIZED HEALTH CARE EDUCATION/TRAINING PROGRAM

## 2023-10-09 PROCEDURE — G0439 PPPS, SUBSEQ VISIT: HCPCS | Performed by: STUDENT IN AN ORGANIZED HEALTH CARE EDUCATION/TRAINING PROGRAM

## 2023-10-09 PROCEDURE — G8420 CALC BMI NORM PARAMETERS: HCPCS | Performed by: STUDENT IN AN ORGANIZED HEALTH CARE EDUCATION/TRAINING PROGRAM

## 2023-10-09 PROCEDURE — 3074F SYST BP LT 130 MM HG: CPT | Performed by: STUDENT IN AN ORGANIZED HEALTH CARE EDUCATION/TRAINING PROGRAM

## 2023-10-09 PROCEDURE — 99214 OFFICE O/P EST MOD 30 MIN: CPT | Performed by: STUDENT IN AN ORGANIZED HEALTH CARE EDUCATION/TRAINING PROGRAM

## 2023-10-09 PROCEDURE — G8484 FLU IMMUNIZE NO ADMIN: HCPCS | Performed by: STUDENT IN AN ORGANIZED HEALTH CARE EDUCATION/TRAINING PROGRAM

## 2023-10-09 PROCEDURE — 1036F TOBACCO NON-USER: CPT | Performed by: STUDENT IN AN ORGANIZED HEALTH CARE EDUCATION/TRAINING PROGRAM

## 2023-10-09 PROCEDURE — G8400 PT W/DXA NO RESULTS DOC: HCPCS | Performed by: STUDENT IN AN ORGANIZED HEALTH CARE EDUCATION/TRAINING PROGRAM

## 2023-10-09 RX ORDER — PANTOPRAZOLE SODIUM 40 MG/1
40 TABLET, DELAYED RELEASE ORAL EVERY MORNING
Qty: 90 TABLET | Refills: 1 | Status: SHIPPED | OUTPATIENT
Start: 2023-10-09

## 2023-10-09 RX ORDER — TRAZODONE HYDROCHLORIDE 50 MG/1
50 TABLET ORAL NIGHTLY PRN
Qty: 90 TABLET | Refills: 1 | Status: SHIPPED | OUTPATIENT
Start: 2023-10-09

## 2023-10-09 RX ORDER — MELOXICAM 15 MG/1
15 TABLET ORAL DAILY
Qty: 90 TABLET | Refills: 1 | Status: SHIPPED | OUTPATIENT
Start: 2023-10-09

## 2023-10-09 ASSESSMENT — PATIENT HEALTH QUESTIONNAIRE - PHQ9
3. TROUBLE FALLING OR STAYING ASLEEP: 3
SUM OF ALL RESPONSES TO PHQ QUESTIONS 1-9: 6
5. POOR APPETITE OR OVEREATING: 3
8. MOVING OR SPEAKING SO SLOWLY THAT OTHER PEOPLE COULD HAVE NOTICED. OR THE OPPOSITE, BEING SO FIGETY OR RESTLESS THAT YOU HAVE BEEN MOVING AROUND A LOT MORE THAN USUAL: 0
9. THOUGHTS THAT YOU WOULD BE BETTER OFF DEAD, OR OF HURTING YOURSELF: 0
SUM OF ALL RESPONSES TO PHQ9 QUESTIONS 1 & 2: 0
2. FEELING DOWN, DEPRESSED OR HOPELESS: 0
6. FEELING BAD ABOUT YOURSELF - OR THAT YOU ARE A FAILURE OR HAVE LET YOURSELF OR YOUR FAMILY DOWN: 0
4. FEELING TIRED OR HAVING LITTLE ENERGY: 0
1. LITTLE INTEREST OR PLEASURE IN DOING THINGS: 0
7. TROUBLE CONCENTRATING ON THINGS, SUCH AS READING THE NEWSPAPER OR WATCHING TELEVISION: 0
SUM OF ALL RESPONSES TO PHQ QUESTIONS 1-9: 6
SUM OF ALL RESPONSES TO PHQ QUESTIONS 1-9: 6
10. IF YOU CHECKED OFF ANY PROBLEMS, HOW DIFFICULT HAVE THESE PROBLEMS MADE IT FOR YOU TO DO YOUR WORK, TAKE CARE OF THINGS AT HOME, OR GET ALONG WITH OTHER PEOPLE: 0
SUM OF ALL RESPONSES TO PHQ QUESTIONS 1-9: 6

## 2023-10-09 NOTE — PROGRESS NOTES
ESTABLISHED PRIMARY CARE VISIT    10/9/23  Name: Heath    : 1955   Age: 79 y.o. Sex: female        Assessment & Plan:       ICD-10-CM    1. S/P total right hip arthroplasty  Z96.641 meloxicam (MOBIC) 15 MG tablet      2. Hyponatremia  E87.1 Comprehensive Metabolic Panel      3. Nausea  R11.0 Comprehensive Metabolic Panel     CBC      4. Postoperative anemia  D64.9 CBC      5. Primary hypertension  I10       6. New onset seizure with abnormal neurological exam without head trauma ([address])  R56.9     R29.90       7. Psychophysiological insomnia  F51.04 traZODone (DESYREL) 50 MG tablet      8. Irritable bowel syndrome with constipation  K58.1 [place], [place], Gastroenterology, Vernal Rinne      9. Gastroesophageal reflux disease, unspecified whether esophagitis present  K21.9 pantoprazole (PROTONIX) 40 MG tablet     [place], [place], Gastroenterology, Vernal Rinne      10. Dysphagia, unspecified type  R13.10 [address] [place] [address], [place], [place], Gastroenterology, Vernal Rinne      11. Screening for colorectal cancer  Z12.11 [address] [place] [address], [place], [place], Gastroenterology, Vernal Rinne    A58.05       26. Screening mammogram for breast cancer  Z12.31 [facility] NAEL DIGITAL SCREEN BILATERAL PER PROTOCOL      13. Screening for osteoporosis  Z13.820 DEXA BONE DENSITY AXIAL SKELETON      14. Asymptomatic menopausal state  Z78.0 DEXA BONE DENSITY AXIAL SKELETON        Patient is recovering much better from her MICKEY. Following with ortho and home health. Has not completed labs. Reordered. Hypertension is chronic and controlled. If hyponatremia resolved on current medications, will not make any adjustments. Stopped Keppra due to side effects. No recurrent seizures. Scheduled with neurology. Discussed risk of recurrent seizure while off medication. Health maintenance screenings ordered.     Counseled patient regarding above diagnosis, including possible risks and complications, especially if left

## 2023-10-12 ENCOUNTER — OFFICE VISIT (OUTPATIENT)
Dept: ORTHOPEDIC SURGERY | Age: 68
End: 2023-10-12

## 2023-10-12 DIAGNOSIS — Z96.641 S/P HIP REPLACEMENT, RIGHT: Primary | ICD-10-CM

## 2023-10-12 DIAGNOSIS — M25.551 RIGHT HIP PAIN: ICD-10-CM

## 2023-10-12 PROCEDURE — 99024 POSTOP FOLLOW-UP VISIT: CPT | Performed by: NURSE PRACTITIONER

## 2023-10-12 NOTE — PROGRESS NOTES
[facility]  ORTHOPAEDICS AND SPORTS MEDICINE  DATE OF VISIT: 10/12/23  Follow Up Post Operative Visit     CHIEF COMPLAINT:   Chief Complaint   Patient presents with    Follow Up After Procedure     6 week p/o R MICKEY 8/14/23 -- pt reports that she is improving in terms of her pain and ambulating. Has scar sensitivity, advised patient that she can begin to use vit E/cocoa butter. Surgery: Right total hip replacement   Date: 08/14/2023    Subjective:    Tima Vigil is here for follow up visit s/p above procedure. She is doing well. She has been compliant with postoperative care. She is set to complete physical therapy. States significant improvement in pain. Controlled Substances Monitoring:        Physical Exam:    No data recorded    General: Alert and oriented x3, no acute distress  Cardiovascular/pulmonary: No labored breathing, peripheral perfusion intact  Musculoskeletal:    Right hip exam incision site is healed mature scar present. Intact range of motion negative for pain, impingement, weakness. Gait stable. Neurovascular sensation grossly intact. Imaging: reviewed    Assessment and Plan:  Status post right total hip arthroplasty    Patient is about 2 months out from procedure listed above doing well. She has made excellent improvement with physical therapy. Exam displays excellent motion and strength of her right hip. She will continue with home exercises independently. Follow-up in 6 weeks for new imaging of her right hip with Dr. Dana Hannon.       NABOR Fernando-CNP  Orthopedic Surgery   10/12/23  1:50 PM

## 2023-10-16 ASSESSMENT — ENCOUNTER SYMPTOMS
SHORTNESS OF BREATH: 0
CONSTIPATION: 0
NAUSEA: 1
VOMITING: 0
DIARRHEA: 0

## 2023-11-04 DIAGNOSIS — I10 PRIMARY HYPERTENSION: ICD-10-CM

## 2023-11-07 DIAGNOSIS — Z12.31 SCREENING MAMMOGRAM FOR BREAST CANCER: ICD-10-CM

## 2023-11-07 DIAGNOSIS — Z78.0 ASYMPTOMATIC MENOPAUSAL STATE: ICD-10-CM

## 2023-11-07 DIAGNOSIS — Z13.820 SCREENING FOR OSTEOPOROSIS: ICD-10-CM

## 2023-11-08 RX ORDER — AMLODIPINE BESYLATE 10 MG/1
10 TABLET ORAL DAILY
Qty: 90 TABLET | Refills: 0 | Status: SHIPPED | OUTPATIENT
Start: 2023-11-08

## 2023-11-08 RX ORDER — LISINOPRIL 40 MG/1
40 TABLET ORAL DAILY
Qty: 90 TABLET | Refills: 0 | Status: SHIPPED | OUTPATIENT
Start: 2023-11-08

## 2023-11-09 ENCOUNTER — TELEPHONE (OUTPATIENT)
Dept: PRIMARY CARE CLINIC | Age: 68
End: 2023-11-09

## 2023-11-09 NOTE — TELEPHONE ENCOUNTER
Spoke with Tami adams/rosa Medina to assist/check in to this for patient. Spoke with , patient doesn't have an outstanding bill with us, patient not on file for having a visit in January of 2023, and last [address] visits from Aug 2022 and Oct 2023, both covered by [facility] Dual Plan. If patient has bill, able to bring to office for staff review. If needed, then can send to specialist for additional assistance. Tried calling patient to relay, no answer received and unable to leave a voicemail. Will try calling again later.

## 2023-11-09 NOTE — TELEPHONE ENCOUNTER
Spoke with pharmacy. They stated they did not speak with patient today or recently. Pt picked up two  medications back on 10/9 and did not  meloxicam.  They are in process filling yesterdays prescription. Tried to call patient  no answer. If she would like the meloxicam fulled she can call them and they will fill it. Other medication will be ready to  today.

## 2023-11-09 NOTE — TELEPHONE ENCOUNTER
----- Message from Rebecca Chase sent at 11/9/2023 10:08 AM EST -----  Subject: Message to Provider    QUESTIONS  Information for Provider? Imani Sunshine spoke to the billing office in regards to   a bill for $300. They said to contact the office that we do accept [address] to ask why your office does not accept her insurance. Date of   service was 1-3-2023 for AWMARGARET. She is also saying Dr. Stephanie Jenkins has   discontinued all of her refills per the pharmacy. She wants to know if you   are tired of her and she is a bother to the office now. Please contact   her.  ---------------------------------------------------------------------------  --------------  Clifford ARTHUR  [phone]; OK to leave message on voicemail  ---------------------------------------------------------------------------  --------------  SCRIPT ANSWERS  Relationship to Patient?  Self

## 2023-11-09 NOTE — TELEPHONE ENCOUNTER
Spoke with office staff we do take [facility] Not sure why she was told  we did not. Called patient to let her know that On our end we did not cancel her medication that I can see. Also told her spoke with [address] [facility] office and that we do accept her insurance. She stated that then our billing department needs to know that because they told her different and that the pharmacy also needs to be told different because they are the ones that told her this. I told her to call back the number on her bill and tell them to bill her insurance. Pt is upset and states she really likes us but if we want to drop her as a patient do not do it this way. I explained again that it was not like that and it was a mix up.

## 2023-11-14 DIAGNOSIS — G50.0 TRIGEMINAL NEURALGIA: ICD-10-CM

## 2023-11-14 DIAGNOSIS — R56.9 SEIZURE (HCC): ICD-10-CM

## 2023-11-14 DIAGNOSIS — G50.0 ANESTHESIA DOLOROSA IN DISTRIBUTION OF TRIGEMINAL NERVE: ICD-10-CM

## 2023-11-14 NOTE — TELEPHONE ENCOUNTER
Last Appointment:  10/9/2023  Future Appointments   Date Time Provider [address]   11/28/2023  1:20 PM Sherri Gupta, APRN - CNP [facility]   12/13/2023 10:30 AM Celedonio Epley, MD [facility]   10/9/2024  2:00 PM Celedonio Epley, MD [facility]   10/9/2024  2:30 PM Celedonio Epley, MD [facility]      Pended med for your review and signature.      Electronically signed by Susanna Mccarthy LPN on 60/44/1835 at 2:45 PM

## 2023-11-15 NOTE — TELEPHONE ENCOUNTER
Patient called in a second time checking on status of her refill request yesterday. Stating she is almost out.

## 2023-11-18 RX ORDER — GABAPENTIN 800 MG/1
800 TABLET ORAL 3 TIMES DAILY
Qty: 270 TABLET | Refills: 1 | Status: SHIPPED | OUTPATIENT
Start: 2023-11-18 | End: 2024-05-16

## 2023-12-06 ENCOUNTER — OFFICE VISIT (OUTPATIENT)
Dept: ORTHOPEDIC SURGERY | Age: 68
End: 2023-12-06

## 2023-12-06 VITALS — WEIGHT: 137 LBS | BODY MASS INDEX: 24.27 KG/M2 | HEIGHT: 63 IN

## 2023-12-06 DIAGNOSIS — Z96.641 S/P HIP REPLACEMENT, RIGHT: Primary | ICD-10-CM

## 2023-12-06 RX ORDER — MELOXICAM 15 MG/1
15 TABLET ORAL DAILY PRN
Qty: 30 TABLET | Refills: 1 | Status: SHIPPED | OUTPATIENT
Start: 2023-12-06

## 2023-12-06 NOTE — PROGRESS NOTES
[facility]   ORTHOPAEDIC SURGERY AND SPORTS MEDICINE  DATE OF VISIT: 12/06/23  Follow Up Post Operative Visit     CHIEF COMPLAINT:   Chief Complaint   Patient presents with    Follow Up After Procedure     Pt is almost 4 months out right total hip arthroplasty. Overall doing well. Surgery: Right total hip replacement   Date: 08/14/2023     Subjective:    Kimberlee Bermudez is here for follow up visit s/p above procedure. She is doing well. She has been getting around with no issues. Very happy with her progress    Controlled Substances Monitoring:        Physical Exam:    No data recorded    General: Alert and oriented x3, no acute distress  Cardiovascular/pulmonary: No labored breathing, peripheral perfusion intact  Musculoskeletal:    Exam of the hip shows good range of motion. Leg lengths equivalent. Incision healed. No swelling. Intact motor and sensory function of the foot      Imaging: X-rays show well aligned total hip arthroplasty    Assessment and Plan: She is about 4 months out from right total hip arthroplasty doing well  She will continue with activities as tolerated.   Follow-up in 3 months for final visit    Lani Myers MD  Orthopaedic Surgery   12/6/23  3:11 PM

## 2023-12-13 ENCOUNTER — OFFICE VISIT (OUTPATIENT)
Dept: PRIMARY CARE CLINIC | Age: 68
End: 2023-12-13
Payer: MEDICARE

## 2023-12-13 VITALS
HEART RATE: 95 BPM | BODY MASS INDEX: 24.8 KG/M2 | HEIGHT: 63 IN | TEMPERATURE: 98.1 F | RESPIRATION RATE: 20 BRPM | SYSTOLIC BLOOD PRESSURE: 126 MMHG | OXYGEN SATURATION: 96 % | DIASTOLIC BLOOD PRESSURE: 60 MMHG | WEIGHT: 140 LBS

## 2023-12-13 DIAGNOSIS — R56.9 SEIZURE (HCC): ICD-10-CM

## 2023-12-13 DIAGNOSIS — G50.0 TRIGEMINAL NEURALGIA: ICD-10-CM

## 2023-12-13 DIAGNOSIS — I10 PRIMARY HYPERTENSION: Primary | ICD-10-CM

## 2023-12-13 DIAGNOSIS — E21.3 HYPERPARATHYROIDISM (HCC): ICD-10-CM

## 2023-12-13 DIAGNOSIS — G50.0 ANESTHESIA DOLOROSA IN DISTRIBUTION OF TRIGEMINAL NERVE: ICD-10-CM

## 2023-12-13 DIAGNOSIS — E61.1 IRON DEFICIENCY: ICD-10-CM

## 2023-12-13 DIAGNOSIS — M81.0 AGE-RELATED OSTEOPOROSIS WITHOUT CURRENT PATHOLOGICAL FRACTURE: ICD-10-CM

## 2023-12-13 DIAGNOSIS — Z96.641 S/P TOTAL RIGHT HIP ARTHROPLASTY: ICD-10-CM

## 2023-12-13 PROCEDURE — G8399 PT W/DXA RESULTS DOCUMENT: HCPCS | Performed by: STUDENT IN AN ORGANIZED HEALTH CARE EDUCATION/TRAINING PROGRAM

## 2023-12-13 PROCEDURE — G8420 CALC BMI NORM PARAMETERS: HCPCS | Performed by: STUDENT IN AN ORGANIZED HEALTH CARE EDUCATION/TRAINING PROGRAM

## 2023-12-13 PROCEDURE — 1036F TOBACCO NON-USER: CPT | Performed by: STUDENT IN AN ORGANIZED HEALTH CARE EDUCATION/TRAINING PROGRAM

## 2023-12-13 PROCEDURE — 3017F COLORECTAL CA SCREEN DOC REV: CPT | Performed by: STUDENT IN AN ORGANIZED HEALTH CARE EDUCATION/TRAINING PROGRAM

## 2023-12-13 PROCEDURE — G8484 FLU IMMUNIZE NO ADMIN: HCPCS | Performed by: STUDENT IN AN ORGANIZED HEALTH CARE EDUCATION/TRAINING PROGRAM

## 2023-12-13 PROCEDURE — 1124F ACP DISCUSS-NO DSCNMKR DOCD: CPT | Performed by: STUDENT IN AN ORGANIZED HEALTH CARE EDUCATION/TRAINING PROGRAM

## 2023-12-13 PROCEDURE — 3078F DIAST BP <80 MM HG: CPT | Performed by: STUDENT IN AN ORGANIZED HEALTH CARE EDUCATION/TRAINING PROGRAM

## 2023-12-13 PROCEDURE — G8427 DOCREV CUR MEDS BY ELIG CLIN: HCPCS | Performed by: STUDENT IN AN ORGANIZED HEALTH CARE EDUCATION/TRAINING PROGRAM

## 2023-12-13 PROCEDURE — 3074F SYST BP LT 130 MM HG: CPT | Performed by: STUDENT IN AN ORGANIZED HEALTH CARE EDUCATION/TRAINING PROGRAM

## 2023-12-13 PROCEDURE — 99214 OFFICE O/P EST MOD 30 MIN: CPT | Performed by: STUDENT IN AN ORGANIZED HEALTH CARE EDUCATION/TRAINING PROGRAM

## 2023-12-13 PROCEDURE — 1090F PRES/ABSN URINE INCON ASSESS: CPT | Performed by: STUDENT IN AN ORGANIZED HEALTH CARE EDUCATION/TRAINING PROGRAM

## 2023-12-13 RX ORDER — LISINOPRIL 40 MG/1
40 TABLET ORAL DAILY
Qty: 90 TABLET | Refills: 0 | Status: SHIPPED | OUTPATIENT
Start: 2023-12-13

## 2023-12-13 RX ORDER — OXCARBAZEPINE 600 MG/1
600 TABLET, FILM COATED ORAL 3 TIMES DAILY
Qty: 270 TABLET | Refills: 0 | Status: SHIPPED | OUTPATIENT
Start: 2023-12-13

## 2023-12-13 RX ORDER — MELOXICAM 15 MG/1
15 TABLET ORAL DAILY
Qty: 90 TABLET | Refills: 0 | Status: SHIPPED | OUTPATIENT
Start: 2023-12-13

## 2023-12-13 RX ORDER — AMLODIPINE BESYLATE 10 MG/1
10 TABLET ORAL DAILY
Qty: 90 TABLET | Refills: 0 | Status: SHIPPED | OUTPATIENT
Start: 2023-12-13

## 2023-12-13 NOTE — PROGRESS NOTES
Left lower leg: No edema. Skin:     General: Skin is warm and dry. Neurological:      Mental Status: She is alert and oriented to person, place, and time. Psychiatric:         Attention and Perception: Attention and perception normal.         Mood and Affect: Affect normal. Mood is anxious. Speech: Speech normal.         Behavior: Behavior normal. Behavior is cooperative. Thought Content: Thought content normal.       Testing:     Orders Placed This Encounter   Procedures    [address] and Endocrinology     Referral Priority:   Routine     Referral Type:   Eval and Treat     Referral Reason:   Specialty Services Required     Requested Specialty:   Endocrinology     Number of Visits Requested:   [address] Neurology     Referral Priority:   Routine     Referral Type:   Eval and Treat     Referral Reason:   Specialty Services Required     Requested Specialty:   Neurology     Number of Visits Requested:   1      No results found for this or any previous visit (from the past 24 hour(s)).

## 2023-12-13 NOTE — PATIENT INSTRUCTIONS
[facility] [place] [facility]- Leoncio Mckeon, APRN-CNP   [address]   [facility], [address] [facility]   [address] [place]: [address] [facility] and Endocrinology  [address] [facility]  [address] [facility], [address]  [address] [place]: [address] Neurology   [address] [facility], [address] [place]: [phone]

## 2023-12-27 DIAGNOSIS — I10 PRIMARY HYPERTENSION: ICD-10-CM

## 2023-12-27 RX ORDER — POLYETHYLENE GLYCOL 3350 17 G/17G
POWDER, FOR SOLUTION ORAL
Qty: 1530 G | Refills: 1 | Status: SHIPPED | OUTPATIENT
Start: 2023-12-27

## 2023-12-27 RX ORDER — HYDRALAZINE HYDROCHLORIDE 10 MG/1
10 TABLET, FILM COATED ORAL 3 TIMES DAILY
Qty: 270 TABLET | Refills: 1 | OUTPATIENT
Start: 2023-12-27

## 2023-12-27 NOTE — TELEPHONE ENCOUNTER
Last Appointment:  12/13/2023  Future Appointments   Date Time Provider [address]   3/6/2024  1:50 PM Lance Ponce MD [facility]   3/13/2024 11:00 AM Harmeet Simental MD [facility]   10/9/2024  2:00 PM Harmeet Simental MD [facility]   10/9/2024  2:30 PM Harmeet Simental MD [facility]

## 2023-12-27 NOTE — TELEPHONE ENCOUNTER
Last Appointment:  4/24/2023  Future Appointments   Date Time Provider Department Center   3/6/2024  1:50 PM Rob Royal MD  [facility]   3/13/2024 11:00 AM Richie Lozoya MD [facility]   10/9/2024  2:00 PM Richie Lozoya MD [facility]   10/9/2024  2:30 PM [facility]

## 2023-12-28 PROBLEM — E61.1 IRON DEFICIENCY: Status: ACTIVE | Noted: 2023-12-28

## 2023-12-28 NOTE — ASSESSMENT & PLAN NOTE
Chronic, controlled  Associated with trigeminal neuralgia per patient  Referral to new neurology  Continue oxcarbazepine 600 mg 3 times daily, gabapentin 800 mg 3 times daily

## 2023-12-28 NOTE — ASSESSMENT & PLAN NOTE
Patient has new finding osteoporosis  Has hyperparathyroidism as well as chronic GI issues  Referral to endo to determine best therapy

## 2023-12-28 NOTE — ASSESSMENT & PLAN NOTE
Chronic, controlled  Referral to new neurology  Continue oxcarbazepine 600 mg 3 times daily, gabapentin 800 mg 3 times daily

## 2024-02-01 DIAGNOSIS — E87.1 HYPONATREMIA: ICD-10-CM

## 2024-02-01 DIAGNOSIS — R11.0 NAUSEA: ICD-10-CM

## 2024-02-01 LAB
ALBUMIN SERPL-MCNC: NORMAL G/DL
ALP BLD-CCNC: NORMAL U/L
ALT SERPL-CCNC: NORMAL U/L
ANION GAP SERPL CALCULATED.3IONS-SCNC: NORMAL MMOL/L
AST SERPL-CCNC: NORMAL U/L
BILIRUB SERPL-MCNC: NORMAL MG/DL
BUN BLDV-MCNC: NORMAL MG/DL
CALCIUM SERPL-MCNC: NORMAL MG/DL
CHLORIDE BLD-SCNC: NORMAL MMOL/L
CO2: NORMAL
CREAT SERPL-MCNC: NORMAL MG/DL
EGFR: NORMAL
GLUCOSE BLD-MCNC: NORMAL MG/DL
POTASSIUM SERPL-SCNC: NORMAL MMOL/L
SODIUM BLD-SCNC: NORMAL MMOL/L
TOTAL PROTEIN: NORMAL

## 2024-03-03 DIAGNOSIS — E55.9 VITAMIN D DEFICIENCY: ICD-10-CM

## 2024-03-03 DIAGNOSIS — M81.0 AGE-RELATED OSTEOPOROSIS WITHOUT CURRENT PATHOLOGICAL FRACTURE: Primary | ICD-10-CM

## 2024-03-03 PROBLEM — E61.1 IRON DEFICIENCY: Status: RESOLVED | Noted: 2023-12-28 | Resolved: 2024-03-03

## 2024-03-03 PROBLEM — E21.3 HYPERPARATHYROIDISM (HCC): Status: RESOLVED | Noted: 2022-06-10 | Resolved: 2024-03-03

## 2024-03-06 ENCOUNTER — OFFICE VISIT (OUTPATIENT)
Dept: ORTHOPEDIC SURGERY | Age: 69
End: 2024-03-06
Payer: MEDICARE

## 2024-03-06 DIAGNOSIS — M16.11 PRIMARY OSTEOARTHRITIS OF RIGHT HIP: ICD-10-CM

## 2024-03-06 DIAGNOSIS — Z96.641 S/P HIP REPLACEMENT, RIGHT: Primary | ICD-10-CM

## 2024-03-06 PROCEDURE — G8420 CALC BMI NORM PARAMETERS: HCPCS | Performed by: ORTHOPAEDIC SURGERY

## 2024-03-06 PROCEDURE — 1124F ACP DISCUSS-NO DSCNMKR DOCD: CPT | Performed by: ORTHOPAEDIC SURGERY

## 2024-03-06 PROCEDURE — G8427 DOCREV CUR MEDS BY ELIG CLIN: HCPCS | Performed by: ORTHOPAEDIC SURGERY

## 2024-03-06 PROCEDURE — 1090F PRES/ABSN URINE INCON ASSESS: CPT | Performed by: ORTHOPAEDIC SURGERY

## 2024-03-06 PROCEDURE — 3017F COLORECTAL CA SCREEN DOC REV: CPT | Performed by: ORTHOPAEDIC SURGERY

## 2024-03-06 PROCEDURE — 99213 OFFICE O/P EST LOW 20 MIN: CPT | Performed by: ORTHOPAEDIC SURGERY

## 2024-03-06 PROCEDURE — G8399 PT W/DXA RESULTS DOCUMENT: HCPCS | Performed by: ORTHOPAEDIC SURGERY

## 2024-03-06 PROCEDURE — 1036F TOBACCO NON-USER: CPT | Performed by: ORTHOPAEDIC SURGERY

## 2024-03-06 PROCEDURE — G8484 FLU IMMUNIZE NO ADMIN: HCPCS | Performed by: ORTHOPAEDIC SURGERY

## 2024-03-06 NOTE — PROGRESS NOTES
[facility]   ORTHOPAEDIC SURGERY AND SPORTS MEDICINE  DATE OF VISIT: 03/06/24  Follow Up Post Operative Visit     CHIEF COMPLAINT:   Chief Complaint   Patient presents with    Follow Up After Procedure     6 month p/o:  Right total hip replacement 8/14/2023. Pt denies any issues.            Surgery: Right total hip arthroplasty  Date: 08/14/2023    Subjective:    Melly Ga is here for follow up visit s/p above procedure.  She is doing well.  She has resumed normal activities without limitations.  She has no chief complaints today.    Controlled Substances Monitoring:        Physical Exam:    No data recorded    General: Alert and oriented x3, no acute distress  Cardiovascular/pulmonary: No labored breathing, peripheral perfusion intact  Musculoskeletal:    Right hip exam internal/external rotation are intact.  Intact logroll, SONYA, FADIR testing.  Negative pain or impingement on exam.  Negative weakness on exam.  Gait stable      Imaging: Reviewed    Assessment and Plan: Status post right total hip arthroplasty    Patient is 6 months out from procedure listed but doing well.  She has no chief complaints today.  Exam displays excellent motion and strength of the right hip.  She is resume normal activities as tolerated.  She will follow-up as needed.      MENDOZA Lopes  Orthopedic Surgery   03/06/24  1:39 PM    Staff Addendum    I have seen and evaluated the patient and agree with the assessment and plan as documented by Andrae Gupta CNP. I have performed the key components of the history and physical examination and concur with the findings and plan, and have made changes where appropriate/necessary.          Rob Royal MD  [facility]

## 2024-03-14 ENCOUNTER — TELEPHONE (OUTPATIENT)
Dept: PRIMARY CARE CLINIC | Age: 69
End: 2024-03-14

## 2024-03-14 NOTE — TELEPHONE ENCOUNTER
Patient missed her appointment yesterday. Please call patient to let her know my schedule is full but we can add her to the cancellation list. I am not aware of any local support groups but she could look online.

## 2024-03-15 NOTE — TELEPHONE ENCOUNTER
Called and let patient know she missed her appointment on Wednesday and we put her on Cancellation list.  She did state she found a local support group online.

## 2024-03-22 DIAGNOSIS — G50.0 ANESTHESIA DOLOROSA IN DISTRIBUTION OF TRIGEMINAL NERVE: ICD-10-CM

## 2024-03-22 DIAGNOSIS — G50.0 TRIGEMINAL NEURALGIA: ICD-10-CM

## 2024-03-22 DIAGNOSIS — R56.9 SEIZURE (HCC): ICD-10-CM

## 2024-03-24 RX ORDER — OXCARBAZEPINE 600 MG/1
600 TABLET, FILM COATED ORAL 3 TIMES DAILY
Qty: 270 TABLET | Refills: 0 | Status: SHIPPED | OUTPATIENT
Start: 2024-03-24

## 2024-04-25 DIAGNOSIS — K21.9 GASTROESOPHAGEAL REFLUX DISEASE, UNSPECIFIED WHETHER ESOPHAGITIS PRESENT: ICD-10-CM

## 2024-04-25 DIAGNOSIS — Z96.641 S/P TOTAL RIGHT HIP ARTHROPLASTY: ICD-10-CM

## 2024-04-25 RX ORDER — MELOXICAM 15 MG/1
15 TABLET ORAL DAILY
Qty: 90 TABLET | Refills: 0 | Status: SHIPPED | OUTPATIENT
Start: 2024-04-25

## 2024-04-25 RX ORDER — PANTOPRAZOLE SODIUM 40 MG/1
40 TABLET, DELAYED RELEASE ORAL EVERY MORNING
Qty: 90 TABLET | Refills: 0 | Status: SHIPPED | OUTPATIENT
Start: 2024-04-25

## 2024-04-25 NOTE — TELEPHONE ENCOUNTER
Last Appointment:  12/13/2023  Future Appointments   Date Time Provider Department Center   10/9/2024  2:00 PM Richie Lozoya MD [place] [facility]   10/9/2024  2:30 PM Richie Lozoya MD [place] [facility]

## 2024-04-26 DIAGNOSIS — I10 PRIMARY HYPERTENSION: ICD-10-CM

## 2024-04-26 RX ORDER — LISINOPRIL 40 MG/1
40 TABLET ORAL DAILY
Qty: 90 TABLET | Refills: 0 | Status: SHIPPED | OUTPATIENT
Start: 2024-04-26

## 2024-04-26 NOTE — TELEPHONE ENCOUNTER
Last Appointment:  12/13/2023  Future Appointments   Date Time Provider Department Center   6/12/2024  1:00 PM Richie Lozoya MD [facility]   10/9/2024  2:00 PM Richie Lozoya MD [place] [facility]   10/9/2024  2:30 PM Richie Lozoya MD [place] [facility]

## 2024-05-06 DIAGNOSIS — G50.0 ANESTHESIA DOLOROSA IN DISTRIBUTION OF TRIGEMINAL NERVE: ICD-10-CM

## 2024-05-06 DIAGNOSIS — R56.9 SEIZURE (HCC): ICD-10-CM

## 2024-05-06 DIAGNOSIS — G50.0 TRIGEMINAL NEURALGIA: ICD-10-CM

## 2024-05-06 RX ORDER — GABAPENTIN 800 MG/1
800 TABLET ORAL 3 TIMES DAILY
Qty: 270 TABLET | Refills: 1 | Status: SHIPPED | OUTPATIENT
Start: 2024-05-06 | End: 2024-11-02

## 2024-05-19 ENCOUNTER — HOSPITAL ENCOUNTER (EMERGENCY)
Age: 69
Discharge: HOME | End: 2024-05-19
Attending: EMERGENCY MEDICINE
Payer: COMMERCIAL

## 2024-05-19 ENCOUNTER — APPOINTMENT (OUTPATIENT)
Dept: GENERAL RADIOLOGY | Age: 69
End: 2024-05-19
Payer: COMMERCIAL

## 2024-05-19 ENCOUNTER — APPOINTMENT (OUTPATIENT)
Dept: CT IMAGING | Age: 69
End: 2024-05-19
Payer: COMMERCIAL

## 2024-05-19 VITALS
WEIGHT: 135 LBS | HEIGHT: 62 IN | SYSTOLIC BLOOD PRESSURE: 142 MMHG | OXYGEN SATURATION: 95 % | DIASTOLIC BLOOD PRESSURE: 78 MMHG | TEMPERATURE: 98.2 F | RESPIRATION RATE: 16 BRPM | BODY MASS INDEX: 24.84 KG/M2 | HEART RATE: 80 BPM

## 2024-05-19 DIAGNOSIS — R07.89 CHEST WALL PAIN: Primary | ICD-10-CM

## 2024-05-19 LAB
ALBUMIN SERPL-MCNC: 4.3 G/DL (ref 3.5–5.2)
ALP SERPL-CCNC: 97 U/L (ref 35–104)
ALT SERPL-CCNC: 9 U/L (ref 0–32)
ANION GAP SERPL CALCULATED.3IONS-SCNC: 9 MMOL/L (ref 7–16)
AST SERPL-CCNC: 12 U/L (ref 0–31)
BASOPHILS # BLD: 0.03 K/UL (ref 0–0.2)
BASOPHILS NFR BLD: 0 % (ref 0–2)
BILIRUB SERPL-MCNC: <0.2 MG/DL (ref 0–1.2)
BNP SERPL-MCNC: 79 PG/ML (ref 0–125)
BUN SERPL-MCNC: 19 MG/DL (ref 6–23)
CALCIUM SERPL-MCNC: 8.9 MG/DL (ref 8.6–10.2)
CHLORIDE SERPL-SCNC: 102 MMOL/L (ref 98–107)
CO2 SERPL-SCNC: 25 MMOL/L (ref 22–29)
CREAT SERPL-MCNC: 0.6 MG/DL (ref 0.5–1)
D-DIMER QUANTITATIVE: <200 NG/ML DDU (ref 0–230)
EOSINOPHIL # BLD: 0.03 K/UL (ref 0.05–0.5)
EOSINOPHILS RELATIVE PERCENT: 0 % (ref 0–6)
ERYTHROCYTE [DISTWIDTH] IN BLOOD BY AUTOMATED COUNT: 12.7 % (ref 11.5–15)
GFR, ESTIMATED: >90 ML/MIN/1.73M2
GLUCOSE SERPL-MCNC: 109 MG/DL (ref 74–99)
HCT VFR BLD AUTO: 33.4 % (ref 34–48)
HGB BLD-MCNC: 11 G/DL (ref 11.5–15.5)
IMM GRANULOCYTES # BLD AUTO: <0.03 K/UL (ref 0–0.58)
IMM GRANULOCYTES NFR BLD: 0 % (ref 0–5)
INR PPP: 1
LIPASE SERPL-CCNC: 23 U/L (ref 13–60)
LYMPHOCYTES NFR BLD: 1.36 K/UL (ref 1.5–4)
LYMPHOCYTES RELATIVE PERCENT: 18 % (ref 20–42)
MAGNESIUM SERPL-MCNC: 2 MG/DL (ref 1.6–2.6)
MCH RBC QN AUTO: 29.9 PG (ref 26–35)
MCHC RBC AUTO-ENTMCNC: 32.9 G/DL (ref 32–34.5)
MCV RBC AUTO: 90.8 FL (ref 80–99.9)
MONOCYTES NFR BLD: 0.63 K/UL (ref 0.1–0.95)
MONOCYTES NFR BLD: 8 % (ref 2–12)
NEUTROPHILS NFR BLD: 73 % (ref 43–80)
NEUTS SEG NFR BLD: 5.49 K/UL (ref 1.8–7.3)
PLATELET # BLD AUTO: 290 K/UL (ref 130–450)
PMV BLD AUTO: 9 FL (ref 7–12)
POTASSIUM SERPL-SCNC: 3.5 MMOL/L (ref 3.5–5)
PROT SERPL-MCNC: 7.2 G/DL (ref 6.4–8.3)
PROTHROMBIN TIME: 10.7 SEC (ref 9.3–12.4)
RBC # BLD AUTO: 3.68 M/UL (ref 3.5–5.5)
SODIUM SERPL-SCNC: 136 MMOL/L (ref 132–146)
TROPONIN I SERPL HS-MCNC: <6 NG/L (ref 0–9)
WBC OTHER # BLD: 7.6 K/UL (ref 4.5–11.5)

## 2024-05-19 PROCEDURE — 96374 THER/PROPH/DIAG INJ IV PUSH: CPT

## 2024-05-19 PROCEDURE — 71275 CT ANGIOGRAPHY CHEST: CPT

## 2024-05-19 PROCEDURE — 85379 FIBRIN DEGRADATION QUANT: CPT

## 2024-05-19 PROCEDURE — 85025 COMPLETE CBC W/AUTO DIFF WBC: CPT

## 2024-05-19 PROCEDURE — 74174 CTA ABD&PLVS W/CONTRAST: CPT

## 2024-05-19 PROCEDURE — 83880 ASSAY OF NATRIURETIC PEPTIDE: CPT

## 2024-05-19 PROCEDURE — 83690 ASSAY OF LIPASE: CPT

## 2024-05-19 PROCEDURE — 84484 ASSAY OF TROPONIN QUANT: CPT

## 2024-05-19 PROCEDURE — 85610 PROTHROMBIN TIME: CPT

## 2024-05-19 PROCEDURE — 6360000004 HC RX CONTRAST MEDICATION: Performed by: RADIOLOGY

## 2024-05-19 PROCEDURE — 96375 TX/PRO/DX INJ NEW DRUG ADDON: CPT

## 2024-05-19 PROCEDURE — 71045 X-RAY EXAM CHEST 1 VIEW: CPT

## 2024-05-19 PROCEDURE — 80053 COMPREHEN METABOLIC PANEL: CPT

## 2024-05-19 PROCEDURE — 6360000002 HC RX W HCPCS: Performed by: EMERGENCY MEDICINE

## 2024-05-19 PROCEDURE — 93005 ELECTROCARDIOGRAM TRACING: CPT | Performed by: EMERGENCY MEDICINE

## 2024-05-19 PROCEDURE — 99285 EMERGENCY DEPT VISIT HI MDM: CPT

## 2024-05-19 PROCEDURE — 83735 ASSAY OF MAGNESIUM: CPT

## 2024-05-19 RX ORDER — PREDNISONE 50 MG/1
50 TABLET ORAL DAILY
Qty: 5 TABLET | Refills: 0 | Status: SHIPPED | OUTPATIENT
Start: 2024-05-19 | End: 2024-05-24

## 2024-05-19 RX ORDER — MORPHINE SULFATE 4 MG/ML
4 INJECTION, SOLUTION INTRAMUSCULAR; INTRAVENOUS ONCE
Status: COMPLETED | OUTPATIENT
Start: 2024-05-19 | End: 2024-05-19

## 2024-05-19 RX ORDER — DEXAMETHASONE SODIUM PHOSPHATE 10 MG/ML
10 INJECTION INTRAMUSCULAR; INTRAVENOUS ONCE
Status: COMPLETED | OUTPATIENT
Start: 2024-05-19 | End: 2024-05-19

## 2024-05-19 RX ADMIN — MORPHINE SULFATE 4 MG: 4 INJECTION, SOLUTION INTRAMUSCULAR; INTRAVENOUS at 05:43

## 2024-05-19 RX ADMIN — IOPAMIDOL 75 ML: 755 INJECTION, SOLUTION INTRAVENOUS at 05:04

## 2024-05-19 RX ADMIN — DEXAMETHASONE SODIUM PHOSPHATE 10 MG: 10 INJECTION INTRAMUSCULAR; INTRAVENOUS at 05:43

## 2024-05-19 ASSESSMENT — PAIN SCALES - GENERAL: PAINLEVEL_OUTOF10: 10

## 2024-05-19 ASSESSMENT — PAIN DESCRIPTION - ORIENTATION: ORIENTATION: LEFT

## 2024-05-19 ASSESSMENT — PAIN - FUNCTIONAL ASSESSMENT: PAIN_FUNCTIONAL_ASSESSMENT: 0-10

## 2024-05-19 NOTE — DISCHARGE INSTRUCTIONS
Call family doctor tomorrow and schedule a followup appointment to be seen in 2 days    Have your doctor obtain  finalized report of CT scan today    CTA CHEST W CONTRAST   Final Result   No evidence for aortic aneurysm or acute aortic dissection.  No pulmonary   embolism identified to the segmental level. No consolidation process. No   acute intra-abdominal pelvic process appreciated.      Additional observations as above.         CTA ABDOMEN PELVIS W CONTRAST   Final Result   No evidence for aortic aneurysm or acute aortic dissection.  No pulmonary   embolism identified to the segmental level. No consolidation process. No   acute intra-abdominal pelvic process appreciated.      Additional observations as above.         XR CHEST PORTABLE   Final Result   No acute cardiopulmonary disease.

## 2024-05-19 NOTE — ED PROVIDER NOTES
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    Anxiety 06/10/2022    Psychophysiological insomnia 06/24/2022    Cervical stenosis of spine 06/24/2022    Lumbar stenosis with neurogenic claudication 06/24/2022    Dysphagia 06/24/2022    Vertigo 03/10/2023    Primary osteoarthritis of right hip 05/01/2023    S/P total right hip arthroplasty 08/14/2023    New onset seizure with abnormal neurological exam without head trauma (HCC) 08/16/2023    Age-related osteoporosis without current pathological fracture 12/13/2023    Vitamin D deficiency 03/03/2024     Resolved Ambulatory Problems     Diagnosis Date Noted    Anemia 04/17/2012    HTN (hypertension) 04/17/2012    Depression 10/30/2019    Hypertensive disorder 02/19/2020    Ingestion of substance 01/14/2022    Hyperparathyroidism (HCC) 06/10/2022    Hypertensive urgency 05/09/2023    Hypertensive emergency 05/09/2023    Blurry vision 05/10/2023    Iron deficiency 12/28/2023     Past Medical History:   Diagnosis Date    Acid reflux     Arthritis     Difficult intravenous access     Hypertension          CONSULTS: (Who and What was discussed)  None    Discussion with Other Profesionals : None    Social Determinants : Patient has significant healthcare illiteracy    Records Reviewed : Outpatient Notes      CC/HPI Summary, DDx, ED Course, and Reassessment:   Patient is a 68-year-old female who has had 1 week of reproducible chest wall pain that hurts when she moves her left arm.  She also notes some back pain on that side.  She also notes of abdominal pain on that side she notes her whole left side hurts.  She denies any weakness she denies any radicular symptoms she denies any headache.  She denies any exertional chest pain.  Differential diagnosis includes costochondritis pleurisy aortic dissection ACS

## 2024-05-19 NOTE — ED NOTES
Name: Melly Ga  : 1955  MRN: [id number]    Date: 2024    Benefits of immediately proceeding with Radiology exam outweigh the risks and therefore the following is being waived:      [] Pregnancy test    [] Protocol for Iodine allergy    [] MRI questionnaire    [x] BUN/Creatinine        DO Cindy Cook Joseph A, DO  24 0304

## 2024-05-20 LAB
EKG ATRIAL RATE: 97 BPM
EKG P AXIS: 38 DEGREES
EKG P-R INTERVAL: 134 MS
EKG Q-T INTERVAL: 358 MS
EKG QRS DURATION: 88 MS
EKG QTC CALCULATION (BAZETT): 454 MS
EKG R AXIS: 60 DEGREES
EKG T AXIS: 49 DEGREES
EKG VENTRICULAR RATE: 97 BPM

## 2024-05-20 PROCEDURE — 93010 ELECTROCARDIOGRAM REPORT: CPT | Performed by: INTERNAL MEDICINE

## 2024-05-29 DIAGNOSIS — E55.9 VITAMIN D DEFICIENCY: ICD-10-CM

## 2024-05-29 DIAGNOSIS — M81.0 AGE-RELATED OSTEOPOROSIS WITHOUT CURRENT PATHOLOGICAL FRACTURE: ICD-10-CM

## 2024-05-29 DIAGNOSIS — F51.04 PSYCHOPHYSIOLOGICAL INSOMNIA: ICD-10-CM

## 2024-05-29 RX ORDER — TRAZODONE HYDROCHLORIDE 50 MG/1
50 TABLET ORAL NIGHTLY PRN
Qty: 90 TABLET | Refills: 1 | Status: SHIPPED | OUTPATIENT
Start: 2024-05-29

## 2024-05-29 RX ORDER — ACETAMINOPHEN 160 MG
TABLET,DISINTEGRATING ORAL DAILY
Qty: 90 CAPSULE | Refills: 1 | Status: SHIPPED | OUTPATIENT
Start: 2024-05-29

## 2024-05-29 NOTE — TELEPHONE ENCOUNTER
Last Appointment:  12/13/2023  Future Appointments   Date Time Provider Department Center   9/20/2024  8:30 AM Richie Lozoya MD [facility]   10/9/2024  2:00 PM Richie Lozoya MD [facility]   10/9/2024  2:30 PM Richie Lozoya MD [facility]

## 2024-06-28 ENCOUNTER — APPOINTMENT (OUTPATIENT)
Dept: CT IMAGING | Age: 69
End: 2024-06-28
Payer: COMMERCIAL

## 2024-06-28 ENCOUNTER — HOSPITAL ENCOUNTER (EMERGENCY)
Age: 69
Discharge: LEFT BEFORE DISCHARGE | End: 2024-06-29
Attending: EMERGENCY MEDICINE
Payer: COMMERCIAL

## 2024-06-28 ENCOUNTER — TELEPHONE (OUTPATIENT)
Dept: PRIMARY CARE CLINIC | Age: 69
End: 2024-06-28

## 2024-06-28 DIAGNOSIS — G93.40 CEREBELLAR DYSFUNCTION: ICD-10-CM

## 2024-06-28 DIAGNOSIS — R42 VERTIGO: Primary | ICD-10-CM

## 2024-06-28 DIAGNOSIS — R56.9 SEIZURE (HCC): ICD-10-CM

## 2024-06-28 LAB
ANION GAP SERPL CALCULATED.3IONS-SCNC: 13 MMOL/L (ref 7–16)
BASOPHILS # BLD: 0.03 K/UL (ref 0–0.2)
BASOPHILS NFR BLD: 1 % (ref 0–2)
BUN SERPL-MCNC: 40 MG/DL (ref 6–23)
CALCIUM SERPL-MCNC: 9.3 MG/DL (ref 8.6–10.2)
CHLORIDE SERPL-SCNC: 94 MMOL/L (ref 98–107)
CO2 SERPL-SCNC: 23 MMOL/L (ref 22–29)
CREAT SERPL-MCNC: 1 MG/DL (ref 0.5–1)
EKG ATRIAL RATE: 95 BPM
EKG P AXIS: 51 DEGREES
EKG P-R INTERVAL: 144 MS
EKG Q-T INTERVAL: 356 MS
EKG QRS DURATION: 94 MS
EKG QTC CALCULATION (BAZETT): 447 MS
EKG R AXIS: 46 DEGREES
EKG T AXIS: 58 DEGREES
EKG VENTRICULAR RATE: 95 BPM
EOSINOPHIL # BLD: 0.02 K/UL (ref 0.05–0.5)
EOSINOPHILS RELATIVE PERCENT: 0 % (ref 0–6)
ERYTHROCYTE [DISTWIDTH] IN BLOOD BY AUTOMATED COUNT: 12.3 % (ref 11.5–15)
GFR, ESTIMATED: 66 ML/MIN/1.73M2
GLUCOSE BLD-MCNC: 103 MG/DL (ref 74–99)
GLUCOSE SERPL-MCNC: 103 MG/DL (ref 74–99)
HCT VFR BLD AUTO: 33.8 % (ref 34–48)
HGB BLD-MCNC: 11.3 G/DL (ref 11.5–15.5)
IMM GRANULOCYTES # BLD AUTO: <0.03 K/UL (ref 0–0.58)
IMM GRANULOCYTES NFR BLD: 0 % (ref 0–5)
INR PPP: 1.1
LYMPHOCYTES NFR BLD: 2.46 K/UL (ref 1.5–4)
LYMPHOCYTES RELATIVE PERCENT: 41 % (ref 20–42)
MAGNESIUM SERPL-MCNC: 2.4 MG/DL (ref 1.6–2.6)
MCH RBC QN AUTO: 30 PG (ref 26–35)
MCHC RBC AUTO-ENTMCNC: 33.4 G/DL (ref 32–34.5)
MCV RBC AUTO: 89.7 FL (ref 80–99.9)
MONOCYTES NFR BLD: 0.6 K/UL (ref 0.1–0.95)
MONOCYTES NFR BLD: 10 % (ref 2–12)
NEUTROPHILS NFR BLD: 48 % (ref 43–80)
NEUTS SEG NFR BLD: 2.92 K/UL (ref 1.8–7.3)
PLATELET # BLD AUTO: 291 K/UL (ref 130–450)
PMV BLD AUTO: 8.6 FL (ref 7–12)
POTASSIUM SERPL-SCNC: 4.2 MMOL/L (ref 3.5–5)
PROTHROMBIN TIME: 11.4 SEC (ref 9.3–12.4)
RBC # BLD AUTO: 3.77 M/UL (ref 3.5–5.5)
SODIUM SERPL-SCNC: 130 MMOL/L (ref 132–146)
WBC OTHER # BLD: 6 K/UL (ref 4.5–11.5)

## 2024-06-28 PROCEDURE — 70496 CT ANGIOGRAPHY HEAD: CPT

## 2024-06-28 PROCEDURE — 85025 COMPLETE CBC W/AUTO DIFF WBC: CPT

## 2024-06-28 PROCEDURE — 82962 GLUCOSE BLOOD TEST: CPT

## 2024-06-28 PROCEDURE — 80048 BASIC METABOLIC PNL TOTAL CA: CPT

## 2024-06-28 PROCEDURE — 6370000000 HC RX 637 (ALT 250 FOR IP): Performed by: EMERGENCY MEDICINE

## 2024-06-28 PROCEDURE — 96375 TX/PRO/DX INJ NEW DRUG ADDON: CPT

## 2024-06-28 PROCEDURE — 96374 THER/PROPH/DIAG INJ IV PUSH: CPT

## 2024-06-28 PROCEDURE — 85610 PROTHROMBIN TIME: CPT

## 2024-06-28 PROCEDURE — 70450 CT HEAD/BRAIN W/O DYE: CPT

## 2024-06-28 PROCEDURE — 0042T CT BRAIN PERFUSION: CPT

## 2024-06-28 PROCEDURE — 83735 ASSAY OF MAGNESIUM: CPT

## 2024-06-28 PROCEDURE — 6360000002 HC RX W HCPCS: Performed by: EMERGENCY MEDICINE

## 2024-06-28 PROCEDURE — 6360000004 HC RX CONTRAST MEDICATION: Performed by: RADIOLOGY

## 2024-06-28 PROCEDURE — 99285 EMERGENCY DEPT VISIT HI MDM: CPT

## 2024-06-28 PROCEDURE — 70498 CT ANGIOGRAPHY NECK: CPT

## 2024-06-28 RX ORDER — LEVETIRACETAM 500 MG/5ML
1000 INJECTION, SOLUTION, CONCENTRATE INTRAVENOUS ONCE
Status: COMPLETED | OUTPATIENT
Start: 2024-06-28 | End: 2024-06-28

## 2024-06-28 RX ORDER — ASPIRIN 81 MG/1
324 TABLET, CHEWABLE ORAL ONCE
Status: COMPLETED | OUTPATIENT
Start: 2024-06-28 | End: 2024-06-28

## 2024-06-28 RX ORDER — FENTANYL CITRATE 50 UG/ML
50 INJECTION, SOLUTION INTRAMUSCULAR; INTRAVENOUS ONCE
Status: COMPLETED | OUTPATIENT
Start: 2024-06-28 | End: 2024-06-28

## 2024-06-28 RX ORDER — LORAZEPAM 2 MG/ML
1 INJECTION INTRAMUSCULAR ONCE
Status: COMPLETED | OUTPATIENT
Start: 2024-06-28 | End: 2024-06-28

## 2024-06-28 RX ORDER — ONDANSETRON 2 MG/ML
4 INJECTION INTRAMUSCULAR; INTRAVENOUS ONCE
Status: COMPLETED | OUTPATIENT
Start: 2024-06-28 | End: 2024-06-28

## 2024-06-28 RX ADMIN — LEVETIRACETAM 1000 MG: 100 INJECTION, SOLUTION INTRAVENOUS at 18:35

## 2024-06-28 RX ADMIN — ONDANSETRON 4 MG: 2 INJECTION INTRAMUSCULAR; INTRAVENOUS at 18:35

## 2024-06-28 RX ADMIN — FENTANYL CITRATE 50 MCG: 50 INJECTION INTRAMUSCULAR; INTRAVENOUS at 18:35

## 2024-06-28 RX ADMIN — LORAZEPAM 1 MG: 2 INJECTION INTRAMUSCULAR; INTRAVENOUS at 17:33

## 2024-06-28 RX ADMIN — ASPIRIN 324 MG: 81 TABLET, CHEWABLE ORAL at 20:08

## 2024-06-28 RX ADMIN — IOPAMIDOL 100 ML: 755 INJECTION, SOLUTION INTRAVENOUS at 16:58

## 2024-06-28 NOTE — ED PROVIDER NOTES
arteries   3. No perfusion abnormality.         CT Head W/O Contrast   Final Result   Addendum (preliminary) 1 of 1   ADDENDUM:   The impression of this report was read to MARCIO HENSLEY at 5:51 Pm on   6/28/2024 by a member of the core team support staff.         Final   No evidence of acute intracranial hemorrhage or mass effect.                EKG:  This EKG is signed and interpreted by the EP.    Time: 1739  Rate: 95  Rhythm: Sinus  Interpretation: non-specific EKG  Comparison: None      ------------------------- NURSING NOTES AND VITALS REVIEWED ---------------------------   The nursing notes within the ED encounter and vital signs as below have been reviewed by myself.  /61   Pulse 81   Temp 97.8 °F (36.6 °C) (Temporal)   Resp 11   Wt 60.3 kg (133 lb)   SpO2 98%   BMI 24.33 kg/m²   Oxygen Saturation Interpretation: Normal    The patient’s available past medical records and past encounters were reviewed.        ------------------------------ ED COURSE/MEDICAL DECISION MAKING----------------------  Medications   aspirin chewable tablet 324 mg (has no administration in time range)   iopamidol (ISOVUE-370) 76 % injection 100 mL (100 mLs IntraVENous Given 6/28/24 1658)   LORazepam (ATIVAN) injection 1 mg (1 mg IntraVENous Given 6/28/24 1733)   levETIRAcetam (KEPPRA) injection 1,000 mg (1,000 mg IntraVENous Given 6/28/24 1835)   fentaNYL (SUBLIMAZE) injection 50 mcg (50 mcg IntraVENous Given 6/28/24 1835)   ondansetron (ZOFRAN) injection 4 mg (4 mg IntraVENous Given 6/28/24 1835)         ED COURSE:             This patient's ED course included: a personal history and physicial examination    This patient has remained hemodynamically stable during their ED course.      Re-Evaluations:             Re-evaluation.  Patient’s symptoms are improving    Re-examination  6/28/24   4:50 PM EDT          Vital Signs:   Vitals:    06/28/24 1802 06/28/24 1804 06/28/24 1838 06/28/24 1924   BP:   115/61    Pulse:  86 87  81   Resp:  14 15 11   Temp:       TempSrc:       SpO2: 96%  99% 98%   Weight:   60.3 kg (133 lb)            Consultations:             33 min excluding billable procedures    Critical Care: hospitalist        Counseling:   The emergency provider has spoken with the patient and spouse/SO and discussed today’s results, in addition to providing specific details for the plan of care and counseling regarding the diagnosis and prognosis.  Questions are answered at this time and they are agreeable with the plan.       --------------------------------- IMPRESSION AND DISPOSITION ---------------------------------    IMPRESSION  1. Vertigo    2. Seizure (HCC)    3. Cerebellar dysfunction        DISPOSITION  Disposition: Transfer to [facility] to [facility] tele  Patient condition is stable    NOTE: This report was transcribed using voice recognition software. Every effort was made to ensure accuracy; however, inadvertent computerized transcription errors may be present        Daquan Flynn MD  06/28/24 1954       Daquan Flynn MD  06/28/24 1954

## 2024-06-28 NOTE — TELEPHONE ENCOUNTER
Patient called in stating for the last 2 days she has been experiencing double vision and dizziness. She said she will be going to the ER to get evaluated once her  gets home from work but just wanted to let you know what was going on.

## 2024-06-29 VITALS
RESPIRATION RATE: 14 BRPM | WEIGHT: 133 LBS | DIASTOLIC BLOOD PRESSURE: 96 MMHG | HEIGHT: 62 IN | BODY MASS INDEX: 24.48 KG/M2 | OXYGEN SATURATION: 100 % | HEART RATE: 80 BPM | TEMPERATURE: 97.9 F | SYSTOLIC BLOOD PRESSURE: 174 MMHG

## 2024-06-29 PROCEDURE — 6370000000 HC RX 637 (ALT 250 FOR IP): Performed by: EMERGENCY MEDICINE

## 2024-06-29 PROCEDURE — 96375 TX/PRO/DX INJ NEW DRUG ADDON: CPT

## 2024-06-29 PROCEDURE — 6360000002 HC RX W HCPCS: Performed by: EMERGENCY MEDICINE

## 2024-06-29 RX ORDER — MIDAZOLAM HYDROCHLORIDE 2 MG/2ML
1 INJECTION, SOLUTION INTRAMUSCULAR; INTRAVENOUS EVERY 5 MIN PRN
Status: DISCONTINUED | OUTPATIENT
Start: 2024-06-29 | End: 2024-06-29 | Stop reason: HOSPADM

## 2024-06-29 RX ORDER — ASPIRIN 325 MG
325 TABLET, DELAYED RELEASE (ENTERIC COATED) ORAL DAILY
Status: DISCONTINUED | OUTPATIENT
Start: 2024-06-29 | End: 2024-06-29 | Stop reason: HOSPADM

## 2024-06-29 RX ORDER — MORPHINE SULFATE 4 MG/ML
4 INJECTION, SOLUTION INTRAMUSCULAR; INTRAVENOUS
Status: DISCONTINUED | OUTPATIENT
Start: 2024-06-29 | End: 2024-06-29 | Stop reason: HOSPADM

## 2024-06-29 RX ORDER — ONDANSETRON 2 MG/ML
4 INJECTION INTRAMUSCULAR; INTRAVENOUS ONCE
Status: DISCONTINUED | OUTPATIENT
Start: 2024-06-29 | End: 2024-06-29 | Stop reason: HOSPADM

## 2024-06-29 RX ORDER — GABAPENTIN 400 MG/1
800 CAPSULE ORAL 3 TIMES DAILY
Status: DISCONTINUED | OUTPATIENT
Start: 2024-06-29 | End: 2024-06-29 | Stop reason: HOSPADM

## 2024-06-29 RX ORDER — OXCARBAZEPINE 300 MG/1
600 TABLET, FILM COATED ORAL 2 TIMES DAILY
Status: DISCONTINUED | OUTPATIENT
Start: 2024-06-29 | End: 2024-06-29 | Stop reason: HOSPADM

## 2024-06-29 RX ORDER — MORPHINE SULFATE 2 MG/ML
2 INJECTION, SOLUTION INTRAMUSCULAR; INTRAVENOUS
Status: DISCONTINUED | OUTPATIENT
Start: 2024-06-29 | End: 2024-06-29 | Stop reason: HOSPADM

## 2024-06-29 RX ORDER — ONDANSETRON 2 MG/ML
4 INJECTION INTRAMUSCULAR; INTRAVENOUS EVERY 6 HOURS PRN
Status: DISCONTINUED | OUTPATIENT
Start: 2024-06-29 | End: 2024-06-29 | Stop reason: HOSPADM

## 2024-06-29 RX ORDER — KETOROLAC TROMETHAMINE 15 MG/ML
15 INJECTION, SOLUTION INTRAMUSCULAR; INTRAVENOUS EVERY 6 HOURS PRN
Status: DISCONTINUED | OUTPATIENT
Start: 2024-06-29 | End: 2024-06-29 | Stop reason: HOSPADM

## 2024-06-29 RX ADMIN — KETOROLAC TROMETHAMINE 15 MG: 15 INJECTION, SOLUTION INTRAMUSCULAR; INTRAVENOUS at 08:06

## 2024-06-29 RX ADMIN — ASPIRIN 325 MG: 325 TABLET, COATED ORAL at 10:49

## 2024-06-29 ASSESSMENT — PAIN - FUNCTIONAL ASSESSMENT
PAIN_FUNCTIONAL_ASSESSMENT: NONE - DENIES PAIN
PAIN_FUNCTIONAL_ASSESSMENT: NONE - DENIES PAIN

## 2024-06-29 ASSESSMENT — PAIN SCALES - GENERAL: PAINLEVEL_OUTOF10: 8

## 2024-06-29 ASSESSMENT — PAIN DESCRIPTION - LOCATION: LOCATION: HEAD

## 2024-06-29 NOTE — ED NOTES
Assumed care of patient at this time. Patient transferred into hospital bed, seizure precautions in place. Patient tells this RN that she took her morning meds from her , gabapentin and Trileptal included. Patient educated not to take own medications when in hospital, we will provide them for her. Pt tearful over recent death of dog, emotional support provided. Call light within reach.  at bedside.

## 2024-06-29 NOTE — ED NOTES
Assumed care of patient. Introduced self to patient as RN. Patient complains of a headache 8/10 denies any blurred or double vision. Discussed with patient delay in transfer, ordered a inpatient bed for her for comfort. Emotional support given. Call light within reach.

## 2024-06-29 NOTE — ED PROVIDER NOTES
I have signed this patient out to the oncoming physician, Dr. Monet  I have discussed the patient's initial exam, treatment and plan of care with the on coming physician.  I have notified the patient / family of the change in treating physician and answered their questions to this point.      This patient has chosen to leave against medical advice.  I have personally explained to them that choosing to do so may result in permanent bodily harm or death.  I discussed at length that without further evaluation and monitoring there may be unforeseen circumstances and deterioration resulting in permanent bodily harm or death as a result of their choice.  They are alert, oriented, and competent at this time.  They state that they are aware of the serious risks as explained, but they continue to wish to leave against medical   advice.   In light of their decision to leave against medical advice, follow-up has been arranged and they are aware of the importance of following up as instructed.  They have been advised that they should return to the ED immediately if they change their mind at any time, or if their condition begins to change or worsen.         Casey Garza,   06/29/24 1908

## 2024-06-29 NOTE — ED NOTES
Lunch tray provided to patient at this time. Patient continues to be tearful about recent death in family, and her still being in the ER waiting for a neuro bed at [facility]. Patient up to restroom for 1 BM at this time. Gait steady. Returned to bed with seizure precautions in place, call light in reach.

## 2024-06-29 NOTE — ED NOTES
Lunch tray ordered for patient. Patient stating she feels fine, she is bored and wanting to go home. Says she never just sits in bed all day. Patient educated that if she hasn't had a seizure sine June of 2023, like she reports, she needs to be further evaluated and monitored by neurology.

## 2024-07-02 ENCOUNTER — TELEPHONE (OUTPATIENT)
Dept: PRIMARY CARE CLINIC | Age: 69
End: 2024-07-02

## 2024-07-02 DIAGNOSIS — R42 VERTIGO: ICD-10-CM

## 2024-07-02 LAB
EKG ATRIAL RATE: 95 BPM
EKG P AXIS: 51 DEGREES
EKG P-R INTERVAL: 144 MS
EKG Q-T INTERVAL: 356 MS
EKG QRS DURATION: 94 MS
EKG QTC CALCULATION (BAZETT): 447 MS
EKG R AXIS: 46 DEGREES
EKG T AXIS: 58 DEGREES
EKG VENTRICULAR RATE: 95 BPM

## 2024-07-02 NOTE — TELEPHONE ENCOUNTER
Patient left AMA from ED 6/28 after having dizziness and witnessed seizure. She was set to transfer to [facility] before leaving AMA. Is patient still having dizziness and/or has she had any additional seizures? If so, patient needs to return to ED ASAP. Office care would not be appropriate for these acute symptoms.

## 2024-07-03 RX ORDER — MECLIZINE HCL 12.5 MG/1
12.5 TABLET ORAL 3 TIMES DAILY PRN
Qty: 90 TABLET | Refills: 0 | Status: SHIPPED | OUTPATIENT
Start: 2024-07-03

## 2024-07-03 NOTE — TELEPHONE ENCOUNTER
Pt called in. She states she has not had any more dizzy spells or seizures.     She states she had a traumatic experience two days before going to the hospital. She was holding her two year old dachshund, he jumped out of her arms, got hurt and passed away. She feels responsible, she and her daughter(?) haven't been eating or sleeping since, and she believes that this experience is the cause of her dizzy spells.   She is requesting a refill for meclizine to have on hand.

## 2024-07-03 NOTE — TELEPHONE ENCOUNTER
Encourage hydration with electrolytes even if not eating well. Please see if she can come in Monday 7/8, double book 11am.

## 2024-07-08 ENCOUNTER — OFFICE VISIT (OUTPATIENT)
Dept: PRIMARY CARE CLINIC | Age: 69
End: 2024-07-08
Payer: COMMERCIAL

## 2024-07-08 VITALS
OXYGEN SATURATION: 97 % | BODY MASS INDEX: 25.43 KG/M2 | HEART RATE: 86 BPM | WEIGHT: 138.2 LBS | HEIGHT: 62 IN | TEMPERATURE: 98.1 F | DIASTOLIC BLOOD PRESSURE: 50 MMHG | SYSTOLIC BLOOD PRESSURE: 100 MMHG | RESPIRATION RATE: 18 BRPM

## 2024-07-08 DIAGNOSIS — E21.3 HYPERPARATHYROIDISM (HCC): ICD-10-CM

## 2024-07-08 DIAGNOSIS — R56.9 SEIZURE (HCC): Primary | ICD-10-CM

## 2024-07-08 DIAGNOSIS — G50.0 TRIGEMINAL NEURALGIA: ICD-10-CM

## 2024-07-08 DIAGNOSIS — D50.9 IRON DEFICIENCY ANEMIA, UNSPECIFIED IRON DEFICIENCY ANEMIA TYPE: ICD-10-CM

## 2024-07-08 DIAGNOSIS — G50.0 ANESTHESIA DOLOROSA IN DISTRIBUTION OF TRIGEMINAL NERVE: ICD-10-CM

## 2024-07-08 DIAGNOSIS — E87.1 HYPONATREMIA: ICD-10-CM

## 2024-07-08 DIAGNOSIS — M81.0 AGE-RELATED OSTEOPOROSIS WITHOUT CURRENT PATHOLOGICAL FRACTURE: ICD-10-CM

## 2024-07-08 DIAGNOSIS — E55.9 VITAMIN D DEFICIENCY: ICD-10-CM

## 2024-07-08 DIAGNOSIS — I10 PRIMARY HYPERTENSION: ICD-10-CM

## 2024-07-08 DIAGNOSIS — Z96.641 S/P TOTAL RIGHT HIP ARTHROPLASTY: ICD-10-CM

## 2024-07-08 DIAGNOSIS — K21.9 GASTROESOPHAGEAL REFLUX DISEASE, UNSPECIFIED WHETHER ESOPHAGITIS PRESENT: ICD-10-CM

## 2024-07-08 DIAGNOSIS — F51.04 PSYCHOPHYSIOLOGICAL INSOMNIA: ICD-10-CM

## 2024-07-08 DIAGNOSIS — K58.1 IRRITABLE BOWEL SYNDROME WITH CONSTIPATION: ICD-10-CM

## 2024-07-08 DIAGNOSIS — R42 VERTIGO: ICD-10-CM

## 2024-07-08 DIAGNOSIS — R11.0 NAUSEA: ICD-10-CM

## 2024-07-08 PROCEDURE — 3078F DIAST BP <80 MM HG: CPT | Performed by: STUDENT IN AN ORGANIZED HEALTH CARE EDUCATION/TRAINING PROGRAM

## 2024-07-08 PROCEDURE — 1090F PRES/ABSN URINE INCON ASSESS: CPT | Performed by: STUDENT IN AN ORGANIZED HEALTH CARE EDUCATION/TRAINING PROGRAM

## 2024-07-08 PROCEDURE — G8399 PT W/DXA RESULTS DOCUMENT: HCPCS | Performed by: STUDENT IN AN ORGANIZED HEALTH CARE EDUCATION/TRAINING PROGRAM

## 2024-07-08 PROCEDURE — 99214 OFFICE O/P EST MOD 30 MIN: CPT | Performed by: STUDENT IN AN ORGANIZED HEALTH CARE EDUCATION/TRAINING PROGRAM

## 2024-07-08 PROCEDURE — 3074F SYST BP LT 130 MM HG: CPT | Performed by: STUDENT IN AN ORGANIZED HEALTH CARE EDUCATION/TRAINING PROGRAM

## 2024-07-08 PROCEDURE — G8419 CALC BMI OUT NRM PARAM NOF/U: HCPCS | Performed by: STUDENT IN AN ORGANIZED HEALTH CARE EDUCATION/TRAINING PROGRAM

## 2024-07-08 PROCEDURE — 1124F ACP DISCUSS-NO DSCNMKR DOCD: CPT | Performed by: STUDENT IN AN ORGANIZED HEALTH CARE EDUCATION/TRAINING PROGRAM

## 2024-07-08 PROCEDURE — 3017F COLORECTAL CA SCREEN DOC REV: CPT | Performed by: STUDENT IN AN ORGANIZED HEALTH CARE EDUCATION/TRAINING PROGRAM

## 2024-07-08 PROCEDURE — G8427 DOCREV CUR MEDS BY ELIG CLIN: HCPCS | Performed by: STUDENT IN AN ORGANIZED HEALTH CARE EDUCATION/TRAINING PROGRAM

## 2024-07-08 PROCEDURE — 1036F TOBACCO NON-USER: CPT | Performed by: STUDENT IN AN ORGANIZED HEALTH CARE EDUCATION/TRAINING PROGRAM

## 2024-07-08 PROCEDURE — G2211 COMPLEX E/M VISIT ADD ON: HCPCS | Performed by: STUDENT IN AN ORGANIZED HEALTH CARE EDUCATION/TRAINING PROGRAM

## 2024-07-08 RX ORDER — MELOXICAM 15 MG/1
15 TABLET ORAL DAILY
Qty: 90 TABLET | Refills: 1 | Status: SHIPPED | OUTPATIENT
Start: 2024-07-08

## 2024-07-08 RX ORDER — ONDANSETRON 4 MG/1
4 TABLET, ORALLY DISINTEGRATING ORAL 3 TIMES DAILY PRN
Qty: 90 TABLET | Refills: 0 | Status: SHIPPED | OUTPATIENT
Start: 2024-07-08

## 2024-07-08 RX ORDER — LISINOPRIL 40 MG/1
40 TABLET ORAL DAILY
Qty: 90 TABLET | Refills: 0 | Status: CANCELLED | OUTPATIENT
Start: 2024-07-08

## 2024-07-08 RX ORDER — ACETAMINOPHEN 160 MG
1 TABLET,DISINTEGRATING ORAL DAILY
Qty: 90 CAPSULE | Refills: 1 | Status: SHIPPED | OUTPATIENT
Start: 2024-07-08

## 2024-07-08 RX ORDER — PANTOPRAZOLE SODIUM 40 MG/1
40 TABLET, DELAYED RELEASE ORAL EVERY MORNING
Qty: 90 TABLET | Refills: 1 | Status: SHIPPED | OUTPATIENT
Start: 2024-07-08

## 2024-07-08 RX ORDER — LISINOPRIL 20 MG/1
20 TABLET ORAL DAILY
Qty: 90 TABLET | Refills: 1 | Status: SHIPPED | OUTPATIENT
Start: 2024-07-08

## 2024-07-08 RX ORDER — TRAZODONE HYDROCHLORIDE 50 MG/1
50 TABLET ORAL NIGHTLY PRN
Qty: 90 TABLET | Refills: 1 | Status: SHIPPED | OUTPATIENT
Start: 2024-07-08

## 2024-07-08 RX ORDER — DICYCLOMINE HCL 20 MG
20 TABLET ORAL 4 TIMES DAILY PRN
Qty: 120 TABLET | Refills: 1 | Status: SHIPPED | OUTPATIENT
Start: 2024-07-08

## 2024-07-08 RX ORDER — AMLODIPINE BESYLATE 10 MG/1
10 TABLET ORAL DAILY
Qty: 90 TABLET | Refills: 1 | Status: SHIPPED | OUTPATIENT
Start: 2024-07-08

## 2024-07-08 SDOH — ECONOMIC STABILITY: FOOD INSECURITY: WITHIN THE PAST 12 MONTHS, YOU WORRIED THAT YOUR FOOD WOULD RUN OUT BEFORE YOU GOT MONEY TO BUY MORE.: NEVER TRUE

## 2024-07-08 SDOH — ECONOMIC STABILITY: FOOD INSECURITY: WITHIN THE PAST 12 MONTHS, THE FOOD YOU BOUGHT JUST DIDN'T LAST AND YOU DIDN'T HAVE MONEY TO GET MORE.: NEVER TRUE

## 2024-07-08 SDOH — ECONOMIC STABILITY: INCOME INSECURITY: HOW HARD IS IT FOR YOU TO PAY FOR THE VERY BASICS LIKE FOOD, HOUSING, MEDICAL CARE, AND HEATING?: NOT HARD AT ALL

## 2024-07-08 ASSESSMENT — PATIENT HEALTH QUESTIONNAIRE - PHQ9
SUM OF ALL RESPONSES TO PHQ QUESTIONS 1-9: 7
10. IF YOU CHECKED OFF ANY PROBLEMS, HOW DIFFICULT HAVE THESE PROBLEMS MADE IT FOR YOU TO DO YOUR WORK, TAKE CARE OF THINGS AT HOME, OR GET ALONG WITH OTHER PEOPLE: NOT DIFFICULT AT ALL
8. MOVING OR SPEAKING SO SLOWLY THAT OTHER PEOPLE COULD HAVE NOTICED. OR THE OPPOSITE, BEING SO FIGETY OR RESTLESS THAT YOU HAVE BEEN MOVING AROUND A LOT MORE THAN USUAL: NOT AT ALL
SUM OF ALL RESPONSES TO PHQ9 QUESTIONS 1 & 2: 1
SUM OF ALL RESPONSES TO PHQ QUESTIONS 1-9: 7
9. THOUGHTS THAT YOU WOULD BE BETTER OFF DEAD, OR OF HURTING YOURSELF: NOT AT ALL
2. FEELING DOWN, DEPRESSED OR HOPELESS: SEVERAL DAYS
6. FEELING BAD ABOUT YOURSELF - OR THAT YOU ARE A FAILURE OR HAVE LET YOURSELF OR YOUR FAMILY DOWN: NOT AT ALL
5. POOR APPETITE OR OVEREATING: NEARLY EVERY DAY
7. TROUBLE CONCENTRATING ON THINGS, SUCH AS READING THE NEWSPAPER OR WATCHING TELEVISION: NOT AT ALL
3. TROUBLE FALLING OR STAYING ASLEEP: NEARLY EVERY DAY
SUM OF ALL RESPONSES TO PHQ QUESTIONS 1-9: 7
SUM OF ALL RESPONSES TO PHQ QUESTIONS 1-9: 7
4. FEELING TIRED OR HAVING LITTLE ENERGY: NOT AT ALL
1. LITTLE INTEREST OR PLEASURE IN DOING THINGS: NOT AT ALL

## 2024-07-08 NOTE — PROGRESS NOTES
ESTABLISHED PRIMARY CARE VISIT    24  Name: Melly Ga   : 1955   Age: 68 y.o.  Sex: female        Assessment & Plan:     Problem List Items Addressed This Visit       Trigeminal neuralgia     Chronic, uncontrolled  Referral again to neurology  Continue oxcarbazepine 600 mg 3 times daily, gabapentin 800 mg 3 times daily         Relevant Medications    traZODone (DESYREL) 50 MG tablet    Other Relevant Orders    External Referral To Neurology    Hyponatremia     Chronic, intermittent, mild  Likely secondary to oxcarbazepine, lisinopril  Recheck         Relevant Orders    Basic Metabolic Panel (Completed)    External Referral To Neurology    GERD (gastroesophageal reflux disease)     Continue Protonix 40 mg daily         Relevant Medications    pantoprazole (PROTONIX) 40 MG tablet    dicyclomine (BENTYL) 20 MG tablet    ondansetron (ZOFRAN-ODT) 4 MG disintegrating tablet    Seizure (HCC) - Primary     Chronic, uncontrolled with recent episode in ED  Associated with trigeminal neuralgia per patient  Referral again to neurology  Continue oxcarbazepine 600 mg 3 times daily, gabapentin 800 mg 3 times daily         Relevant Orders    External Referral To Neurology    IBS (irritable bowel syndrome)     Continue Bentyl as needed         Relevant Medications    pantoprazole (PROTONIX) 40 MG tablet    dicyclomine (BENTYL) 20 MG tablet    ondansetron (ZOFRAN-ODT) 4 MG disintegrating tablet    Primary hypertension     Chronic, over-controlled  Reduce lisinopril 20 mg daily given intermittent hyponatremia  Continue amlodipine 10 mg daily         Relevant Medications    amLODIPine (NORVASC) 10 MG tablet    lisinopril (PRINIVIL;ZESTRIL) 20 MG tablet    Other Relevant Orders    Basic Metabolic Panel (Completed)    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

## 2024-07-08 NOTE — PATIENT INSTRUCTIONS
[facility]  [facility] [place] [address]   Telephone: [phone] or [phone]

## 2024-07-12 DIAGNOSIS — D50.9 IRON DEFICIENCY ANEMIA, UNSPECIFIED IRON DEFICIENCY ANEMIA TYPE: ICD-10-CM

## 2024-07-12 DIAGNOSIS — I10 PRIMARY HYPERTENSION: ICD-10-CM

## 2024-07-12 DIAGNOSIS — E87.1 HYPONATREMIA: ICD-10-CM

## 2024-07-12 DIAGNOSIS — E55.9 VITAMIN D DEFICIENCY: ICD-10-CM

## 2024-07-12 LAB
ANION GAP SERPL CALCULATED.3IONS-SCNC: 14 MMOL/L (ref 7–16)
BUN BLDV-MCNC: 12 MG/DL (ref 6–23)
CALCIUM SERPL-MCNC: 8.9 MG/DL (ref 8.6–10.2)
CHLORIDE BLD-SCNC: 97 MMOL/L (ref 98–107)
CO2: 21 MMOL/L (ref 22–29)
CREAT SERPL-MCNC: 0.5 MG/DL (ref 0.5–1)
FERRITIN: 36 NG/ML
GFR, ESTIMATED: >90 ML/MIN/1.73M2
GLUCOSE BLD-MCNC: 95 MG/DL (ref 74–99)
IRON % SATURATION: 27 % (ref 15–50)
IRON: 77 UG/DL (ref 37–145)
POTASSIUM SERPL-SCNC: 4.7 MMOL/L (ref 3.5–5)
SODIUM BLD-SCNC: 132 MMOL/L (ref 132–146)
TOTAL IRON BINDING CAPACITY: 283 UG/DL (ref 250–450)
VITAMIN D 25-HYDROXY: 33.1 NG/ML (ref 30–100)

## 2024-07-15 PROBLEM — M16.11 PRIMARY OSTEOARTHRITIS OF RIGHT HIP: Status: RESOLVED | Noted: 2023-05-01 | Resolved: 2024-07-15

## 2024-07-15 ASSESSMENT — ENCOUNTER SYMPTOMS
VOMITING: 0
NAUSEA: 1
CONSTIPATION: 0
DIARRHEA: 0
SHORTNESS OF BREATH: 0

## 2024-07-15 NOTE — ASSESSMENT & PLAN NOTE
Chronic, over-controlled  Reduce lisinopril 20 mg daily given intermittent hyponatremia  Continue amlodipine 10 mg daily

## 2024-07-15 NOTE — ASSESSMENT & PLAN NOTE
Chronic, uncontrolled  Referral again to neurology  Continue oxcarbazepine 600 mg 3 times daily, gabapentin 800 mg 3 times daily

## 2024-07-15 NOTE — ASSESSMENT & PLAN NOTE
Previous PTH elevated, recheck  Also has chronic GI issues  Referred to endo to determine best therapy however patient did not schedule  Readdress after neuro

## 2024-07-15 NOTE — ASSESSMENT & PLAN NOTE
Chronic, uncontrolled with recent episode in ED  Associated with trigeminal neuralgia per patient  Referral to new neurology  Continue oxcarbazepine 600 mg 3 times daily, gabapentin 800 mg 3 times daily

## 2024-07-15 NOTE — ASSESSMENT & PLAN NOTE
Chronic, intermittent, likely secondary to trigeminal neuralgia  Worsened by recent episode of poor oral intake and mild dehydration with hyponatremia  Referral again to neurology  Continue oxcarbazepine 600 mg 3 times daily, gabapentin 800 mg 3 times daily  Continue meclizine as needed

## 2024-07-25 DIAGNOSIS — R56.9 SEIZURE (HCC): ICD-10-CM

## 2024-07-25 DIAGNOSIS — I10 PRIMARY HYPERTENSION: ICD-10-CM

## 2024-07-25 DIAGNOSIS — G50.0 ANESTHESIA DOLOROSA IN DISTRIBUTION OF TRIGEMINAL NERVE: ICD-10-CM

## 2024-07-25 DIAGNOSIS — G50.0 TRIGEMINAL NEURALGIA: ICD-10-CM

## 2024-07-25 RX ORDER — LISINOPRIL 40 MG/1
40 TABLET ORAL DAILY
Qty: 90 TABLET | Refills: 0 | OUTPATIENT
Start: 2024-07-25

## 2024-07-25 RX ORDER — OXCARBAZEPINE 600 MG/1
600 TABLET, FILM COATED ORAL 3 TIMES DAILY
Qty: 270 TABLET | Refills: 0 | Status: SHIPPED | OUTPATIENT
Start: 2024-07-25

## 2024-07-25 NOTE — TELEPHONE ENCOUNTER
Last Appointment:  7/8/2024  Future Appointments   Date Time Provider Department Center   9/25/2024 11:30 AM Richie Lozoya MD [place] [facility]   10/9/2024  2:00 PM Richie Lozoya MD [place] [facility]   10/9/2024  2:30 PM Richie Lozoya MD [place] [facility]

## 2025-01-08 DIAGNOSIS — I10 PRIMARY HYPERTENSION: ICD-10-CM

## 2025-01-08 RX ORDER — AMLODIPINE BESYLATE 10 MG/1
10 TABLET ORAL DAILY
Qty: 90 TABLET | Refills: 1 | Status: SHIPPED | OUTPATIENT
Start: 2025-01-08

## 2025-01-08 NOTE — TELEPHONE ENCOUNTER
Name of Medication(s) Requested:  Requested Prescriptions     Pending Prescriptions Disp Refills    amLODIPine (NORVASC) 10 MG tablet [Pharmacy Med Name: AMLODIPINE BESYLATE 10MG TABLETS] 90 tablet 1     Sig: TAKE 1 TABLET BY MOUTH DAILY       Medication is on current medication list Yes    Dosage and directions were verified? Yes    Quantity verified: 90 day supply     Pharmacy Verified?  Yes    Last Appointment:  7/8/2024    Future appts:  Future Appointments   Date Time Provider Department Center   2/3/2025 12:30 PM Edmar Smith MD [place] [facility]        (If no appt send self scheduling link. .REFILLAPPT)  Scheduling request sent?     [] Yes  [x] No    Does patient need updated?  [] Yes  [x] No

## 2025-01-22 DIAGNOSIS — G50.0 TRIGEMINAL NEURALGIA: ICD-10-CM

## 2025-01-22 DIAGNOSIS — E55.9 VITAMIN D DEFICIENCY: ICD-10-CM

## 2025-01-22 DIAGNOSIS — M81.0 AGE-RELATED OSTEOPOROSIS WITHOUT CURRENT PATHOLOGICAL FRACTURE: ICD-10-CM

## 2025-01-22 DIAGNOSIS — G50.0 ANESTHESIA DOLOROSA IN DISTRIBUTION OF TRIGEMINAL NERVE: ICD-10-CM

## 2025-01-22 DIAGNOSIS — R56.9 SEIZURE (HCC): ICD-10-CM

## 2025-01-22 NOTE — TELEPHONE ENCOUNTER
Name of Medication(s) Requested:  Requested Prescriptions     Pending Prescriptions Disp Refills    gabapentin (NEURONTIN) 800 MG tablet [Pharmacy Med Name: GABAPENTIN 800MG TABLETS] 270 tablet 1     Sig: Take 1 tablet by mouth 3 times daily.    OXcarbazepine (TRILEPTAL) 600 MG tablet [Pharmacy Med Name: OXCARBAZEPINE 600MG TABLETS] 270 tablet 0     Sig: TAKE 1 TABLET BY MOUTH THREE TIMES DAILY    VITAMIN D3 50 MCG (2000 UT) CAPS capsule [Pharmacy Med Name: VITAMIN D3 2,000UNIT CAPSULES] 90 capsule 1     Sig: TAKE 1 CAPSULE BY MOUTH EVERY DAY       Medication is on current medication list Yes    Dosage and directions were verified? Yes    Quantity verified: 90 day supply     Pharmacy Verified?  Yes    Last Appointment:  7/8/2024    Future appts:  Future Appointments   Date Time Provider Department Center   2/3/2025 12:30 PM Edmar Simth MD [place] [facility]        (If no appt send self scheduling link. .REFILLAPPT)  Scheduling request sent?     [] Yes  [x] No    Does patient need updated?  [] Yes  [x] No

## 2025-01-24 RX ORDER — ACETAMINOPHEN 160 MG
TABLET,DISINTEGRATING ORAL DAILY
Qty: 90 CAPSULE | Refills: 1 | Status: SHIPPED | OUTPATIENT
Start: 2025-01-24

## 2025-01-24 RX ORDER — GABAPENTIN 800 MG/1
800 TABLET ORAL 3 TIMES DAILY
Qty: 270 TABLET | Refills: 1 | Status: SHIPPED | OUTPATIENT
Start: 2025-01-24 | End: 2025-07-23

## 2025-01-24 RX ORDER — OXCARBAZEPINE 600 MG/1
600 TABLET, FILM COATED ORAL 3 TIMES DAILY
Qty: 270 TABLET | Refills: 0 | Status: SHIPPED | OUTPATIENT
Start: 2025-01-24

## 2025-02-03 ENCOUNTER — OFFICE VISIT (OUTPATIENT)
Dept: PRIMARY CARE CLINIC | Age: 70
End: 2025-02-03

## 2025-02-03 VITALS
HEIGHT: 62 IN | TEMPERATURE: 98.3 F | BODY MASS INDEX: 26.13 KG/M2 | DIASTOLIC BLOOD PRESSURE: 70 MMHG | WEIGHT: 142 LBS | HEART RATE: 91 BPM | OXYGEN SATURATION: 97 % | RESPIRATION RATE: 18 BRPM | SYSTOLIC BLOOD PRESSURE: 130 MMHG

## 2025-02-03 DIAGNOSIS — Z00.00 ENCOUNTER FOR WELL ADULT EXAM WITHOUT ABNORMAL FINDINGS: Primary | ICD-10-CM

## 2025-02-03 DIAGNOSIS — G50.0 ANESTHESIA DOLOROSA IN DISTRIBUTION OF TRIGEMINAL NERVE: ICD-10-CM

## 2025-02-03 DIAGNOSIS — Z12.11 SCREENING FOR COLORECTAL CANCER: ICD-10-CM

## 2025-02-03 DIAGNOSIS — Z12.12 SCREENING FOR COLORECTAL CANCER: ICD-10-CM

## 2025-02-03 DIAGNOSIS — G50.0 TRIGEMINAL NEURALGIA: ICD-10-CM

## 2025-02-03 DIAGNOSIS — I10 PRIMARY HYPERTENSION: ICD-10-CM

## 2025-02-03 DIAGNOSIS — R42 VERTIGO: ICD-10-CM

## 2025-02-03 DIAGNOSIS — Z00.00 ENCOUNTER FOR WELL ADULT EXAM WITHOUT ABNORMAL FINDINGS: ICD-10-CM

## 2025-02-03 DIAGNOSIS — K58.1 IRRITABLE BOWEL SYNDROME WITH CONSTIPATION: ICD-10-CM

## 2025-02-03 LAB
ALBUMIN: 4.4 G/DL (ref 3.5–5.2)
ALP BLD-CCNC: 100 U/L (ref 35–104)
ALT SERPL-CCNC: 10 U/L (ref 0–32)
ANION GAP SERPL CALCULATED.3IONS-SCNC: 14 MMOL/L (ref 7–16)
AST SERPL-CCNC: 18 U/L (ref 0–31)
BILIRUB SERPL-MCNC: 0.2 MG/DL (ref 0–1.2)
BUN BLDV-MCNC: 20 MG/DL (ref 6–23)
CALCIUM SERPL-MCNC: 9.2 MG/DL (ref 8.6–10.2)
CHLORIDE BLD-SCNC: 97 MMOL/L (ref 98–107)
CHOLESTEROL, TOTAL: 210 MG/DL
CO2: 21 MMOL/L (ref 22–29)
CREAT SERPL-MCNC: 0.6 MG/DL (ref 0.5–1)
GFR, ESTIMATED: >90 ML/MIN/1.73M2
GLUCOSE BLD-MCNC: 101 MG/DL (ref 74–99)
HCT VFR BLD CALC: 33.8 % (ref 34–48)
HDLC SERPL-MCNC: 66 MG/DL
HEMOGLOBIN: 11.2 G/DL (ref 11.5–15.5)
LDL CHOLESTEROL: 128 MG/DL
MCH RBC QN AUTO: 30.4 PG (ref 26–35)
MCHC RBC AUTO-ENTMCNC: 33.1 G/DL (ref 32–34.5)
MCV RBC AUTO: 91.6 FL (ref 80–99.9)
PDW BLD-RTO: 12.5 % (ref 11.5–15)
PLATELET # BLD: 350 K/UL (ref 130–450)
PMV BLD AUTO: 9.6 FL (ref 7–12)
POTASSIUM SERPL-SCNC: 4.3 MMOL/L (ref 3.5–5)
RBC # BLD: 3.69 M/UL (ref 3.5–5.5)
SODIUM BLD-SCNC: 132 MMOL/L (ref 132–146)
TOTAL PROTEIN: 7.5 G/DL (ref 6.4–8.3)
TRIGL SERPL-MCNC: 81 MG/DL
VLDLC SERPL CALC-MCNC: 16 MG/DL
WBC # BLD: 6.7 K/UL (ref 4.5–11.5)

## 2025-02-03 RX ORDER — MECLIZINE HCL 12.5 MG 12.5 MG/1
12.5 TABLET ORAL 3 TIMES DAILY PRN
Qty: 90 TABLET | Refills: 0 | Status: SHIPPED | OUTPATIENT
Start: 2025-02-03

## 2025-02-03 RX ORDER — LISINOPRIL 20 MG/1
20 TABLET ORAL DAILY
Qty: 90 TABLET | Refills: 1 | Status: SHIPPED | OUTPATIENT
Start: 2025-02-03

## 2025-02-03 RX ORDER — POLYETHYLENE GLYCOL 3350 17 G/17G
17 POWDER, FOR SOLUTION ORAL DAILY
Qty: 1530 G | Refills: 1 | Status: SHIPPED | OUTPATIENT
Start: 2025-02-03

## 2025-02-03 RX ORDER — BACLOFEN 10 MG/1
10 TABLET ORAL 3 TIMES DAILY PRN
Qty: 90 TABLET | Refills: 2 | Status: SHIPPED | OUTPATIENT
Start: 2025-02-03

## 2025-02-03 ASSESSMENT — ENCOUNTER SYMPTOMS
VOMITING: 0
WHEEZING: 0
ABDOMINAL PAIN: 0
NAUSEA: 0
CONSTIPATION: 0
SHORTNESS OF BREATH: 0

## 2025-02-03 ASSESSMENT — PATIENT HEALTH QUESTIONNAIRE - PHQ9
2. FEELING DOWN, DEPRESSED OR HOPELESS: NOT AT ALL
SUM OF ALL RESPONSES TO PHQ QUESTIONS 1-9: 0
1. LITTLE INTEREST OR PLEASURE IN DOING THINGS: NOT AT ALL
4. FEELING TIRED OR HAVING LITTLE ENERGY: NOT AT ALL
5. POOR APPETITE OR OVEREATING: NOT AT ALL
9. THOUGHTS THAT YOU WOULD BE BETTER OFF DEAD, OR OF HURTING YOURSELF: NOT AT ALL
SUM OF ALL RESPONSES TO PHQ QUESTIONS 1-9: 0
7. TROUBLE CONCENTRATING ON THINGS, SUCH AS READING THE NEWSPAPER OR WATCHING TELEVISION: NOT AT ALL
6. FEELING BAD ABOUT YOURSELF - OR THAT YOU ARE A FAILURE OR HAVE LET YOURSELF OR YOUR FAMILY DOWN: NOT AT ALL
10. IF YOU CHECKED OFF ANY PROBLEMS, HOW DIFFICULT HAVE THESE PROBLEMS MADE IT FOR YOU TO DO YOUR WORK, TAKE CARE OF THINGS AT HOME, OR GET ALONG WITH OTHER PEOPLE: NOT DIFFICULT AT ALL
SUM OF ALL RESPONSES TO PHQ QUESTIONS 1-9: 0
3. TROUBLE FALLING OR STAYING ASLEEP: NOT AT ALL
8. MOVING OR SPEAKING SO SLOWLY THAT OTHER PEOPLE COULD HAVE NOTICED. OR THE OPPOSITE, BEING SO FIGETY OR RESTLESS THAT YOU HAVE BEEN MOVING AROUND A LOT MORE THAN USUAL: NOT AT ALL
SUM OF ALL RESPONSES TO PHQ QUESTIONS 1-9: 0
SUM OF ALL RESPONSES TO PHQ9 QUESTIONS 1 & 2: 0

## 2025-02-03 NOTE — PROGRESS NOTES
Well Adult Note  Name: Melly Ga Today’s Date: 2/3/2025   MRN: [id number] Sex: Female   Age: 69 y.o. Ethnicity: Non- / Non    : 1955 Race: White (non-)      Melly Ga is here for a well adult exam.        1. Encounter for well adult exam without abnormal findings  -     Lipid Panel; Future  2. Primary hypertension  Comments:  Stable.  Continue Norvasc 10 mg daily and lisinopril 20 daily  Orders:  -     lisinopril (PRINIVIL;ZESTRIL) 20 MG tablet; Take 1 tablet by mouth daily, Disp-90 tablet, R-1Normal  -     CBC; Future  -     Comprehensive Metabolic Panel; Future  3. Vertigo  Comments:  Worsening symptoms.  Likely secondary to flareups of trigeminal neuralgia.  Continue Antivert 12.5 mg as needed.  Orders:  -     meclizine (ANTIVERT) 12.5 MG tablet; Take 1 tablet by mouth 3 times daily as needed for Dizziness, Disp-90 tablet, R-0Normal  4. Trigeminal neuralgia  Comments:  Not at goal.  Advised to continue gabapentin 800 mg 3 times daily and Trileptal 600 mg 3 times daily.  Orders:  -     baclofen (LIORESAL) 10 MG tablet; Take 1 tablet by mouth 3 times daily as needed (muscle spasm), Disp-90 tablet, R-2Normal  5. 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 Advised to continue gabapentin 800 mg 3 times daily and Trileptal 600 mg 3 times daily.  Orders:  -     baclofen (LIORESAL) 10 MG tablet; Take 1 tablet by mouth 3 times daily as needed (muscle spasm), Disp-90 tablet, R-2Normal  6. Irritable bowel syndrome with constipation  Comments:  stable. continue Miralax.  Orders:  -     polyethylene glycol (GLYCOLAX) 17 GM/SCOOP powder; Take 17 g by mouth daily, Disp-1530 g, R-1Normal  7. Screening for colorectal cancer  Comments:  will order cologuard.  Orders:  -     Cologuard (Fecal DNA Colorectal Cancer Screening)     Results       No follow-ups on file.     Subjective   History of Present Illness  The patient is a 66-year-old female who presents

## 2025-04-09 DIAGNOSIS — Z96.641 S/P TOTAL RIGHT HIP ARTHROPLASTY: ICD-10-CM

## 2025-04-09 DIAGNOSIS — K21.9 GASTROESOPHAGEAL REFLUX DISEASE, UNSPECIFIED WHETHER ESOPHAGITIS PRESENT: ICD-10-CM

## 2025-04-09 DIAGNOSIS — F51.04 PSYCHOPHYSIOLOGICAL INSOMNIA: ICD-10-CM

## 2025-04-09 RX ORDER — TRAZODONE HYDROCHLORIDE 50 MG/1
50 TABLET ORAL NIGHTLY PRN
Qty: 90 TABLET | Refills: 1 | Status: SHIPPED | OUTPATIENT
Start: 2025-04-09

## 2025-04-09 RX ORDER — MELOXICAM 15 MG/1
15 TABLET ORAL DAILY
Qty: 90 TABLET | Refills: 1 | Status: SHIPPED | OUTPATIENT
Start: 2025-04-09

## 2025-04-09 RX ORDER — PANTOPRAZOLE SODIUM 40 MG/1
40 TABLET, DELAYED RELEASE ORAL EVERY MORNING
Qty: 90 TABLET | Refills: 1 | Status: SHIPPED | OUTPATIENT
Start: 2025-04-09

## 2025-04-09 NOTE — TELEPHONE ENCOUNTER
Name of Medication(s) Requested:  Requested Prescriptions     Pending Prescriptions Disp Refills    traZODone (DESYREL) 50 MG tablet [Pharmacy Med Name: TRAZODONE 50MG TABLETS] 90 tablet 1     Sig: TAKE 1 TABLET BY MOUTH EVERY NIGHT AS NEEDED FOR SLEEP    pantoprazole (PROTONIX) 40 MG tablet [Pharmacy Med Name: PANTOPRAZOLE 40MG TABLETS] 90 tablet 1     Sig: TAKE 1 TABLET BY MOUTH EVERY MORNING    meloxicam (MOBIC) 15 MG tablet [Pharmacy Med Name: MELOXICAM 15MG TABLETS] 90 tablet 1     Sig: TAKE 1 TABLET BY MOUTH DAILY WITH FOOD       Medication is on current medication list Yes    Dosage and directions were verified? Yes    Quantity verified: 90 day supply     Pharmacy Verified?  Yes    Last Appointment:  7/8/2024    Future appts:  Future Appointments   Date Time Provider Department Center   8/4/2025 12:30 PM Richie Lozoya MD [facility]        (If no appt send self scheduling link. .REFILLAPPT)  Scheduling request sent?     [] Yes  [x] No    Does patient need updated?  [] Yes  [x] No

## 2025-04-23 DIAGNOSIS — R56.9 SEIZURE (HCC): ICD-10-CM

## 2025-04-23 DIAGNOSIS — G50.0 TRIGEMINAL NEURALGIA: ICD-10-CM

## 2025-04-23 DIAGNOSIS — G50.0 ANESTHESIA DOLOROSA IN DISTRIBUTION OF TRIGEMINAL NERVE: ICD-10-CM

## 2025-04-23 RX ORDER — OXCARBAZEPINE 600 MG/1
600 TABLET, FILM COATED ORAL 3 TIMES DAILY
Qty: 270 TABLET | Refills: 0 | Status: SHIPPED | OUTPATIENT
Start: 2025-04-23

## 2025-04-23 NOTE — TELEPHONE ENCOUNTER
Name of Medication(s) Requested:  Requested Prescriptions     Pending Prescriptions Disp Refills    OXcarbazepine (TRILEPTAL) 600 MG tablet [Pharmacy Med Name: OXCARBAZEPINE 600MG TABLETS] 270 tablet 0     Sig: TAKE 1 TABLET BY MOUTH THREE TIMES DAILY       Medication is on current medication list Yes    Dosage and directions were verified? Yes    Quantity verified: 90 day supply     Pharmacy Verified?  Yes    Last Appointment:  2/3/2025    Future appts:  Future Appointments   Date Time Provider Department Center   8/4/2025 12:30 PM Richie Lozoya MD [facility] DEP        (If no appt send self scheduling link. .REFILLAPPT)  Scheduling request sent?     [] Yes  [x] No    Does patient need updated?  [] Yes  [x] No

## 2025-05-08 ENCOUNTER — APPOINTMENT (OUTPATIENT)
Dept: CT IMAGING | Age: 70
End: 2025-05-08
Payer: MEDICARE

## 2025-05-08 ENCOUNTER — OFFICE VISIT (OUTPATIENT)
Dept: FAMILY MEDICINE CLINIC | Age: 70
End: 2025-05-08
Payer: MEDICARE

## 2025-05-08 ENCOUNTER — HOSPITAL ENCOUNTER (EMERGENCY)
Age: 70
Discharge: HOME | End: 2025-05-09
Attending: EMERGENCY MEDICINE
Payer: MEDICARE

## 2025-05-08 VITALS
BODY MASS INDEX: 26.13 KG/M2 | SYSTOLIC BLOOD PRESSURE: 212 MMHG | DIASTOLIC BLOOD PRESSURE: 98 MMHG | TEMPERATURE: 97.9 F | HEART RATE: 126 BPM | OXYGEN SATURATION: 97 % | WEIGHT: 142 LBS | RESPIRATION RATE: 16 BRPM | HEIGHT: 62 IN

## 2025-05-08 DIAGNOSIS — R09.81 SINUS CONGESTION: ICD-10-CM

## 2025-05-08 DIAGNOSIS — G50.0 TRIGEMINAL NEURALGIA OF LEFT SIDE OF FACE: ICD-10-CM

## 2025-05-08 DIAGNOSIS — H65.92 OTITIS MEDIA, SEROUS, TM RUPTURE, LEFT: Primary | ICD-10-CM

## 2025-05-08 DIAGNOSIS — B37.9 ANTIBIOTIC-INDUCED YEAST INFECTION: ICD-10-CM

## 2025-05-08 DIAGNOSIS — I16.1 HYPERTENSIVE EMERGENCY: Primary | ICD-10-CM

## 2025-05-08 DIAGNOSIS — H66.93 ACUTE BILATERAL OTITIS MEDIA: ICD-10-CM

## 2025-05-08 DIAGNOSIS — T36.95XA ANTIBIOTIC-INDUCED YEAST INFECTION: ICD-10-CM

## 2025-05-08 DIAGNOSIS — R11.2 NAUSEA AND VOMITING, UNSPECIFIED VOMITING TYPE: ICD-10-CM

## 2025-05-08 DIAGNOSIS — H72.92 OTITIS MEDIA, SEROUS, TM RUPTURE, LEFT: Primary | ICD-10-CM

## 2025-05-08 DIAGNOSIS — R45.851 SUICIDAL THOUGHTS: ICD-10-CM

## 2025-05-08 LAB
ALBUMIN SERPL-MCNC: 4.3 G/DL (ref 3.5–5.2)
ALP SERPL-CCNC: 122 U/L (ref 35–104)
ALT SERPL-CCNC: 10 U/L (ref 0–32)
ANION GAP SERPL CALCULATED.3IONS-SCNC: 22 MMOL/L (ref 7–16)
AST SERPL-CCNC: 22 U/L (ref 0–31)
BACTERIA URNS QL MICRO: ABNORMAL
BASOPHILS # BLD: 0.02 K/UL (ref 0–0.2)
BASOPHILS NFR BLD: 0 % (ref 0–2)
BILIRUB SERPL-MCNC: 0.3 MG/DL (ref 0–1.2)
BILIRUB UR QL STRIP: NEGATIVE
BUN SERPL-MCNC: 15 MG/DL (ref 6–23)
CALCIUM SERPL-MCNC: 9.6 MG/DL (ref 8.6–10.2)
CASTS #/AREA URNS LPF: ABNORMAL /LPF
CHLORIDE SERPL-SCNC: 96 MMOL/L (ref 98–107)
CLARITY UR: CLEAR
CO2 SERPL-SCNC: 20 MMOL/L (ref 22–29)
COLOR UR: YELLOW
CREAT SERPL-MCNC: 0.5 MG/DL (ref 0.5–1)
EOSINOPHIL # BLD: 0.01 K/UL (ref 0.05–0.5)
EOSINOPHILS RELATIVE PERCENT: 0 % (ref 0–6)
EPI CELLS #/AREA URNS HPF: ABNORMAL /HPF
ERYTHROCYTE [DISTWIDTH] IN BLOOD BY AUTOMATED COUNT: 12.2 % (ref 11.5–15)
GFR, ESTIMATED: >90 ML/MIN/1.73M2
GLUCOSE SERPL-MCNC: 115 MG/DL (ref 74–99)
GLUCOSE UR STRIP-MCNC: NEGATIVE MG/DL
HCT VFR BLD AUTO: 33.8 % (ref 34–48)
HGB BLD-MCNC: 11.2 G/DL (ref 11.5–15.5)
HGB UR QL STRIP.AUTO: ABNORMAL
IMM GRANULOCYTES # BLD AUTO: 0.04 K/UL (ref 0–0.58)
IMM GRANULOCYTES NFR BLD: 0 % (ref 0–5)
INFLUENZA A ANTIBODY: NEGATIVE
INFLUENZA B ANTIBODY: NEGATIVE
KETONES UR STRIP-MCNC: >80 MG/DL
LACTATE BLDV-SCNC: 0.6 MMOL/L (ref 0.5–2.2)
LEUKOCYTE ESTERASE UR QL STRIP: NEGATIVE
LIPASE SERPL-CCNC: 14 U/L (ref 13–60)
LYMPHOCYTES NFR BLD: 1.44 K/UL (ref 1.5–4)
LYMPHOCYTES RELATIVE PERCENT: 16 % (ref 20–42)
Lab: NORMAL
MCH RBC QN AUTO: 30.7 PG (ref 26–35)
MCHC RBC AUTO-ENTMCNC: 33.1 G/DL (ref 32–34.5)
MCV RBC AUTO: 92.6 FL (ref 80–99.9)
MONOCYTES NFR BLD: 0.68 K/UL (ref 0.1–0.95)
MONOCYTES NFR BLD: 7 % (ref 2–12)
MUCOUS THREADS URNS QL MICRO: PRESENT
NEUTROPHILS NFR BLD: 76 % (ref 43–80)
NEUTS SEG NFR BLD: 7.06 K/UL (ref 1.8–7.3)
NITRITE UR QL STRIP: NEGATIVE
PERFORMING INSTRUMENT: NORMAL
PH UR STRIP: 6 [PH] (ref 5–8)
PLATELET # BLD AUTO: 293 K/UL (ref 130–450)
PMV BLD AUTO: 9.2 FL (ref 7–12)
POTASSIUM SERPL-SCNC: 4.3 MMOL/L (ref 3.5–5)
PROT SERPL-MCNC: 8 G/DL (ref 6.4–8.3)
PROT UR STRIP-MCNC: 30 MG/DL
QC PASS/FAIL: NORMAL
RBC # BLD AUTO: 3.65 M/UL (ref 3.5–5.5)
RBC #/AREA URNS HPF: ABNORMAL /HPF
SARS-COV-2, POC: NORMAL
SODIUM SERPL-SCNC: 138 MMOL/L (ref 132–146)
SP GR UR STRIP: 1.02 (ref 1–1.03)
TROPONIN I SERPL HS-MCNC: 9 NG/L (ref 0–14)
UROBILINOGEN UR STRIP-ACNC: 0.2 EU/DL (ref 0–1)
WBC #/AREA URNS HPF: ABNORMAL /HPF
WBC OTHER # BLD: 9.3 K/UL (ref 4.5–11.5)

## 2025-05-08 PROCEDURE — 2580000003 HC RX 258

## 2025-05-08 PROCEDURE — 81001 URINALYSIS AUTO W/SCOPE: CPT

## 2025-05-08 PROCEDURE — G8427 DOCREV CUR MEDS BY ELIG CLIN: HCPCS | Performed by: NURSE PRACTITIONER

## 2025-05-08 PROCEDURE — 87426 SARSCOV CORONAVIRUS AG IA: CPT | Performed by: NURSE PRACTITIONER

## 2025-05-08 PROCEDURE — 85025 COMPLETE CBC W/AUTO DIFF WBC: CPT

## 2025-05-08 PROCEDURE — 6360000002 HC RX W HCPCS

## 2025-05-08 PROCEDURE — 3077F SYST BP >= 140 MM HG: CPT | Performed by: NURSE PRACTITIONER

## 2025-05-08 PROCEDURE — 2580000003 HC RX 258: Performed by: EMERGENCY MEDICINE

## 2025-05-08 PROCEDURE — 83690 ASSAY OF LIPASE: CPT

## 2025-05-08 PROCEDURE — 93005 ELECTROCARDIOGRAM TRACING: CPT

## 2025-05-08 PROCEDURE — 1159F MED LIST DOCD IN RCRD: CPT | Performed by: NURSE PRACTITIONER

## 2025-05-08 PROCEDURE — 1036F TOBACCO NON-USER: CPT | Performed by: NURSE PRACTITIONER

## 2025-05-08 PROCEDURE — 74177 CT ABD & PELVIS W/CONTRAST: CPT

## 2025-05-08 PROCEDURE — 99285 EMERGENCY DEPT VISIT HI MDM: CPT

## 2025-05-08 PROCEDURE — 1124F ACP DISCUSS-NO DSCNMKR DOCD: CPT | Performed by: NURSE PRACTITIONER

## 2025-05-08 PROCEDURE — 1160F RVW MEDS BY RX/DR IN RCRD: CPT | Performed by: NURSE PRACTITIONER

## 2025-05-08 PROCEDURE — 3017F COLORECTAL CA SCREEN DOC REV: CPT | Performed by: NURSE PRACTITIONER

## 2025-05-08 PROCEDURE — 80053 COMPREHEN METABOLIC PANEL: CPT

## 2025-05-08 PROCEDURE — 70450 CT HEAD/BRAIN W/O DYE: CPT

## 2025-05-08 PROCEDURE — G8399 PT W/DXA RESULTS DOCUMENT: HCPCS | Performed by: NURSE PRACTITIONER

## 2025-05-08 PROCEDURE — 84484 ASSAY OF TROPONIN QUANT: CPT

## 2025-05-08 PROCEDURE — 96375 TX/PRO/DX INJ NEW DRUG ADDON: CPT

## 2025-05-08 PROCEDURE — 3080F DIAST BP >= 90 MM HG: CPT | Performed by: NURSE PRACTITIONER

## 2025-05-08 PROCEDURE — G8419 CALC BMI OUT NRM PARAM NOF/U: HCPCS | Performed by: NURSE PRACTITIONER

## 2025-05-08 PROCEDURE — 6360000004 HC RX CONTRAST MEDICATION: Performed by: RADIOLOGY

## 2025-05-08 PROCEDURE — 99215 OFFICE O/P EST HI 40 MIN: CPT | Performed by: NURSE PRACTITIONER

## 2025-05-08 PROCEDURE — 83605 ASSAY OF LACTIC ACID: CPT

## 2025-05-08 PROCEDURE — 96374 THER/PROPH/DIAG INJ IV PUSH: CPT

## 2025-05-08 PROCEDURE — 87804 INFLUENZA ASSAY W/OPTIC: CPT | Performed by: NURSE PRACTITIONER

## 2025-05-08 PROCEDURE — 1090F PRES/ABSN URINE INCON ASSESS: CPT | Performed by: NURSE PRACTITIONER

## 2025-05-08 RX ORDER — FLUCONAZOLE 150 MG/1
TABLET ORAL
Qty: 2 TABLET | Refills: 0 | Status: SHIPPED | OUTPATIENT
Start: 2025-05-08

## 2025-05-08 RX ORDER — AMOXICILLIN 875 MG/1
875 TABLET, COATED ORAL 2 TIMES DAILY
Qty: 20 TABLET | Refills: 0 | Status: CANCELLED | OUTPATIENT
Start: 2025-05-08 | End: 2025-05-18

## 2025-05-08 RX ORDER — 0.9 % SODIUM CHLORIDE 0.9 %
1000 INTRAVENOUS SOLUTION INTRAVENOUS ONCE
Status: COMPLETED | OUTPATIENT
Start: 2025-05-08 | End: 2025-05-08

## 2025-05-08 RX ORDER — PROCHLORPERAZINE EDISYLATE 5 MG/ML
10 INJECTION INTRAMUSCULAR; INTRAVENOUS ONCE
Status: COMPLETED | OUTPATIENT
Start: 2025-05-08 | End: 2025-05-08

## 2025-05-08 RX ORDER — DIPHENHYDRAMINE HYDROCHLORIDE 50 MG/ML
25 INJECTION, SOLUTION INTRAMUSCULAR; INTRAVENOUS ONCE
Status: COMPLETED | OUTPATIENT
Start: 2025-05-08 | End: 2025-05-08

## 2025-05-08 RX ORDER — PREDNISONE 10 MG/1
10 TABLET ORAL 2 TIMES DAILY
Qty: 10 TABLET | Refills: 0 | Status: SHIPPED | OUTPATIENT
Start: 2025-05-08 | End: 2025-05-13

## 2025-05-08 RX ORDER — MORPHINE SULFATE 8 MG/ML
6 INJECTION, SOLUTION INTRAMUSCULAR; INTRAVENOUS ONCE
Status: COMPLETED | OUTPATIENT
Start: 2025-05-08 | End: 2025-05-08

## 2025-05-08 RX ORDER — IOPAMIDOL 755 MG/ML
75 INJECTION, SOLUTION INTRAVASCULAR
Status: COMPLETED | OUTPATIENT
Start: 2025-05-08 | End: 2025-05-08

## 2025-05-08 RX ORDER — AMOXICILLIN 875 MG/1
875 TABLET, COATED ORAL 2 TIMES DAILY
Qty: 20 TABLET | Refills: 0 | Status: SHIPPED | OUTPATIENT
Start: 2025-05-08 | End: 2025-05-09

## 2025-05-08 RX ORDER — ONDANSETRON 2 MG/ML
4 INJECTION INTRAMUSCULAR; INTRAVENOUS ONCE
Status: COMPLETED | OUTPATIENT
Start: 2025-05-08 | End: 2025-05-08

## 2025-05-08 RX ADMIN — SODIUM CHLORIDE 1000 ML: 0.9 INJECTION, SOLUTION INTRAVENOUS at 17:43

## 2025-05-08 RX ADMIN — ONDANSETRON 4 MG: 2 INJECTION INTRAMUSCULAR; INTRAVENOUS at 19:19

## 2025-05-08 RX ADMIN — SODIUM CHLORIDE 1000 ML: 0.9 INJECTION, SOLUTION INTRAVENOUS at 20:10

## 2025-05-08 RX ADMIN — IOPAMIDOL 75 ML: 755 INJECTION, SOLUTION INTRAVENOUS at 20:59

## 2025-05-08 RX ADMIN — PROCHLORPERAZINE EDISYLATE 10 MG: 5 INJECTION INTRAMUSCULAR; INTRAVENOUS at 21:28

## 2025-05-08 RX ADMIN — MORPHINE SULFATE 6 MG: 8 INJECTION, SOLUTION INTRAMUSCULAR; INTRAVENOUS at 17:42

## 2025-05-08 RX ADMIN — DIPHENHYDRAMINE HYDROCHLORIDE 25 MG: 50 INJECTION INTRAMUSCULAR; INTRAVENOUS at 21:27

## 2025-05-08 ASSESSMENT — PAIN DESCRIPTION - LOCATION: LOCATION: HEAD

## 2025-05-08 ASSESSMENT — PAIN DESCRIPTION - DESCRIPTORS: DESCRIPTORS: ACHING;SHARP;SHOOTING

## 2025-05-08 ASSESSMENT — PAIN SCALES - GENERAL
PAINLEVEL_OUTOF10: 10
PAINLEVEL_OUTOF10: 10

## 2025-05-08 ASSESSMENT — PAIN - FUNCTIONAL ASSESSMENT
PAIN_FUNCTIONAL_ASSESSMENT: 0-10
PAIN_FUNCTIONAL_ASSESSMENT: 0-10

## 2025-05-08 ASSESSMENT — PAIN DESCRIPTION - PAIN TYPE: TYPE: ACUTE PAIN

## 2025-05-08 ASSESSMENT — PAIN DESCRIPTION - ORIENTATION: ORIENTATION: LEFT

## 2025-05-08 ASSESSMENT — PAIN DESCRIPTION - FREQUENCY: FREQUENCY: CONTINUOUS
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
## 2025-05-09 VITALS
OXYGEN SATURATION: 97 % | HEIGHT: 62 IN | WEIGHT: 142 LBS | DIASTOLIC BLOOD PRESSURE: 94 MMHG | SYSTOLIC BLOOD PRESSURE: 177 MMHG | BODY MASS INDEX: 26.13 KG/M2 | RESPIRATION RATE: 12 BRPM | TEMPERATURE: 98.4 F | HEART RATE: 98 BPM

## 2025-05-09 LAB
ANION GAP SERPL CALCULATED.3IONS-SCNC: 15 MMOL/L (ref 7–16)
ANION GAP SERPL CALCULATED.3IONS-SCNC: 19 MMOL/L (ref 7–16)
BUN SERPL-MCNC: 10 MG/DL (ref 6–23)
BUN SERPL-MCNC: 9 MG/DL (ref 6–23)
CALCIUM SERPL-MCNC: 8.7 MG/DL (ref 8.6–10.2)
CALCIUM SERPL-MCNC: 8.9 MG/DL (ref 8.6–10.2)
CHLORIDE SERPL-SCNC: 100 MMOL/L (ref 98–107)
CHLORIDE SERPL-SCNC: 96 MMOL/L (ref 98–107)
CO2 SERPL-SCNC: 20 MMOL/L (ref 22–29)
CO2 SERPL-SCNC: 21 MMOL/L (ref 22–29)
CREAT SERPL-MCNC: 0.4 MG/DL (ref 0.5–1)
CREAT SERPL-MCNC: 0.4 MG/DL (ref 0.5–1)
EKG ATRIAL RATE: 95 BPM
EKG P AXIS: 49 DEGREES
EKG P-R INTERVAL: 134 MS
EKG Q-T INTERVAL: 370 MS
EKG QRS DURATION: 90 MS
EKG QTC CALCULATION (BAZETT): 464 MS
EKG R AXIS: 11 DEGREES
EKG T AXIS: 39 DEGREES
EKG VENTRICULAR RATE: 95 BPM
GFR, ESTIMATED: >90 ML/MIN/1.73M2
GFR, ESTIMATED: >90 ML/MIN/1.73M2
GLUCOSE SERPL-MCNC: 101 MG/DL (ref 74–99)
GLUCOSE SERPL-MCNC: 98 MG/DL (ref 74–99)
POTASSIUM SERPL-SCNC: 3.4 MMOL/L (ref 3.5–5)
POTASSIUM SERPL-SCNC: 4.1 MMOL/L (ref 3.5–5)
SODIUM SERPL-SCNC: 135 MMOL/L (ref 132–146)
SODIUM SERPL-SCNC: 136 MMOL/L (ref 132–146)

## 2025-05-09 PROCEDURE — 2580000003 HC RX 258

## 2025-05-09 PROCEDURE — 80048 BASIC METABOLIC PNL TOTAL CA: CPT

## 2025-05-09 PROCEDURE — 93010 ELECTROCARDIOGRAM REPORT: CPT | Performed by: INTERNAL MEDICINE

## 2025-05-09 PROCEDURE — 6370000000 HC RX 637 (ALT 250 FOR IP): Performed by: STUDENT IN AN ORGANIZED HEALTH CARE EDUCATION/TRAINING PROGRAM

## 2025-05-09 RX ORDER — ONDANSETRON 4 MG/1
4 TABLET, FILM COATED ORAL 3 TIMES DAILY PRN
Qty: 15 TABLET | Refills: 0 | Status: SHIPPED | OUTPATIENT
Start: 2025-05-09

## 2025-05-09 RX ORDER — 0.9 % SODIUM CHLORIDE 0.9 %
1000 INTRAVENOUS SOLUTION INTRAVENOUS ONCE
Status: COMPLETED | OUTPATIENT
Start: 2025-05-09 | End: 2025-05-09

## 2025-05-09 RX ORDER — POTASSIUM CHLORIDE 1500 MG/1
40 TABLET, EXTENDED RELEASE ORAL ONCE
Status: COMPLETED | OUTPATIENT
Start: 2025-05-09 | End: 2025-05-09

## 2025-05-09 RX ADMIN — SODIUM CHLORIDE 1000 ML: 0.9 INJECTION, SOLUTION INTRAVENOUS at 02:17

## 2025-05-09 RX ADMIN — POTASSIUM CHLORIDE 40 MEQ: 1500 TABLET, EXTENDED RELEASE ORAL at 08:04

## 2025-05-09 NOTE — ED NOTES
Rn ensured patient had somewhere to go upon discharge. Per patient she is able to drive herself home but was unable last night because she can't see to drive in the dark. At this time patient is able to drive self home.

## 2025-05-09 NOTE — DISCHARGE INSTRUCTIONS
Please call and follow-up with your family doctor soon as possible.  Take antibiotics for full course as discussed.  Use Zofran as needed for nausea and vomiting.  Drink plenty of fluids and continue to take your medications at home as discussed.

## 2025-05-09 NOTE — ED PROVIDER NOTES
[facility] EMERGENCY DEPARTMENT  EMERGENCY DEPARTMENT ENCOUNTER        Pt Name: Melly Ga  MRN: [id number]  Birthdate 1955  Date of evaluation: 5/8/2025  Provider: Jose Antonio Luu DO  PCP: Richie Lozoya MD  Note Started: 1:01 AM EDT 5/9/25    CHIEF COMPLAINT       Chief Complaint   Patient presents with    Ear Drainage     bloody    Dizziness    Hypertension     196/116       HISTORY OF PRESENT ILLNESS: 1 or more Elements   History received from: Patient    Melly Ga is a 69 y.o. female who presents to the emergency department with chief complaint of left-sided facial pain.  Patient states that the pain has been gradually worsening since Monday this week.  States it is a sharp pain in the left ear and that she has history of for trigeminal neuralgia and this pain is significant and similar to her trigeminal neuralgia on the left side of her face.  States that she has been nauseous over the last couple of days after the sharp ear pain started and so she has not been able to take her medication for her trigeminal neuralgia and thus her trigeminal neuralgia has worsened.  Does state that she has had emesis with this nausea that is nonbloody and nonbilious.  States that she is not having any difficulty hearing out of her left ear but has noted some small bloody drainage out of the left ear.  Does also mention that she has a little bit of what she describes as dizziness which on further evaluation is room spinning sensation that comes along with the pain in her left ear.  Denies any other symptoms including fevers, chills, chest pain, shortness of breath, abdominal pain, hematuria or dysuria, constipation or diarrhea.  No lightheadedness or dizziness.  No numbness or weakness of any extremities.  Has not had any falls or injuries and does not take any blood thinners.    Nursing Notes were all reviewed and agreed with or any disagreements were addressed in the HPI.    REVIEW OF  Radiologist below, if available at the time of this note:    CT ABDOMEN PELVIS W IV CONTRAST Additional Contrast? None   Final Result   1. No acute intra-abdominal or pelvic process.   2. Hiatal hernia.   3. Severe degenerative changes of the lumbar spine.         CT HEAD WO CONTRAST   Final Result   No acute intracranial abnormality.             PROCEDURES   Unless otherwise noted below, none      Medical Decision Making/Differential Diagnosis:   CC/HPI Summary, DDx, ED Course, Reassessment, Tests Considered, Patient expectation:   Differential diagnosis includes but is not limited to: Intracranial hemorrhage, CVA, TIA, vertigo, ruptured tympanic membrane, otitis media, uncontrolled trigeminal neuralgia, gastroenteritis, dehydration    69 y.o. female who presents to the emergency department with chief complaint of ear pain.  On arrival in emergency department patient is complaining of nausea and pain and is given Zofran and morphine as well as 1 L of normal saline.  Vitals do improve after these maneuvers and she is subsequently given additional liter of normal saline with metabolic panel with slight elevated anion gap but no other significant electrolyte derangements, kidney injury, or LFT changes.  Patient does subsequently have some worsening nausea and is given Compazine and Benadryl and additional liter of normal saline.  Examination revealed ruptured tympanic membrane, likely cause of patient's left ear pain with serous drainage.  CT head was performed which does not demonstrate any intracranial abscess or extension and there is no intracranial hemorrhage or mass lesion.  Urinalysis negative for acute findings including cystitis and lactic acid and lipase are normal.  Did perform repeat metabolic panel with improved anion gap and only slight hypokalemia and patient is given replacements at this time.  Blood counts normal without leukocytosis or anemia.  EKG was reassuring with stable troponins.  Due to

## 2025-05-10 LAB — NONINV COLON CA DNA+OCC BLD SCRN STL QL: NORMAL

## 2025-05-12 ENCOUNTER — RESULTS FOLLOW-UP (OUTPATIENT)
Dept: PRIMARY CARE CLINIC | Age: 70
End: 2025-05-12

## 2025-05-15 ENCOUNTER — TELEPHONE (OUTPATIENT)
Dept: PRIMARY CARE CLINIC | Age: 70
End: 2025-05-15

## 2025-05-15 DIAGNOSIS — H72.92 RUPTURE OF LEFT TYMPANIC MEMBRANE: Primary | ICD-10-CM

## 2025-05-15 NOTE — TELEPHONE ENCOUNTER
Referral is done however Dr. Whitney's office closed at 4:00 pm.      Will follow up with phone call in morning.      Electronically signed by Sharifa Crews LPN on 5/15/2025 at 4:05 PM

## 2025-05-15 NOTE — TELEPHONE ENCOUNTER
Per ED it appears she had left TM rupture. Urgent referral to Dr. Whitney, only ENT in [place]. He is in [place] office Fridays. We should be able to call to see if they can get her in tomorrow.

## 2025-05-15 NOTE — TELEPHONE ENCOUNTER
Pt called in crying and stating she can not hear anything and asking for a referral to a hearing doctor that is in [place] [facility].  She had a fax number but could not tell me name of doctor.  She states she can not hear anything and she is so scared she is losing her hearing and she was at the ED twice for it but they did nothing and will not give her a referral to get help.  She stated she can not go another day without being able to hear Please help her and was more upset because you were not in our office today.      I asked her if she was talking about Dr Whitney and she stated I do not know what doctor it is I just know I need them to see why I can't hear.      Please Advise.      Stated fax number is [phone]

## 2025-05-16 ENCOUNTER — TELEPHONE (OUTPATIENT)
Dept: PRIMARY CARE CLINIC | Age: 70
End: 2025-05-16

## 2025-05-16 NOTE — TELEPHONE ENCOUNTER
Last Appointment:  7/8/2024  Future Appointments   Date Time Provider Department Center   8/4/2025 12:30 PM Richie Lozoya MD [facility] DEP      Patient has been admitted to [facility] and will be transferred to [facility].  Patient reports her ears are infected and blood pressure was very high.  She needs to see a cardiologist and neurologist that's why they are transferring her.    Also, Dr. Whitney is scheduling out to July.  He is also part time / retired and not wanting new patients.    The other ENT's in [facility] are scheduling out to July as well.    Electronically signed by Sharifa Crews LPN on 5/16/2025 at 8:26 AM

## 2025-05-21 ENCOUNTER — TELEPHONE (OUTPATIENT)
Dept: PRIMARY CARE CLINIC | Age: 70
End: 2025-05-21

## 2025-05-22 NOTE — TELEPHONE ENCOUNTER
Pt can not come tomorrow would like to come next week.  Where do you feel we can squeeze her in?

## 2025-05-22 NOTE — TELEPHONE ENCOUNTER
Care Transitions Initial Follow Up Call    Outreach made within 2 business days of discharge: Yes    Patient: Melly Ga Patient : 1955   MRN: [id number]  Reason for Admission: Lucas Antonio   Discharge Date: 25       Spoke with: myra    Discharge department/facility: [facility] Interactive Patient Contact:  Was patient able to fill all prescriptions: Yes  Was patient instructed to bring all medications to the follow-up visit: Yes  Is patient taking all medications as directed in the discharge summary? Yes  Does patient understand their discharge instructions: Yes  Does patient have questions or concerns that need addressed prior to 7-14 day follow up office visit: no   - pt will not return call    Additional needs identified to be addressed with provider  Pt can not come tomorrow would like to come next week.               Scheduled appointment with PCP within 7-14 days    Follow Up  Future Appointments   Date Time Provider Department Center   2025 12:30 PM Richie Loz[facility] MD [facility]       Carol Coles

## 2025-05-23 NOTE — TELEPHONE ENCOUNTER
Called pt and her husbands phone.  Left msg on husbands voicemail since pt's was full to call back to schedule an appointment.  Told them looking at June 2nd at 2 pm.

## 2025-05-30 ENCOUNTER — TELEPHONE (OUTPATIENT)
Dept: PRIMARY CARE CLINIC | Age: 70
End: 2025-05-30

## 2025-05-30 NOTE — TELEPHONE ENCOUNTER
Care Transitions Initial Follow Up Call    Outreach made within 2 business days of discharge: no    Patient: Melly Ga Patient : 1955   MRN: [id number]  Reason for Admission: axiety and dizziness  Discharge Date: 25       Spoke with: Melly Ga    Discharge department/facility: [facility] Interactive Patient Contact:  Was patient able to fill all prescriptions: Yes  Was patient instructed to bring all medications to the follow-up visit: Yes  Is patient taking all medications as directed in the discharge summary? No  Does patient understand their discharge instructions: No: patient doesn't remember anything  Does patient have questions or concerns that need addressed prior to 7-14 day follow up office visit: no    Additional needs identified to be addressed with provider  Patient does not remember much of anything             Scheduled appointment with PCP within 7-14 days    Follow Up  Future Appointments   Date Time Provider Department Center   2025  8:00 AM Richie Lozoya MD [facility] DEP   2025 12:30 PM Richie Lozoya MD [facility]       [place]

## 2025-06-02 ENCOUNTER — OFFICE VISIT (OUTPATIENT)
Dept: PRIMARY CARE CLINIC | Age: 70
End: 2025-06-02

## 2025-06-02 VITALS
OXYGEN SATURATION: 95 % | WEIGHT: 145.2 LBS | HEIGHT: 62 IN | HEART RATE: 80 BPM | SYSTOLIC BLOOD PRESSURE: 134 MMHG | DIASTOLIC BLOOD PRESSURE: 62 MMHG | TEMPERATURE: 98.1 F | BODY MASS INDEX: 26.72 KG/M2

## 2025-06-02 DIAGNOSIS — D64.9 NORMOCYTIC ANEMIA: ICD-10-CM

## 2025-06-02 DIAGNOSIS — H72.92 RUPTURE OF LEFT TYMPANIC MEMBRANE: ICD-10-CM

## 2025-06-02 DIAGNOSIS — R26.0 ATAXIC GAIT: ICD-10-CM

## 2025-06-02 DIAGNOSIS — G43.709 CHRONIC MIGRAINE WITHOUT AURA WITHOUT STATUS MIGRAINOSUS, NOT INTRACTABLE: ICD-10-CM

## 2025-06-02 DIAGNOSIS — Z09 HOSPITAL DISCHARGE FOLLOW-UP: Primary | ICD-10-CM

## 2025-06-02 DIAGNOSIS — R42 CHRONIC VERTIGO: ICD-10-CM

## 2025-06-02 DIAGNOSIS — E87.6 HYPOKALEMIA: ICD-10-CM

## 2025-06-02 DIAGNOSIS — G50.0 ANESTHESIA DOLOROSA IN DISTRIBUTION OF TRIGEMINAL NERVE: ICD-10-CM

## 2025-06-02 DIAGNOSIS — G50.0 TRIGEMINAL NEURALGIA: ICD-10-CM

## 2025-06-02 DIAGNOSIS — R56.9 SEIZURE (HCC): ICD-10-CM

## 2025-06-02 DIAGNOSIS — H55.09 ROTARY NYSTAGMUS: ICD-10-CM

## 2025-06-02 DIAGNOSIS — E87.1 HYPONATREMIA: ICD-10-CM

## 2025-06-02 DIAGNOSIS — E83.42 HYPOMAGNESEMIA: ICD-10-CM

## 2025-06-02 LAB
ANION GAP SERPL CALCULATED.3IONS-SCNC: 14 MMOL/L (ref 7–16)
BUN BLDV-MCNC: 16 MG/DL (ref 6–23)
CALCIUM SERPL-MCNC: 9.4 MG/DL (ref 8.6–10.2)
CHLORIDE BLD-SCNC: 99 MMOL/L (ref 98–107)
CO2: 19 MMOL/L (ref 22–29)
CREAT SERPL-MCNC: 0.5 MG/DL (ref 0.5–1)
GFR, ESTIMATED: >90 ML/MIN/1.73M2
GLUCOSE BLD-MCNC: 94 MG/DL (ref 74–99)
HCT VFR BLD CALC: 34.3 % (ref 34–48)
HEMOGLOBIN: 11.3 G/DL (ref 11.5–15.5)
MAGNESIUM: 2 MG/DL (ref 1.6–2.6)
MCH RBC QN AUTO: 30.6 PG (ref 26–35)
MCHC RBC AUTO-ENTMCNC: 32.9 G/DL (ref 32–34.5)
MCV RBC AUTO: 93 FL (ref 80–99.9)
PDW BLD-RTO: 12.9 % (ref 11.5–15)
PLATELET # BLD: 268 K/UL (ref 130–450)
PMV BLD AUTO: 9.9 FL (ref 7–12)
POTASSIUM SERPL-SCNC: 5.2 MMOL/L (ref 3.5–5)
RBC # BLD: 3.69 M/UL (ref 3.5–5.5)
SODIUM BLD-SCNC: 132 MMOL/L (ref 132–146)
WBC # BLD: 5.8 K/UL (ref 4.5–11.5)

## 2025-06-02 RX ORDER — BACLOFEN 5 MG/1
5 TABLET ORAL 3 TIMES DAILY
Qty: 270 TABLET | Refills: 0 | Status: SHIPPED | OUTPATIENT
Start: 2025-06-02

## 2025-06-02 RX ORDER — FLUTICASONE PROPIONATE 50 MCG
1 SPRAY, SUSPENSION (ML) NASAL 2 TIMES DAILY
COMMUNITY

## 2025-06-02 RX ORDER — OXCARBAZEPINE 600 MG/1
600 TABLET, FILM COATED ORAL 2 TIMES DAILY
Qty: 180 TABLET | Refills: 0 | Status: SHIPPED | OUTPATIENT
Start: 2025-06-02

## 2025-06-02 RX ORDER — IBUPROFEN 600 MG/1
600 TABLET, FILM COATED ORAL EVERY 6 HOURS PRN
COMMUNITY
End: 2025-06-02 | Stop reason: ALTCHOICE

## 2025-06-02 RX ORDER — OXCARBAZEPINE 300 MG/1
300 TABLET, FILM COATED ORAL
Qty: 90 TABLET | Refills: 0 | Status: SHIPPED | OUTPATIENT
Start: 2025-06-02

## 2025-06-02 SDOH — ECONOMIC STABILITY: FOOD INSECURITY: WITHIN THE PAST 12 MONTHS, THE FOOD YOU BOUGHT JUST DIDN'T LAST AND YOU DIDN'T HAVE MONEY TO GET MORE.: NEVER TRUE

## 2025-06-02 SDOH — ECONOMIC STABILITY: FOOD INSECURITY: WITHIN THE PAST 12 MONTHS, YOU WORRIED THAT YOUR FOOD WOULD RUN OUT BEFORE YOU GOT MONEY TO BUY MORE.: NEVER TRUE

## 2025-06-02 NOTE — PROGRESS NOTES
Post-Discharge Transitional Care Management Progress Note      Melly Ga   YOB: 1955    Date of Office Visit:  6/2/2025  Date of Hospital Admission: 5/16/2025  Date of Hospital Discharge: 5/22/2025    Care management risk score Rising risk (score 2-5) and Complex Care (Scores >=6): No Risk Score On File     Non face to face  following discharge, date last encounter closed (first attempt may have been earlier): 05/21/2025 05/21/2025    Call initiated 2 business days of discharge: Yes    ASSESSMENT/PLAN:   Hospital discharge follow-up  -     CO DISCHARGE MEDS RECONCILED W/ CURRENT OUTPATIENT MED LIST  Ataxic gait  -     External Referral To Neurology  Chronic vertigo  -     External Referral To Neurology  Rotary nystagmus  -     External Referral To Neurology  Rupture of left tympanic membrane  Chronic migraine without aura without status migrainosus, not intractable  -     External Referral To Neurology  Trigeminal neuralgia  -     External Referral To Neurology  -     Baclofen (LIORESAL) 5 MG tablet; Take 1 tablet by mouth 3 times daily, Disp-270 tablet, R-0Normal  -     OXcarbazepine (TRILEPTAL) 600 MG tablet; Take 1 tablet by mouth in the morning and at bedtime, Disp-180 tablet, R-0Normal  -     OXcarbazepine (TRILEPTAL) 300 MG tablet; Take 1 tablet by mouth Daily with lunch, Disp-90 tablet, R-0Normal  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

## 2025-06-02 NOTE — PATIENT INSTRUCTIONS
Ask Ear Nose Throat Dr. Peñaloza about vestibular therapy at your appointment on June 9  See your [place] discharge summary for his information    Call [facility] [phone]    [facility] should call you  Address: [address] [place] [id number]  Phone: [phone]    Use up what you have of ibuprofen as needed with food then switch to meloxicam as needed with food. You cannot take both for pain, only one.  Lower baclofen 5 mg 3 times daily as needed  Lower afternoon/mid day Trileptal to 300 mg. Continue 600 mg twice daily.

## 2025-06-03 ENCOUNTER — RESULTS FOLLOW-UP (OUTPATIENT)
Dept: PRIMARY CARE CLINIC | Age: 70
End: 2025-06-03

## 2025-06-03 ENCOUNTER — TELEPHONE (OUTPATIENT)
Dept: PRIMARY CARE CLINIC | Age: 70
End: 2025-06-03

## 2025-06-03 NOTE — TELEPHONE ENCOUNTER
Last Appointment:  6/2/2025  Future Appointments   Date Time Provider Department Center   8/4/2025 12:30 PM Richie Lozoya MD [facility] DEP      Received message for Dr. Brown's office they will not take referral.  Patient was discharged from their office in 12/2023 for non compliance and missed appointments.    Will need new referral.    Electronically signed by Sharifa Crews LPN on 6/3/2025 at 11:14 AM

## 2025-06-03 NOTE — TELEPHONE ENCOUNTER
Please let patient know they will not see her. [facility] or I can place referral to [place] but there are currently long wait times.

## 2025-06-05 NOTE — TELEPHONE ENCOUNTER
Pt stated since there is such a long wait she guesses she will stay with the doctor she can't understand.

## 2025-06-06 NOTE — TELEPHONE ENCOUNTER
[facility] if she would like, while she sees [facility] in the meantime?

## 2025-06-13 ENCOUNTER — TELEPHONE (OUTPATIENT)
Dept: PRIMARY CARE CLINIC | Age: 70
End: 2025-06-13

## 2025-06-13 NOTE — TELEPHONE ENCOUNTER
Patient is calling in about the change in her medication. She said that today makes day 4 being on the changed prescription and she does not want to go to the hospital. She said she went to  the Trileptal. She used to take 600 mg and now she takes 300 mg daily. She needs to go back to the 600 mg tid. She said this lower dose is making her very sick and she can't get out of bed. She was not aware that the medication was going to change. SHE NEEDS IT TO GO BACK TO THE ORIGINAL DOSE. SHE IS BACK TO USING THE WALKER BECAUSE SHE IS SO DIZZY, SHE IS BACK TO USING THE BEDSIDE TOILET AND SHE DOESN'T WANT TO END UP IN THE EMERGENCY ROOM       PATIENT WOULD LIKE A CALL BACK WHEN THE NEW RX IS SENT

## 2025-07-03 DIAGNOSIS — K58.1 IRRITABLE BOWEL SYNDROME WITH CONSTIPATION: ICD-10-CM

## 2025-07-03 DIAGNOSIS — I10 PRIMARY HYPERTENSION: ICD-10-CM

## 2025-07-03 DIAGNOSIS — R42 VERTIGO: ICD-10-CM

## 2025-07-03 RX ORDER — MECLIZINE HCL 12.5 MG 12.5 MG/1
12.5 TABLET ORAL 3 TIMES DAILY PRN
Qty: 90 TABLET | Refills: 0 | Status: SHIPPED | OUTPATIENT
Start: 2025-07-03

## 2025-07-03 RX ORDER — ONDANSETRON 4 MG/1
4 TABLET, FILM COATED ORAL 3 TIMES DAILY PRN
Qty: 15 TABLET | Refills: 0 | Status: SHIPPED | OUTPATIENT
Start: 2025-07-03

## 2025-07-03 RX ORDER — LISINOPRIL 20 MG/1
20 TABLET ORAL DAILY
Qty: 90 TABLET | Refills: 0 | Status: SHIPPED | OUTPATIENT
Start: 2025-07-03

## 2025-07-03 RX ORDER — POLYETHYLENE GLYCOL 3350 17 G/17G
17 POWDER, FOR SOLUTION ORAL DAILY
Qty: 1530 G | Refills: 1 | Status: SHIPPED | OUTPATIENT
Start: 2025-07-03

## 2025-07-03 RX ORDER — DICYCLOMINE HCL 20 MG
20 TABLET ORAL 4 TIMES DAILY PRN
Qty: 120 TABLET | Refills: 0 | Status: SHIPPED | OUTPATIENT
Start: 2025-07-03

## 2025-07-03 NOTE — TELEPHONE ENCOUNTER
Name of Medication(s) Requested:  Requested Prescriptions     Pending Prescriptions Disp Refills    lisinopril (PRINIVIL;ZESTRIL) 20 MG tablet 90 tablet 1     Sig: Take 1 tablet by mouth daily    ondansetron (ZOFRAN) 4 MG tablet 15 tablet 0     Sig: Take 1 tablet by mouth 3 times daily as needed for Nausea or Vomiting    meclizine (ANTIVERT) 12.5 MG tablet 90 tablet 0     Sig: Take 1 tablet by mouth 3 times daily as needed for Dizziness    dicyclomine (BENTYL) 20 MG tablet 120 tablet 1     Sig: Take 1 tablet by mouth 4 times daily as needed (ABD DISCOMFORT)       Medication is on current medication list Yes    Dosage and directions were verified? Yes    Quantity verified: 30 day supply     Pharmacy Verified?  Yes    Last Appointment:  6/2/2025    Future appts:  Future Appointments   Date Time Provider Department Center   8/4/2025 12:30 PM Richie Lozoya MD [facility] DEP        (If no appt send self scheduling link. .REFILLAPPT)  Scheduling request sent?     [] Yes  [x] No    Does patient need updated?  [] Yes  [x] No

## 2025-07-03 NOTE — TELEPHONE ENCOUNTER
Pt called in asking for a refill on the polyethylene glycol as well.  Name of Medication(s) Requested:  Requested Prescriptions     Pending Prescriptions Disp Refills    lisinopril (PRINIVIL;ZESTRIL) 20 MG tablet 90 tablet 1     Sig: Take 1 tablet by mouth daily    ondansetron (ZOFRAN) 4 MG tablet 15 tablet 0     Sig: Take 1 tablet by mouth 3 times daily as needed for Nausea or Vomiting    meclizine (ANTIVERT) 12.5 MG tablet 90 tablet 0     Sig: Take 1 tablet by mouth 3 times daily as needed for Dizziness    dicyclomine (BENTYL) 20 MG tablet 120 tablet 1     Sig: Take 1 tablet by mouth 4 times daily as needed (ABD DISCOMFORT)    polyethylene glycol (GLYCOLAX) 17 GM/SCOOP powder 1530 g 1     Sig: Take 17 g by mouth daily       Medication is on current medication list Yes    Dosage and directions were verified? Yes    Quantity verified: 30 day supply     Pharmacy Verified?  Yes    Last Appointment:  6/2/2025    Future appts:  Future Appointments   Date Time Provider Department Center   8/4/2025 12:30 PM Richie Lozoya MD [facility] DEP        (If no appt send self scheduling link. .REFILLAPPT)  Scheduling request sent?     [] Yes  [x] No    Does patient need updated?  [] Yes  [x] No

## 2025-07-08 ENCOUNTER — TELEPHONE (OUTPATIENT)
Dept: PRIMARY CARE CLINIC | Age: 70
End: 2025-07-08

## 2025-07-08 NOTE — TELEPHONE ENCOUNTER
Pt called in for a refill of her lisinopril. I advised her it was sent on July 3rd. She said she was there today and they did not give this medication to her. I called the pharmacy and they gave her 90 pills on 5/12. I called the patient back and she said she has been out for 5 days and she will just go without. I advised her to call the insurance to see what the cash price is for this med and her response was she has no cash and will go without. Just fyi

## 2025-07-10 DIAGNOSIS — I10 PRIMARY HYPERTENSION: ICD-10-CM

## 2025-07-10 RX ORDER — AMLODIPINE BESYLATE 10 MG/1
10 TABLET ORAL DAILY
Qty: 90 TABLET | Refills: 0 | Status: SHIPPED | OUTPATIENT
Start: 2025-07-10

## 2025-07-10 NOTE — TELEPHONE ENCOUNTER
Name of Medication(s) Requested:  Requested Prescriptions     Pending Prescriptions Disp Refills    amLODIPine (NORVASC) 10 MG tablet [Pharmacy Med Name: AMLODIPINE BESYLATE 10MGTABLETS] 90 tablet 1     Sig: TAKE 1 TABLET BY MOUTH DAILY       Medication is on current medication list Yes    Dosage and directions were verified? Yes    Quantity verified: 90 day supply     Pharmacy Verified?  Yes    Last Appointment:  Visit date not found    Future appts:  Future Appointments   Date Time Provider Department Center   8/4/2025 12:30 PM Richie Lozoya MD [facility] DEP        (If no appt send self scheduling link. .REFILLAPPT)  Scheduling request sent?     [] Yes  [x] No    Does patient need updated?  [] Yes  [x] No

## (undated) DEVICE — STRIP,CLOSURE,WOUND,MEDI-STRIP,1/2X4: Brand: MEDLINE

## (undated) DEVICE — TOWEL,OR,DSP,ST,BLUE,STD,6/PK,12PK/CS: Brand: MEDLINE

## (undated) DEVICE — GLOVE SURG SZ 8 L12IN FNGR THK79MIL GRN LTX FREE

## (undated) DEVICE — DRAPE,REIN 53X77,STERILE: Brand: MEDLINE

## (undated) DEVICE — KIT SURG W7XL11IN 2 PKT UNTREATED NA

## (undated) DEVICE — SUTURE STRATAFIX SYMMETRIC SZ 1 L18IN ABSRB VLT CT1 L36CM SXPP1A404

## (undated) DEVICE — GLOVE ORTHO 8   MSG9480

## (undated) DEVICE — HANDPIECE SET WITH COAXIAL HIGH FLOW TIP AND SUCTION TUBE: Brand: INTERPULSE

## (undated) DEVICE — TOTAL HIP PK

## (undated) DEVICE — HEWSON SUTURE RETRIEVER: Brand: HEWSON SUTURE RETRIEVER

## (undated) DEVICE — APPLICATOR MEDICATED 26 CC SOLUTION HI LT ORNG CHLORAPREP

## (undated) DEVICE — TUBING, SUCTION, 9/32" X 10', STRAIGHT: Brand: MEDLINE

## (undated) DEVICE — 3M™ IOBAN™ 2 ANTIMICROBIAL INCISE DRAPE 6651EZ: Brand: IOBAN™ 2

## (undated) DEVICE — GOWN,SIRUS,FABRNF,XL,20/CS: Brand: MEDLINE

## (undated) DEVICE — SOLUTION IRRIG 3000ML 0.9% SOD CHL USP UROMATIC PLAS CONT

## (undated) DEVICE — PACK PROCEDURE SURG GEN CUST

## (undated) DEVICE — GOWN,SIRUS,POLYRNF,BRTHSLV,XLN/XL,20/CS: Brand: MEDLINE

## (undated) DEVICE — SPONGE LAP W18XL18IN WHT COT 4 PLY FLD STRUNG RADPQ DISP ST 2 PER PACK

## (undated) DEVICE — ELECTRODE PT RET AD L9FT HI MOIST COND ADH HYDRGEL CORDED

## (undated) DEVICE — 3M™ STERI-DRAPE™ U-DRAPE 1015: Brand: STERI-DRAPE™

## (undated) DEVICE — BIT DRL L5IN DIA2.4MM STD ST S STL TWST BUSA

## (undated) DEVICE — SYRINGE MED 30ML STD CLR PLAS LUERLOCK TIP N CTRL DISP

## (undated) DEVICE — LIQUIBAND RAPID ADHESIVE 36/CS 0.8ML: Brand: MEDLINE

## (undated) DEVICE — DRAPE,TOP,102X53,STERILE: Brand: MEDLINE

## (undated) DEVICE — PILLOW POS W15XH6XL22IN RASPBERRY FOAM ABD W/ STRP DISP FOR

## (undated) DEVICE — NEEDLE HYPO 18GA L1.5IN PNK POLYPR HUB S STL REG BVL STR

## (undated) DEVICE — DOUBLE BASIN SET: Brand: MEDLINE INDUSTRIES, INC.

## (undated) DEVICE — 1000 S-DRAPE TOWEL DRAPE 10/BX: Brand: STERI-DRAPE™

## (undated) DEVICE — GLOVE SURG SZ 8 CRM LTX FREE POLYISOPRENE POLYMER BEAD ANTI

## (undated) DEVICE — GLOVE ORANGE PI 8   MSG9080

## (undated) DEVICE — DRESSING HYDROFIBER AQUACEL AG ADVANTAGE 3.5X10 IN

## (undated) DEVICE — 2108 SERIES SAGITTAL BLADE, OFFSET (20.0 X 0.89 X 80.0MM)

## (undated) DEVICE — BLADE ES L6IN ELASTOMERIC COAT EXT DURABLE BEND UPTO 90DEG

## (undated) DEVICE — 4-PORT MANIFOLD: Brand: NEPTUNE 2